# Patient Record
Sex: FEMALE | Race: BLACK OR AFRICAN AMERICAN | NOT HISPANIC OR LATINO | URBAN - METROPOLITAN AREA
[De-identification: names, ages, dates, MRNs, and addresses within clinical notes are randomized per-mention and may not be internally consistent; named-entity substitution may affect disease eponyms.]

---

## 2018-06-12 ENCOUNTER — EMERGENCY (EMERGENCY)
Facility: HOSPITAL | Age: 33
LOS: 0 days | Discharge: HOME | End: 2018-06-12
Attending: EMERGENCY MEDICINE | Admitting: EMERGENCY MEDICINE

## 2018-06-12 VITALS
SYSTOLIC BLOOD PRESSURE: 137 MMHG | OXYGEN SATURATION: 100 % | HEART RATE: 122 BPM | DIASTOLIC BLOOD PRESSURE: 75 MMHG | RESPIRATION RATE: 16 BRPM

## 2018-06-12 VITALS — DIASTOLIC BLOOD PRESSURE: 85 MMHG | HEART RATE: 107 BPM | SYSTOLIC BLOOD PRESSURE: 139 MMHG

## 2018-06-12 DIAGNOSIS — T36.8X5A ADVERSE EFFECT OF OTHER SYSTEMIC ANTIBIOTICS, INITIAL ENCOUNTER: ICD-10-CM

## 2018-06-12 DIAGNOSIS — R21 RASH AND OTHER NONSPECIFIC SKIN ERUPTION: ICD-10-CM

## 2018-06-12 DIAGNOSIS — Z79.899 OTHER LONG TERM (CURRENT) DRUG THERAPY: ICD-10-CM

## 2018-06-12 DIAGNOSIS — Z88.8 ALLERGY STATUS TO OTHER DRUGS, MEDICAMENTS AND BIOLOGICAL SUBSTANCES: ICD-10-CM

## 2018-06-12 DIAGNOSIS — L29.9 PRURITUS, UNSPECIFIED: ICD-10-CM

## 2018-06-12 RX ORDER — DIPHENHYDRAMINE HCL 50 MG
50 CAPSULE ORAL ONCE
Qty: 0 | Refills: 0 | Status: COMPLETED | OUTPATIENT
Start: 2018-06-12 | End: 2018-06-12

## 2018-06-12 RX ORDER — FAMOTIDINE 10 MG/ML
20 INJECTION INTRAVENOUS DAILY
Qty: 0 | Refills: 0 | Status: DISCONTINUED | OUTPATIENT
Start: 2018-06-12 | End: 2018-06-12

## 2018-06-12 RX ORDER — SODIUM CHLORIDE 9 MG/ML
1000 INJECTION INTRAMUSCULAR; INTRAVENOUS; SUBCUTANEOUS ONCE
Qty: 0 | Refills: 0 | Status: COMPLETED | OUTPATIENT
Start: 2018-06-12 | End: 2018-06-12

## 2018-06-12 RX ORDER — FAMOTIDINE 10 MG/ML
1 INJECTION INTRAVENOUS
Qty: 10 | Refills: 0
Start: 2018-06-12 | End: 2018-06-16

## 2018-06-12 RX ORDER — DIPHENHYDRAMINE HCL 50 MG
1 CAPSULE ORAL
Qty: 30 | Refills: 0
Start: 2018-06-12 | End: 2018-06-16

## 2018-06-12 RX ADMIN — Medication 50 MILLIGRAM(S): at 17:39

## 2018-06-12 RX ADMIN — SODIUM CHLORIDE 2000 MILLILITER(S): 9 INJECTION INTRAMUSCULAR; INTRAVENOUS; SUBCUTANEOUS at 17:39

## 2018-06-12 RX ADMIN — FAMOTIDINE 100 MILLIGRAM(S): 10 INJECTION INTRAVENOUS at 17:39

## 2018-06-12 RX ADMIN — Medication 125 MILLIGRAM(S): at 17:39

## 2018-06-12 NOTE — ED PROVIDER NOTE - OBJECTIVE STATEMENT
32 y/o female with multiple allergies presents to ED with complaints of rash/hives s/p administration of Bactrim for sinusitis and Celecoxib for chronic pain.  Has taken both meds x 2 days.  No CP/SOB.  No oropharyngeal complaints.  Speaking in full, unlabored sentences.  Has taken PO Benadryl with minimal relief.

## 2018-06-12 NOTE — ED PROVIDER NOTE - PHYSICAL EXAMINATION
Vital Signs: I have reviewed the initial vital signs.   Constitutional: WDWN in NAD.   Integumentary: (+) diffuse urticarial blanching rash to b/l upper extremities, trunk and face.   ENT: MMM, good turgor.  No tongue swelling.  No drooling.    NECK: Supple, non-tender, no JVD.   Cardiovascular: RRR, No JVD.   Respiratory: CTA b/l, no wheezing  Gastrointestinal: soft, non tender.    Musculoskeletal: FROM, no edema, no calf pain/swelling/erythema.   Neurologic: AAOx3, No focal deficits.

## 2018-06-12 NOTE — ED ADULT TRIAGE NOTE - CHIEF COMPLAINT QUOTE
recent change in antibiotics from augmetin to bactrim hives increased heart rate took benadryl 0900 with no relief

## 2018-06-12 NOTE — ED ADULT NURSE NOTE - OBJECTIVE STATEMENT
patient with hives and itching since 9am this morning. recently started taking new medication a few days ago

## 2018-06-12 NOTE — ED PROVIDER NOTE - NS ED ROS FT
Constitutional: (-) fever  (-)chills  (-)sweats  Eyes/ENT: (-) blurry vision, (-) epistaxis  (-)rhinorrhea   (-) sore throat    Cardiovascular: (-) chest pain, (-) palpitations (-) edema   Respiratory: (-) cough, (-) shortness of breath   Gastrointestinal: (-)nausea  (-)vomiting, (-) diarrhea  (-) abdominal pain   Musculoskeletal: (-) neck pain, (-) back pain, (-) joint pain  Integumentary: (+) rash, (-) edema  Neurological: (-) headache, (-) altered mental status  (-)LOC  Psychiatric: (-) hallucinations  Allergic/Immunologic: (+) pruritus

## 2019-06-01 ENCOUNTER — OUTPATIENT (OUTPATIENT)
Dept: OUTPATIENT SERVICES | Facility: HOSPITAL | Age: 34
LOS: 1 days | End: 2019-06-01

## 2019-06-13 DIAGNOSIS — Z71.89 OTHER SPECIFIED COUNSELING: ICD-10-CM

## 2020-08-17 ENCOUNTER — TRANSCRIPTION ENCOUNTER (OUTPATIENT)
Age: 35
End: 2020-08-17

## 2020-09-03 ENCOUNTER — TRANSCRIPTION ENCOUNTER (OUTPATIENT)
Age: 35
End: 2020-09-03

## 2021-02-26 ENCOUNTER — TRANSCRIPTION ENCOUNTER (OUTPATIENT)
Age: 36
End: 2021-02-26

## 2021-11-03 ENCOUNTER — TRANSCRIPTION ENCOUNTER (OUTPATIENT)
Age: 36
End: 2021-11-03

## 2022-02-02 ENCOUNTER — OUT OF OFFICE VISIT (OUTPATIENT)
Dept: URBAN - METROPOLITAN AREA MEDICAL CENTER 9 | Facility: MEDICAL CENTER | Age: 37
End: 2022-02-02
Payer: COMMERCIAL

## 2022-02-02 ENCOUNTER — TELEPHONE ENCOUNTER (OUTPATIENT)
Dept: URBAN - METROPOLITAN AREA CLINIC 74 | Facility: CLINIC | Age: 37
End: 2022-02-02

## 2022-02-02 DIAGNOSIS — R11.2 ACUTE NAUSEA WITH NONBILIOUS VOMITING: ICD-10-CM

## 2022-02-02 DIAGNOSIS — R10.84 ABDOMINAL CRAMPING, GENERALIZED: ICD-10-CM

## 2022-02-02 DIAGNOSIS — R19.7 ACUTE DIARRHEA: ICD-10-CM

## 2022-02-02 DIAGNOSIS — K52.89 (LYMPHOCYTIC) MICROSCOPIC COLITIS: ICD-10-CM

## 2022-02-02 PROCEDURE — 99232 SBSQ HOSP IP/OBS MODERATE 35: CPT | Performed by: STUDENT IN AN ORGANIZED HEALTH CARE EDUCATION/TRAINING PROGRAM

## 2022-02-02 PROCEDURE — 99222 1ST HOSP IP/OBS MODERATE 55: CPT | Performed by: STUDENT IN AN ORGANIZED HEALTH CARE EDUCATION/TRAINING PROGRAM

## 2022-02-02 PROCEDURE — G8427 DOCREV CUR MEDS BY ELIG CLIN: HCPCS | Performed by: STUDENT IN AN ORGANIZED HEALTH CARE EDUCATION/TRAINING PROGRAM

## 2022-02-08 ENCOUNTER — TRANSCRIPTION ENCOUNTER (OUTPATIENT)
Age: 37
End: 2022-02-08

## 2022-02-09 ENCOUNTER — OFFICE VISIT (OUTPATIENT)
Dept: URBAN - METROPOLITAN AREA CLINIC 74 | Facility: CLINIC | Age: 37
End: 2022-02-09
Payer: COMMERCIAL

## 2022-02-09 ENCOUNTER — WEB ENCOUNTER (OUTPATIENT)
Dept: URBAN - METROPOLITAN AREA CLINIC 74 | Facility: CLINIC | Age: 37
End: 2022-02-09

## 2022-02-09 ENCOUNTER — LAB OUTSIDE AN ENCOUNTER (OUTPATIENT)
Dept: URBAN - METROPOLITAN AREA CLINIC 74 | Facility: CLINIC | Age: 37
End: 2022-02-09

## 2022-02-09 DIAGNOSIS — R10.84 ABDOMINAL PAIN, GENERALIZED: ICD-10-CM

## 2022-02-09 DIAGNOSIS — K52.9 COLITIS: ICD-10-CM

## 2022-02-09 DIAGNOSIS — K21.9 GERD WITHOUT ESOPHAGITIS: ICD-10-CM

## 2022-02-09 PROBLEM — 266435005: Status: ACTIVE | Noted: 2022-02-09

## 2022-02-09 PROCEDURE — 99214 OFFICE O/P EST MOD 30 MIN: CPT | Performed by: INTERNAL MEDICINE

## 2022-02-09 RX ORDER — ETONOGESTREL AND ETHINYL ESTRADIOL .12; .015 MG/D; MG/D
INSERT, EXTENDED RELEASE VAGINAL
Qty: 1 | Refills: 0 | Status: DISCONTINUED | COMMUNITY
Start: 1900-01-01

## 2022-02-09 RX ORDER — CITALOPRAM HYDROBROMIDE 10 MG/1
TABLET, FILM COATED ORAL
Qty: 0 | Refills: 0 | Status: DISCONTINUED | COMMUNITY
Start: 1900-01-01

## 2022-02-09 RX ORDER — OMEPRAZOLE 40 MG/1
TAKE ONE CAPSULE BY MOUTH ONE TIME DAILY BEFORE A MEAL CAPSULE, DELAYED RELEASE PELLETS ORAL
Qty: 30 | Refills: 2 | Status: DISCONTINUED | COMMUNITY
Start: 2019-10-28

## 2022-02-09 RX ORDER — CITALOPRAM 40 MG/1
TABLET ORAL
Qty: 0 | Refills: 0 | Status: ACTIVE | COMMUNITY
Start: 1900-01-01

## 2022-02-09 RX ORDER — BUPROPION HYDROCHLORIDE 150 MG/1
1 TABLET IN THE MORNING TABLET, EXTENDED RELEASE ORAL ONCE A DAY
Status: ACTIVE | COMMUNITY

## 2022-02-09 RX ORDER — PANTOPRAZOLE SODIUM 40 MG/1
1 TABLET TABLET, DELAYED RELEASE ORAL ONCE A DAY
Status: ACTIVE | COMMUNITY

## 2022-02-09 RX ORDER — LISINOPRIL 10 MG/1
TABLET ORAL
Qty: 0 | Refills: 0 | Status: ACTIVE | COMMUNITY
Start: 1900-01-01

## 2022-02-09 NOTE — HPI-TODAY'S VISIT:
Hospital follow up. Pt seen this week by Dr. Ramirez for colitsi, she was discharged. She feels 100% better now, no pain, no diarrhea. CBC/CMP normal. IBD panel pending. Cipro/Flagyl Rx. Assessment: - Diffuse abdominal pain - Nausea and Vomiting - Diarrhea resolved - CT with acute Colitis - Leukcytosis - Hiatal Hernia - Esophagitis - Gastritis - Obesity - Hypotensive     Plan: - Labs and imaging reviewed. - IBD panel pending. - DVT prophylaxis: Heparin SQ. - COVID negative. - Pain management and antiemetic therapy per primary team. - On IV Antibiotic therapy. - Probiotic while on antibiotic therapy. - Pantoprazole 40 mg IV dose every 12 hours. Can switch to po. - Carafate 1 g/10 ml every 6 hours. ------------------ EXAM:  DH CT ABDOMEN/PELVIS WITH IV CONTRAST(CREATININE DRAW IF NEEDED)   CLINICAL INDICATION:  Abdominal pain.   TECHNIQUE:  Following IV administration of 98 mL of Omnipaque, CT scan of the abdomen and pelvis was performed with multiplanar reformatted images generated from the data set. Dose reduction techniques were utilized.   COMPARISON:  CT abdomen and pelvis dated 7/24/2019.   FINDINGS:    Lower chest: Mild curvilinear right middle lobe atelectasis. The heart is normal in size.   Liver: Normal in size and configuration. Couple sub-5 mm right hepatic lobe lesions are too small to accurately characterize but likely represent cysts. No suspicious mass.   Bile ducts: No intrahepatic or extrahepatic bile duct dilation.   Gallbladder: No calcified stones. Normal wall thickness.    Pancreas: Normal. No main pancreatic duct dilation.   Spleen: Normal.   Adrenals: Normal.   Kidneys: Subcentimeter right renal lesion likely representing a cyst. No suspicious mass. No hydroureteronephrosis.   Bladder: Unremarkable.   Reproductive organs: The uterus and ovaries appear normal.   Bowel: Circumferential wall thickening with associated mild pericolonic inflammatory fat stranding involving the ascending colon extending to the hepatic flexure, and to a lesser extend the proximal descending colon, compatible with acute colitis. No  findings of bowel obstruction. The appendix is normal in appearance.   Peritoneum/retroperitoneum: No fluid collection or ascites.   Lymph nodes: Increased number of prominent right lower quadrant mesenteric lymph nodes measuring up to 1.1 cm in short axis, likely reactive.    Vasculature: No aneurysmal dilation of the major abdominopelvic arteries. The mesenteric arteries are patent.   Bones: No destructive osseous lesions.   IMPRESSION: .         .   Findings compatible with acute colitis involving the right colon, and to a lesser extent the left colon.   Released By: EMELY FAIRBANKS MD 2/1/2022 2:23 AM

## 2022-03-15 ENCOUNTER — OFFICE VISIT (OUTPATIENT)
Dept: URBAN - METROPOLITAN AREA SURGERY CENTER 30 | Facility: SURGERY CENTER | Age: 37
End: 2022-03-15
Payer: COMMERCIAL

## 2022-03-15 DIAGNOSIS — R93.3 ABN FINDINGS-GI TRACT: ICD-10-CM

## 2022-03-15 PROCEDURE — G8907 PT DOC NO EVENTS ON DISCHARG: HCPCS | Performed by: INTERNAL MEDICINE

## 2022-03-15 PROCEDURE — 45378 DIAGNOSTIC COLONOSCOPY: CPT | Performed by: INTERNAL MEDICINE

## 2022-03-24 ENCOUNTER — DASHBOARD ENCOUNTERS (OUTPATIENT)
Age: 37
End: 2022-03-24

## 2022-03-28 ENCOUNTER — OFFICE VISIT (OUTPATIENT)
Dept: URBAN - METROPOLITAN AREA CLINIC 40 | Facility: CLINIC | Age: 37
End: 2022-03-28

## 2022-03-28 RX ORDER — CITALOPRAM 40 MG/1
TABLET ORAL
Qty: 0 | Refills: 0 | Status: ACTIVE | COMMUNITY
Start: 1900-01-01

## 2022-03-28 RX ORDER — LISINOPRIL 10 MG/1
TABLET ORAL
Qty: 0 | Refills: 0 | Status: ACTIVE | COMMUNITY
Start: 1900-01-01

## 2022-03-28 RX ORDER — BUPROPION HYDROCHLORIDE 150 MG/1
1 TABLET IN THE MORNING TABLET, EXTENDED RELEASE ORAL ONCE A DAY
Status: ACTIVE | COMMUNITY

## 2022-03-28 RX ORDER — PANTOPRAZOLE SODIUM 40 MG/1
1 TABLET TABLET, DELAYED RELEASE ORAL ONCE A DAY
Status: ACTIVE | COMMUNITY

## 2022-06-06 ENCOUNTER — TELEPHONE ENCOUNTER (OUTPATIENT)
Dept: URBAN - METROPOLITAN AREA CLINIC 40 | Facility: CLINIC | Age: 37
End: 2022-06-06

## 2022-06-28 ENCOUNTER — APPOINTMENT (OUTPATIENT)
Dept: ORTHOPEDIC SURGERY | Facility: CLINIC | Age: 37
End: 2022-06-28
Payer: OTHER MISCELLANEOUS

## 2022-06-28 VITALS — HEIGHT: 65 IN | WEIGHT: 160 LBS | BODY MASS INDEX: 26.66 KG/M2

## 2022-06-28 PROBLEM — Z00.00 ENCOUNTER FOR PREVENTIVE HEALTH EXAMINATION: Status: ACTIVE | Noted: 2022-06-28

## 2022-06-28 PROCEDURE — 99072 ADDL SUPL MATRL&STAF TM PHE: CPT

## 2022-06-28 PROCEDURE — 99213 OFFICE O/P EST LOW 20 MIN: CPT

## 2022-06-28 NOTE — HISTORY OF PRESENT ILLNESS
[de-identified] : Patient is here for follow-up evaluation for right shoulder.  She is status post right shoulder arthroscopy but is still having significant neck pain which has not been approved by worker's compensation yet.

## 2022-06-28 NOTE — IMAGING
[de-identified] :   On evaluation of right upper extremity she still has limited range of motion to passive and active, pain with terminal range of motion, Norvasc intact, compartments soft nontender, strength preserved.  She is still having significant tender palpation over cervical spine and paraspinal muscles with limited range of motion.

## 2022-06-28 NOTE — HISTORY OF PRESENT ILLNESS
[de-identified] : Patient is here for follow-up evaluation for right shoulder.  She is status post right shoulder arthroscopy but is still having significant neck pain which has not been approved by worker's compensation yet.

## 2022-06-28 NOTE — ASSESSMENT
[FreeTextEntry1] :   I went over findings with the patient.  A new prescription for physical therapy was written.  This needs to be approved by worker's compensation.  She still needs approval and workup for her neck pain is some worker's compensation related.  She will follow-up and see me in 2-3 months.

## 2022-06-28 NOTE — IMAGING
[de-identified] :   On evaluation of right upper extremity she still has limited range of motion to passive and active, pain with terminal range of motion, Norvasc intact, compartments soft nontender, strength preserved.  She is still having significant tender palpation over cervical spine and paraspinal muscles with limited range of motion.

## 2022-07-11 NOTE — DISCUSSION/SUMMARY
[de-identified] : I went over findings with the patient. She will continue physical therapy new prescription was provided.\par Authorization has not been improved with this knees he arthritis this is medically necessary. She is regain range of\par motion as she is having capsulitis the shoulder and the now physical therapy will be of detriment to her overall\par recovery. She will continue been evaluated for neck is still is a work related injury. She is currently not working\par with a temporary total degree of disability

## 2022-07-11 NOTE — DISCUSSION/SUMMARY
[de-identified] : I went over findings with the patient. She will continue physical therapy new prescription was provided.\par Authorization has not been improved with this knees he arthritis this is medically necessary. She is regain range of\par motion as she is having capsulitis the shoulder and the now physical therapy will be of detriment to her overall\par recovery. She will continue been evaluated for neck is still is a work related injury. She is currently not working\par with a temporary total degree of disability

## 2022-07-18 ENCOUNTER — APPOINTMENT (OUTPATIENT)
Dept: NEUROSURGERY | Facility: CLINIC | Age: 37
End: 2022-07-18

## 2022-07-18 VITALS — BODY MASS INDEX: 31.5 KG/M2 | HEIGHT: 66 IN | WEIGHT: 196 LBS

## 2022-07-18 DIAGNOSIS — Z82.49 FAMILY HISTORY OF ISCHEMIC HEART DISEASE AND OTHER DISEASES OF THE CIRCULATORY SYSTEM: ICD-10-CM

## 2022-07-18 DIAGNOSIS — Z80.9 FAMILY HISTORY OF MALIGNANT NEOPLASM, UNSPECIFIED: ICD-10-CM

## 2022-07-18 DIAGNOSIS — Z86.59 PERSONAL HISTORY OF OTHER MENTAL AND BEHAVIORAL DISORDERS: ICD-10-CM

## 2022-07-18 DIAGNOSIS — Z83.3 FAMILY HISTORY OF DIABETES MELLITUS: ICD-10-CM

## 2022-07-18 DIAGNOSIS — Z86.69 PERSONAL HISTORY OF OTHER DISEASES OF THE NERVOUS SYSTEM AND SENSE ORGANS: ICD-10-CM

## 2022-07-18 PROCEDURE — 99204 OFFICE O/P NEW MOD 45 MIN: CPT

## 2022-07-18 PROCEDURE — 99072 ADDL SUPL MATRL&STAF TM PHE: CPT

## 2022-07-18 RX ORDER — TIZANIDINE 4 MG/1
4 TABLET ORAL
Refills: 0 | Status: ACTIVE | COMMUNITY

## 2022-07-18 RX ORDER — MONTELUKAST SODIUM 10 MG/1
10 TABLET, FILM COATED ORAL
Refills: 0 | Status: ACTIVE | COMMUNITY

## 2022-07-18 RX ORDER — FLUTICASONE PROPIONATE 50 UG/1
50 SPRAY, METERED NASAL
Refills: 0 | Status: ACTIVE | COMMUNITY

## 2022-07-18 RX ORDER — CITALOPRAM HYDROBROMIDE 40 MG/1
40 TABLET, FILM COATED ORAL
Refills: 0 | Status: ACTIVE | COMMUNITY

## 2022-07-25 NOTE — HISTORY OF PRESENT ILLNESS
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 However, thew day of the injury was moved to a department where she had to pack boxes. While lifting something from the tote, felt a sharp pain in the RIGHT shoulder, which radiated to the left shoulder. Over the next couple of weeks, began to experience shooting pain down into the RIGHT bicep. Eventually had pain down into the elbow and hand. While undergoing hot and cold compresses with WC physician, began to experience symptoms into the left side. \par \dg Denies any bowel or blader incontinence at this time. \par luis Has not undergone any PT for her cervical spine at this time.  DId undergo 2 BRI; last injection 5/22/2022 which did not alleviate any of her pain whatsoever. The first injection provided relief for a few hours. \par \dg Has not tried any Acupuncture or Chiropractic treatments.\par \par Rx: Currently taking Gabapentin 800 mg TID and Tizanidine 4mg BID\par \par IMAGING:  [facility] cervical wo dated with multilevel spondylosis and disc herniation at C4-5 and C5-6. There is associated bilateral foraminal stenosis, worse at C5-6 with LEFT greater than right narrowing. 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 Active denervation noted. There are findings to suggest the presence of bilateral subacute to chronic C5-6 and right C8 radiculopathies. \par \par PE:\par Constitutional: Well appearing, no distress sitting on the chair in mild discomfort\par HEENT: Normocephalic Atraumatic, sclerae anicteric, mucus membranes moist, trachea midline\par Psychiatric: Alert and oriented to all spheres, normal mood\par Pulmonary: No respiratory distress or accessory muscle use\par \par Neurologic:\par CN II-XII grossly intact \par Palpation: Exquisite tenderness to palpation of SCM, trapezius and rhomboids\par Strength: Decreased strength RUE when compared to the left secondary to severe pain 3+ to 4-/5 Delt, bicep, tri and IO) \par Sensation: Decreased sensation to light touch RUE when compared to the LEFT\par \par Reflexes:\par 2+ patellar\par 2+ biceps\par 2+ ankle jerk\par No Valentine's\par No clonus\par No babinski\par \par ROM decreased thorough all planes secondary to severe spasm of SCM, trapezius and rhomboid. \par \par Signs:\par SLR negative; Ta's maneuver negative\par \par Gait: WNL\par 
right flank pain

## 2022-09-14 ENCOUNTER — APPOINTMENT (OUTPATIENT)
Dept: NEUROSURGERY | Facility: CLINIC | Age: 37
End: 2022-09-14

## 2022-09-14 VITALS — HEIGHT: 66 IN | BODY MASS INDEX: 31.5 KG/M2 | WEIGHT: 196 LBS

## 2022-09-14 PROCEDURE — 99214 OFFICE O/P EST MOD 30 MIN: CPT

## 2022-09-14 PROCEDURE — 99072 ADDL SUPL MATRL&STAF TM PHE: CPT

## 2022-09-20 NOTE — HISTORY OF PRESENT ILLNESS
[FreeTextEntry1] : At this time Ms. Anderson cont to have severe sig  bilateral radicular pain. She is in agony. She has tried well over a year of conservative management with no improvement. her QOL is  severely affected.  \par \par She has failed PT/chiro, BRI, MBB/RFA, medications.\par \par She is here to discuss surgery

## 2022-09-20 NOTE — PHYSICAL EXAM
[FreeTextEntry1] : Constitutional: Patient crying ins evere pain \par HEENT: Normocephalic Atraumatic\par Psychiatric: Alert and oriented to all spheres, normal mood\par Pulmonary: no respiratory distress\par Abdomen: non-distended\par Vascular/Extremities: no edema, no cyanosis, no clubbing\par \par \par Neurologic: \par CN II-XII grossly intact\par ROM: severe restriciton in C spine\par Palpation: severe pain to palpation cervical paraspinous muscles\par Strength: Full strength in all major muscle groups, no atrophy, except BUE 4/5 pain limited\par Sensation: hypersensitivity of bilateral arms to LT\par Reflexes: \par               2+ patellar\par               2+ biceps\par               2+ ankle jerk\par              No Valentine's\par              No clonus\par              No babinski\par \par Signs:\par SLR negative\par L'hermitte's negative\par \par Gait:fluid\par \par \par \par \par

## 2022-09-20 NOTE — REASON FOR VISIT
[Follow-Up: _____] : a [unfilled] follow-up visit [FreeTextEntry1] : patient reports she is very severe pain. The pain is starting from mid neck and travels all the way down to the fingertips. She explained she has tingling and burning sensations starting from under the neck down to her arms.

## 2022-09-29 ENCOUNTER — APPOINTMENT (OUTPATIENT)
Dept: ORTHOPEDIC SURGERY | Facility: CLINIC | Age: 37
End: 2022-09-29

## 2022-09-29 PROCEDURE — 99072 ADDL SUPL MATRL&STAF TM PHE: CPT

## 2022-09-29 PROCEDURE — 99213 OFFICE O/P EST LOW 20 MIN: CPT

## 2022-09-29 NOTE — HISTORY OF PRESENT ILLNESS
[de-identified] : Patient is here for follow-up evaluation for right shoulder.  She is status post right shoulder arthroscopy but is still having significant neck pain which has not been approved by worker's compensation yet.

## 2022-09-29 NOTE — ASSESSMENT
[FreeTextEntry1] : wenmt over findings\par will cont seeing NSx for treatment\par cont pt and hep for shoulder\par pain control\par not working with temp total disability

## 2022-09-29 NOTE — IMAGING
[de-identified] :   On evaluation of right upper extremity she still has limited range of motion to passive and active, pain with terminal range of motion, Norvasc intact, compartments soft nontender, strength preserved.  She is still having significant tender palpation over cervical spine and paraspinal muscles with limited range of motion.

## 2022-10-25 ENCOUNTER — NON-APPOINTMENT (OUTPATIENT)
Age: 37
End: 2022-10-25

## 2022-10-26 ENCOUNTER — EMERGENCY (EMERGENCY)
Facility: HOSPITAL | Age: 37
LOS: 0 days | Discharge: HOME | End: 2022-10-26
Attending: EMERGENCY MEDICINE | Admitting: EMERGENCY MEDICINE

## 2022-10-26 VITALS
HEART RATE: 101 BPM | RESPIRATION RATE: 17 BRPM | WEIGHT: 197.98 LBS | OXYGEN SATURATION: 100 % | TEMPERATURE: 98 F | SYSTOLIC BLOOD PRESSURE: 131 MMHG | DIASTOLIC BLOOD PRESSURE: 83 MMHG

## 2022-10-26 DIAGNOSIS — Z91.041 RADIOGRAPHIC DYE ALLERGY STATUS: ICD-10-CM

## 2022-10-26 DIAGNOSIS — Z88.2 ALLERGY STATUS TO SULFONAMIDES: ICD-10-CM

## 2022-10-26 DIAGNOSIS — K12.2 CELLULITIS AND ABSCESS OF MOUTH: ICD-10-CM

## 2022-10-26 DIAGNOSIS — R22.2 LOCALIZED SWELLING, MASS AND LUMP, TRUNK: ICD-10-CM

## 2022-10-26 PROCEDURE — 99284 EMERGENCY DEPT VISIT MOD MDM: CPT

## 2022-10-26 RX ORDER — DIPHENHYDRAMINE HCL 50 MG
50 CAPSULE ORAL ONCE
Refills: 0 | Status: COMPLETED | OUTPATIENT
Start: 2022-10-26 | End: 2022-10-26

## 2022-10-26 RX ORDER — FAMOTIDINE 10 MG/ML
20 INJECTION INTRAVENOUS ONCE
Refills: 0 | Status: COMPLETED | OUTPATIENT
Start: 2022-10-26 | End: 2022-10-26

## 2022-10-26 RX ORDER — DEXAMETHASONE 0.5 MG/5ML
10 ELIXIR ORAL ONCE
Refills: 0 | Status: COMPLETED | OUTPATIENT
Start: 2022-10-26 | End: 2022-10-26

## 2022-10-26 RX ORDER — EPINEPHRINE 0.3 MG/.3ML
0.1 INJECTION INTRAMUSCULAR; SUBCUTANEOUS
Qty: 1 | Refills: 0
Start: 2022-10-26

## 2022-10-26 RX ADMIN — Medication 50 MILLIGRAM(S): at 12:08

## 2022-10-26 RX ADMIN — FAMOTIDINE 20 MILLIGRAM(S): 10 INJECTION INTRAVENOUS at 12:08

## 2022-10-26 RX ADMIN — Medication 10 MILLIGRAM(S): at 12:08

## 2022-10-26 NOTE — ED PROVIDER NOTE - OBJECTIVE STATEMENT
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 Pt states she ate a store bought lemon cake prior to going to sleep last night and awoke with the sensation of needing "to cough something out and clear her throat" but couldn't. She states she also started to feel achey yesterday and son is sick with a virus currently. She was seen at urgent care prior to arrival with a negative covid/strep test and was referred to ED given her prior h/o anaphylaxis. She states prior allergic reaction had initially started with hives and then progressed to angioedema. She denies hives, SOB, lip or mouth swelling currently. Has not taken any meds PTA. Denies trouble swallowing or handling secretions

## 2022-10-26 NOTE — ED PROVIDER NOTE - ATTENDING APP SHARED VISIT CONTRIBUTION OF CARE
38 yo f with pmh of allergies, sent from [facility] for swelling of uvula.  pt says feels something in back of her throat when she swallows.  no difficulty swallowing or breathing.  ate new lemon cake last night.  son has uri recently.  no hives or wheezing.  no itching.  no fever or chills.  exam: nad, ncat, perrl, eomi, swollen uvula, OP patent, no stridor, mmm, rrr, ctab, abd soft, nt, nd aox3, no rash imp: pt with uvulitis, allergic v. viral, will give allergy treatment, f/u with ent

## 2022-10-26 NOTE — ED ADULT NURSE NOTE - CHIEF COMPLAINT QUOTE
pt c/o throat itchiness after eating lemon cake last night. pt went to [facility] earlier. airway patent, pt able to speak in full sentences. pt states she drank tea and it helped soothe her throat. pt reports redness and swelling around lips this morning, pt has mild uvular swelling now.

## 2022-10-26 NOTE — ED PROVIDER NOTE - PATIENT PORTAL LINK FT
You can access the FollowMyHealth Patient Portal offered by [facility] by registering at the following website: [email or web address]. By joining [facility]’s FollowMyHealth portal, you will also be able to view your health information using other applications (apps) compatible with our system.

## 2022-10-26 NOTE — ED ADULT NURSE NOTE - ISOLATION TYPE:
tonight and the [facility] nurses in the am.     Estela Cardenas RN, [place] [facility]    Reason for Disposition    Last bowel movement (BM) > 4 days ago    Additional Information    Negative: [1] Abdominal pain is main symptom AND [2] adult male    Negative: [1] Abdominal pain is main symptom AND [2] adult female    Negative: Rectal bleeding or blood in stool is main symptom    Negative: Rectal pain or itching is main symptom    Negative: Patient sounds very sick or weak to the triager    Negative: [1] Vomiting AND [2] abdomen looks much more swollen than usual    Negative: [1] Vomiting AND [2] contains bile (green color)    Negative: [1] Constant abdominal pain AND [2] present > 2 hours    Negative: [1] Sudden onset rectal pain (straining, rectal pressure or fullness) AND [2] NOT better after SITZ bath, suppository or enema    Negative: Abdomen is more swollen than usual    Negative: [1] Intermittent mild abdominal pain AND [2] fever    Protocols used: CONSTIPATION-ADULT-AH    
None

## 2022-10-26 NOTE — ED PROVIDER NOTE - NSFOLLOWUPCLINICS_GEN_ALL_ED_FT
[facility]  ENT  [address], [address]nd [place] [address]  Phone: [phone]  Fax:

## 2022-10-26 NOTE — ED PROVIDER NOTE - NS ED ROS FT
CONST: No fever, chills or bodyaches  EYES: No pain, redness, drainage or visual changes.  ENT: No ear pain or discharge, nasal discharge or congestion. No sore throat  CARD: No chest pain, palpitations  RESP: No SOB, cough  GI: No abdominal pain, N/V/D  SKIN: No rashes  NEURO: No headache, dizziness, paresthesias or LOC

## 2022-10-26 NOTE — ED PROVIDER NOTE - NS ED ATTENDING STATEMENT MOD
This was a shared visit with the ROSALINO. I reviewed and verified the documentation and independently performed the documented:

## 2022-10-26 NOTE — ED PROVIDER NOTE - NSFOLLOWUPINSTRUCTIONS_ED_ALL_ED_FT
UVULITIS - AfterCare(R) Instructions(ER/ED)           Uvulitis    WHAT YOU NEED TO KNOW:    Uvulitis is severe swelling of your uvula. The uvula is the small piece of tissue that hangs in the back of your throat. Uvulitis is usually caused by an infection, an injury to the back of the throat, or an allergic reaction.   Mouth Anatomy         DISCHARGE INSTRUCTIONS:    Medicines:   •Antibiotics: You may need antibiotics if an infection caused your uvulitis. This medicine will help fight infection. Take your antibiotics until they are gone, even if you feel better.      •Steroids:  You may need steroid medicine if an allergic reaction caused your uvulitis. This medicine helps decrease redness, pain, and swelling.      •Antihistamines: You may need antihistamines if an allergic reaction caused your uvulitis. This medicine helps decrease itching.       •Take your medicine as directed. Contact your healthcare provider if you think your medicine is not helping or if you have side effects. Tell your provider if you are allergic to any medicine. Keep a list of the medicines, vitamins, and herbs you take. Include the amounts, and when and why you take them. Bring the list or the pill bottles to follow-up visits. Carry your medicine list with you in case of an emergency.      Follow up with your healthcare provider as directed: Write down your questions so you remember to ask them during your visits.    Contact your healthcare provider if:   •Your signs and symptoms do not get better, even after treatment.      •You have questions or concerns about your condition or treatment.      Return to the emergency department if:   •You have worse trouble swallowing.       •You have trouble breathing.

## 2022-10-26 NOTE — ED ADULT TRIAGE NOTE - CHIEF COMPLAINT QUOTE
pt c/o throat itchiness after eating lemon cake last night. pt went [place] [facility] [place]. airway patent, pt able to speak in full sentences. pt states she drank tea and it helped soothe her throat. pt reports redness and swelling around lips this morning, pt has mild uvular swelling now.

## 2022-10-26 NOTE — ED PROVIDER NOTE - PHYSICAL EXAMINATION
CONST: Well appearing in NAD  EYES: PERRL, EOMI, Sclera and conjunctiva clear.   ENT: No nasal discharge. TM's clear B/L without drainage. Oropharynx normal appearing, no erythema or exudates. No angioedema. (+) uvulitis  CARD: Normal S1 S2; Normal rate and rhythm  RESP: Equal BS B/L, No wheezes, rhonchi or rales. No distress  SKIN: Warm, dry, no acute rashes. Good turgor  NEURO: A&Ox3, No focal deficits. Strength 5/5 with no sensory deficits. Steady gait

## 2022-11-17 ENCOUNTER — APPOINTMENT (OUTPATIENT)
Dept: NEUROSURGERY | Facility: CLINIC | Age: 37
End: 2022-11-17

## 2022-11-17 PROCEDURE — 99442: CPT

## 2022-11-21 NOTE — HISTORY OF PRESENT ILLNESS
[FreeTextEntry1] : I am following up with Nubia her new MRI is significant for severe C5-6 foraminal stenosis. SHe cont to have severe pain in the RUE predominantly. We discussed given the paucity of other findings on the MRI proceeding with right C5-6 laminoforaminotomy at this time as she has failed all other manner of conservative management including but not limited ot PT>6 weeks, home exercises, pain management Myra, RFA. She understands given her other pathology including the shoulder and the suprascapular nerve irritation that it is unlikely that all her pain will be relieved with this procedure but it is reasonable given the severity of foraminal stenosis and her concordant pain that she will have some significant relief.

## 2022-11-21 NOTE — ASSESSMENT
[FreeTextEntry1] : WE discussed the risk, benefits and alternatives of right C5-6 laminoforaminotomy including but not limited to bleeding, infeciton, CSF leak, nerve damage, spinal cord injury, lack of pain relief, need for further procedure including possible future ACDF. She understands and wishes to proceed.

## 2023-01-31 ENCOUNTER — APPOINTMENT (OUTPATIENT)
Dept: ORTHOPEDIC SURGERY | Facility: CLINIC | Age: 38
End: 2023-01-31

## 2023-01-31 ENCOUNTER — OUTPATIENT (OUTPATIENT)
Dept: OUTPATIENT SERVICES | Facility: HOSPITAL | Age: 38
LOS: 1 days | Discharge: HOME | End: 2023-01-31
Payer: OTHER MISCELLANEOUS

## 2023-01-31 VITALS
SYSTOLIC BLOOD PRESSURE: 133 MMHG | HEIGHT: 66 IN | OXYGEN SATURATION: 100 % | TEMPERATURE: 98 F | HEART RATE: 99 BPM | WEIGHT: 196.43 LBS | DIASTOLIC BLOOD PRESSURE: 94 MMHG | RESPIRATION RATE: 16 BRPM

## 2023-01-31 DIAGNOSIS — Z98.890 OTHER SPECIFIED POSTPROCEDURAL STATES: Chronic | ICD-10-CM

## 2023-01-31 DIAGNOSIS — M47.22 OTHER SPONDYLOSIS WITH RADICULOPATHY, CERVICAL REGION: ICD-10-CM

## 2023-01-31 DIAGNOSIS — Z90.49 ACQUIRED ABSENCE OF OTHER SPECIFIED PARTS OF DIGESTIVE TRACT: Chronic | ICD-10-CM

## 2023-01-31 DIAGNOSIS — Z01.818 ENCOUNTER FOR OTHER PREPROCEDURAL EXAMINATION: ICD-10-CM

## 2023-01-31 LAB
A1C WITH ESTIMATED AVERAGE GLUCOSE RESULT: 5.8 % — HIGH (ref 4–5.6)
ALBUMIN SERPL ELPH-MCNC: 5.1 G/DL — SIGNIFICANT CHANGE UP (ref 3.5–5.2)
ALP SERPL-CCNC: 177 U/L — HIGH (ref 30–115)
ALT FLD-CCNC: 11 U/L — SIGNIFICANT CHANGE UP (ref 0–41)
ANION GAP SERPL CALC-SCNC: 14 MMOL/L — SIGNIFICANT CHANGE UP (ref 7–14)
APTT BLD: 35 SEC — SIGNIFICANT CHANGE UP (ref 27–39.2)
AST SERPL-CCNC: 12 U/L — SIGNIFICANT CHANGE UP (ref 0–41)
BASOPHILS # BLD AUTO: 0.05 K/UL — SIGNIFICANT CHANGE UP (ref 0–0.2)
BASOPHILS NFR BLD AUTO: 0.4 % — SIGNIFICANT CHANGE UP (ref 0–1)
BILIRUB SERPL-MCNC: <0.2 MG/DL — SIGNIFICANT CHANGE UP (ref 0.2–1.2)
BLD GP AB SCN SERPL QL: SIGNIFICANT CHANGE UP
BUN SERPL-MCNC: 12 MG/DL — SIGNIFICANT CHANGE UP (ref 10–20)
CALCIUM SERPL-MCNC: 10.8 MG/DL — HIGH (ref 8.4–10.5)
CHLORIDE SERPL-SCNC: 100 MMOL/L — SIGNIFICANT CHANGE UP (ref 98–110)
CO2 SERPL-SCNC: 27 MMOL/L — SIGNIFICANT CHANGE UP (ref 17–32)
CREAT SERPL-MCNC: 0.7 MG/DL — SIGNIFICANT CHANGE UP (ref 0.7–1.5)
EGFR: 113 ML/MIN/1.73M2 — SIGNIFICANT CHANGE UP
EOSINOPHIL # BLD AUTO: 0.05 K/UL — SIGNIFICANT CHANGE UP (ref 0–0.7)
EOSINOPHIL NFR BLD AUTO: 0.4 % — SIGNIFICANT CHANGE UP (ref 0–8)
ESTIMATED AVERAGE GLUCOSE: 120 MG/DL — HIGH (ref 68–114)
GLUCOSE SERPL-MCNC: 87 MG/DL — SIGNIFICANT CHANGE UP (ref 70–99)
HCT VFR BLD CALC: 43.8 % — SIGNIFICANT CHANGE UP (ref 37–47)
HGB BLD-MCNC: 14.6 G/DL — SIGNIFICANT CHANGE UP (ref 12–16)
IMM GRANULOCYTES NFR BLD AUTO: 0.4 % — HIGH (ref 0.1–0.3)
INR BLD: 0.97 RATIO — SIGNIFICANT CHANGE UP (ref 0.65–1.3)
LYMPHOCYTES # BLD AUTO: 2.76 K/UL — SIGNIFICANT CHANGE UP (ref 1.2–3.4)
LYMPHOCYTES # BLD AUTO: 20.3 % — LOW (ref 20.5–51.1)
MCHC RBC-ENTMCNC: 28.5 PG — SIGNIFICANT CHANGE UP (ref 27–31)
MCHC RBC-ENTMCNC: 33.3 G/DL — SIGNIFICANT CHANGE UP (ref 32–37)
MCV RBC AUTO: 85.5 FL — SIGNIFICANT CHANGE UP (ref 81–99)
MONOCYTES # BLD AUTO: 0.92 K/UL — HIGH (ref 0.1–0.6)
MONOCYTES NFR BLD AUTO: 6.8 % — SIGNIFICANT CHANGE UP (ref 1.7–9.3)
MRSA PCR RESULT.: NEGATIVE — SIGNIFICANT CHANGE UP
NEUTROPHILS # BLD AUTO: 9.77 K/UL — HIGH (ref 1.4–6.5)
NEUTROPHILS NFR BLD AUTO: 71.7 % — SIGNIFICANT CHANGE UP (ref 42.2–75.2)
NRBC # BLD: 0 /100 WBCS — SIGNIFICANT CHANGE UP (ref 0–0)
PLATELET # BLD AUTO: 299 K/UL — SIGNIFICANT CHANGE UP (ref 130–400)
POTASSIUM SERPL-MCNC: 4.1 MMOL/L — SIGNIFICANT CHANGE UP (ref 3.5–5)
POTASSIUM SERPL-SCNC: 4.1 MMOL/L — SIGNIFICANT CHANGE UP (ref 3.5–5)
PROT SERPL-MCNC: 7.3 G/DL — SIGNIFICANT CHANGE UP (ref 6–8)
PROTHROM AB SERPL-ACNC: 11 SEC — SIGNIFICANT CHANGE UP (ref 9.95–12.87)
RBC # BLD: 5.12 M/UL — SIGNIFICANT CHANGE UP (ref 4.2–5.4)
RBC # FLD: 13.2 % — SIGNIFICANT CHANGE UP (ref 11.5–14.5)
SODIUM SERPL-SCNC: 141 MMOL/L — SIGNIFICANT CHANGE UP (ref 135–146)
WBC # BLD: 13.6 K/UL — HIGH (ref 4.8–10.8)
WBC # FLD AUTO: 13.6 K/UL — HIGH (ref 4.8–10.8)

## 2023-01-31 PROCEDURE — 93010 ELECTROCARDIOGRAM REPORT: CPT

## 2023-01-31 NOTE — H&P PST ADULT - NSICDXPASTMEDICALHX_GEN_ALL_CORE_FT
PAST MEDICAL HISTORY:  Anxiety and depression     Asthma, intermittent     Cervical spondylosis with radiculopathy     DM (diabetes mellitus)     H/O migraine     History of posttraumatic stress disorder (PTSD)

## 2023-01-31 NOTE — H&P PST ADULT - REASON FOR ADMISSION
38 y/o female presents to PAST in preparation for right cervical 5-6 laminoforaminotomy in [facility] under GA with Dr. Hester on 2/15/23

## 2023-01-31 NOTE — H&P PST ADULT - HISTORY OF PRESENT ILLNESS
38 y/o female presents to PAST in preparation for right cervical 5-6 laminoforaminotomy in [facility] under GA with Dr. Hester on 2/15/23    Pt states that she has had pain for the past 3 years that is 8/10. Pt has tried conservative measures of nerve blocks, physical therapy, epidurals without relief. Pt with a dx of C5-6 foraminal stenosis. Pain radiates from the neck to the shoulder blade to the hands. Pain is dull, aching, throbbing at times. Pt now for above procedure.     PATIENT CURRENTLY DENIES CHEST PAIN  SHORTNESS OF BREATH  PALPITATIONS,  DYSURIA, OR UPPER RESPIRATORY INFECTION IN PAST 2 WEEKS  EXERCISE  TOLERANCE: pt unable to walk distances due to pain, unable evaluate exercise tolerance  pt denies any covid s/s, or tested positive in the past  pt advised self quarantine till day of procedure  Patient verbalized understanding of instructions and was given the opportunity to ask questions and have them answered.  written and verbal instructions with teach back on chlorhexidine shampoo provided,  pt verbalized understanding with returned demonstration    Opioid Risk Assessment Tool (Female)       Family history of substance abuse            Alcohol (1)            Illegal Drugs (2)            Prescription drugs (4)       Personal history of substance abuse            Alcohol (3)            Illegal Drugs (4)            Prescription drugs (5)       Age between 16-45 (1)       History of preadolescent sexual abuse (3)       Psychological disease (ADD, ADHD, OCD, Bipolar Disorder, Schizophrenia, Depression) (2)    Scoring Totals:   Moderate Risk (4-7)      Anesthesia Alert  NO--Difficult Airway  NO--History of neck surgery or radiation  Yes--Limited ROM of neck dec neck extension and flexion   NO--History of Malignant hyperthermia  NO--Personal or family history of Pseudocholinesterase deficiency.  NO--Prior Anesthesia Complication  NO--Latex Allergy  NO--Loose teeth  NO--History of Rheumatoid Arthritis  NO--PAZ  NO--Bleeding risk  NO--Other_____  Mallampati airway: Class IV    M47.22,25847    Other spondylosis with radiculopathy, cervical region    Encounter for other preprocedural examination    ^M47.22,38213    Other spondylosis with radiculopathy, cervical region    Encounter for other preprocedural examination

## 2023-01-31 NOTE — H&P PST ADULT - NSICDXPASTSURGICALHX_GEN_ALL_CORE_FT
PAST SURGICAL HISTORY:  H/O knee surgery     H/O rotator cuff surgery     History of cholecystectomy

## 2023-01-31 NOTE — H&P PST ADULT - NSANTHOSAYNRD_GEN_A_CORE
No. PAZ screening performed.  STOP BANG Legend: 0-2 = LOW Risk; 3-4 = INTERMEDIATE Risk; 5-8 = HIGH Risk

## 2023-02-01 PROBLEM — Z86.69 PERSONAL HISTORY OF OTHER DISEASES OF THE NERVOUS SYSTEM AND SENSE ORGANS: Chronic | Status: ACTIVE | Noted: 2023-01-31

## 2023-02-01 PROBLEM — F41.9 ANXIETY DISORDER, UNSPECIFIED: Chronic | Status: ACTIVE | Noted: 2023-01-31

## 2023-02-01 PROBLEM — J45.20 MILD INTERMITTENT ASTHMA, UNCOMPLICATED: Chronic | Status: ACTIVE | Noted: 2023-01-31

## 2023-02-01 PROBLEM — Z86.59 PERSONAL HISTORY OF OTHER MENTAL AND BEHAVIORAL DISORDERS: Chronic | Status: ACTIVE | Noted: 2023-01-31

## 2023-02-01 PROBLEM — M47.22 OTHER SPONDYLOSIS WITH RADICULOPATHY, CERVICAL REGION: Chronic | Status: ACTIVE | Noted: 2023-01-31

## 2023-02-03 ENCOUNTER — OUTPATIENT (OUTPATIENT)
Dept: OUTPATIENT SERVICES | Facility: HOSPITAL | Age: 38
LOS: 1 days | Discharge: HOME | End: 2023-02-03

## 2023-02-03 DIAGNOSIS — Z98.890 OTHER SPECIFIED POSTPROCEDURAL STATES: Chronic | ICD-10-CM

## 2023-02-03 DIAGNOSIS — Z02.9 ENCOUNTER FOR ADMINISTRATIVE EXAMINATIONS, UNSPECIFIED: ICD-10-CM

## 2023-02-03 DIAGNOSIS — Z90.49 ACQUIRED ABSENCE OF OTHER SPECIFIED PARTS OF DIGESTIVE TRACT: Chronic | ICD-10-CM

## 2023-02-03 LAB
BASOPHILS # BLD AUTO: 0.04 K/UL — SIGNIFICANT CHANGE UP (ref 0–0.2)
BASOPHILS NFR BLD AUTO: 0.3 % — SIGNIFICANT CHANGE UP (ref 0–1)
EOSINOPHIL # BLD AUTO: 0.07 K/UL — SIGNIFICANT CHANGE UP (ref 0–0.7)
EOSINOPHIL NFR BLD AUTO: 0.5 % — SIGNIFICANT CHANGE UP (ref 0–8)
HCT VFR BLD CALC: 43.7 % — SIGNIFICANT CHANGE UP (ref 37–47)
HGB BLD-MCNC: 13.8 G/DL — SIGNIFICANT CHANGE UP (ref 12–16)
IMM GRANULOCYTES NFR BLD AUTO: 0.2 % — SIGNIFICANT CHANGE UP (ref 0.1–0.3)
LYMPHOCYTES # BLD AUTO: 19.6 % — LOW (ref 20.5–51.1)
LYMPHOCYTES # BLD AUTO: 2.51 K/UL — SIGNIFICANT CHANGE UP (ref 1.2–3.4)
MCHC RBC-ENTMCNC: 28 PG — SIGNIFICANT CHANGE UP (ref 27–31)
MCHC RBC-ENTMCNC: 31.6 G/DL — LOW (ref 32–37)
MCV RBC AUTO: 88.6 FL — SIGNIFICANT CHANGE UP (ref 81–99)
MONOCYTES # BLD AUTO: 0.79 K/UL — HIGH (ref 0.1–0.6)
MONOCYTES NFR BLD AUTO: 6.2 % — SIGNIFICANT CHANGE UP (ref 1.7–9.3)
NEUTROPHILS # BLD AUTO: 9.38 K/UL — HIGH (ref 1.4–6.5)
NEUTROPHILS NFR BLD AUTO: 73.2 % — SIGNIFICANT CHANGE UP (ref 42.2–75.2)
NRBC # BLD: 0 /100 WBCS — SIGNIFICANT CHANGE UP (ref 0–0)
PLATELET # BLD AUTO: 214 K/UL — SIGNIFICANT CHANGE UP (ref 130–400)
RBC # BLD: 4.93 M/UL — SIGNIFICANT CHANGE UP (ref 4.2–5.4)
RBC # FLD: 13.2 % — SIGNIFICANT CHANGE UP (ref 11.5–14.5)
WBC # BLD: 12.82 K/UL — HIGH (ref 4.8–10.8)
WBC # FLD AUTO: 12.82 K/UL — HIGH (ref 4.8–10.8)

## 2023-02-13 ENCOUNTER — APPOINTMENT (OUTPATIENT)
Dept: NEUROSURGERY | Facility: CLINIC | Age: 38
End: 2023-02-13
Payer: OTHER MISCELLANEOUS

## 2023-02-13 ENCOUNTER — INPATIENT (INPATIENT)
Facility: HOSPITAL | Age: 38
LOS: 4 days | Discharge: HOME | DRG: 320 | End: 2023-02-18
Attending: STUDENT IN AN ORGANIZED HEALTH CARE EDUCATION/TRAINING PROGRAM | Admitting: STUDENT IN AN ORGANIZED HEALTH CARE EDUCATION/TRAINING PROGRAM
Payer: MEDICAID

## 2023-02-13 VITALS
DIASTOLIC BLOOD PRESSURE: 82 MMHG | TEMPERATURE: 98 F | RESPIRATION RATE: 18 BRPM | HEART RATE: 112 BPM | SYSTOLIC BLOOD PRESSURE: 138 MMHG | WEIGHT: 195.99 LBS | OXYGEN SATURATION: 98 % | HEIGHT: 66 IN

## 2023-02-13 VITALS — WEIGHT: 196 LBS | HEIGHT: 65 IN | BODY MASS INDEX: 32.65 KG/M2

## 2023-02-13 DIAGNOSIS — Z98.890 OTHER SPECIFIED POSTPROCEDURAL STATES: Chronic | ICD-10-CM

## 2023-02-13 DIAGNOSIS — M50.90 CERVICAL DISC DISORDER, UNSPECIFIED, UNSPECIFIED CERVICAL REGION: ICD-10-CM

## 2023-02-13 DIAGNOSIS — Z90.49 ACQUIRED ABSENCE OF OTHER SPECIFIED PARTS OF DIGESTIVE TRACT: Chronic | ICD-10-CM

## 2023-02-13 LAB
ALBUMIN SERPL ELPH-MCNC: 4.4 G/DL — SIGNIFICANT CHANGE UP (ref 3.5–5.2)
ALP SERPL-CCNC: 164 U/L — HIGH (ref 30–115)
ALT FLD-CCNC: 10 U/L — SIGNIFICANT CHANGE UP (ref 0–41)
ANION GAP SERPL CALC-SCNC: 14 MMOL/L — SIGNIFICANT CHANGE UP (ref 7–14)
APTT BLD: 36.1 SEC — SIGNIFICANT CHANGE UP (ref 27–39.2)
AST SERPL-CCNC: 25 U/L — SIGNIFICANT CHANGE UP (ref 0–41)
BASOPHILS # BLD AUTO: 0.05 K/UL — SIGNIFICANT CHANGE UP (ref 0–0.2)
BASOPHILS NFR BLD AUTO: 0.4 % — SIGNIFICANT CHANGE UP (ref 0–1)
BILIRUB SERPL-MCNC: <0.2 MG/DL — SIGNIFICANT CHANGE UP (ref 0.2–1.2)
BUN SERPL-MCNC: 14 MG/DL — SIGNIFICANT CHANGE UP (ref 10–20)
CALCIUM SERPL-MCNC: 9.2 MG/DL — SIGNIFICANT CHANGE UP (ref 8.4–10.5)
CHLORIDE SERPL-SCNC: 104 MMOL/L — SIGNIFICANT CHANGE UP (ref 98–110)
CO2 SERPL-SCNC: 20 MMOL/L — SIGNIFICANT CHANGE UP (ref 17–32)
CREAT SERPL-MCNC: 0.8 MG/DL — SIGNIFICANT CHANGE UP (ref 0.7–1.5)
EGFR: 96 ML/MIN/1.73M2 — SIGNIFICANT CHANGE UP
EOSINOPHIL # BLD AUTO: 0.04 K/UL — SIGNIFICANT CHANGE UP (ref 0–0.7)
EOSINOPHIL NFR BLD AUTO: 0.3 % — SIGNIFICANT CHANGE UP (ref 0–8)
GLUCOSE SERPL-MCNC: 92 MG/DL — SIGNIFICANT CHANGE UP (ref 70–99)
HCG SERPL QL: NEGATIVE — SIGNIFICANT CHANGE UP
HCT VFR BLD CALC: 40.6 % — SIGNIFICANT CHANGE UP (ref 37–47)
HGB BLD-MCNC: 13.5 G/DL — SIGNIFICANT CHANGE UP (ref 12–16)
IMM GRANULOCYTES NFR BLD AUTO: 0.4 % — HIGH (ref 0.1–0.3)
INR BLD: 1.03 RATIO — SIGNIFICANT CHANGE UP (ref 0.65–1.3)
LYMPHOCYTES # BLD AUTO: 16 % — LOW (ref 20.5–51.1)
LYMPHOCYTES # BLD AUTO: 2.02 K/UL — SIGNIFICANT CHANGE UP (ref 1.2–3.4)
MCHC RBC-ENTMCNC: 28.2 PG — SIGNIFICANT CHANGE UP (ref 27–31)
MCHC RBC-ENTMCNC: 33.3 G/DL — SIGNIFICANT CHANGE UP (ref 32–37)
MCV RBC AUTO: 84.8 FL — SIGNIFICANT CHANGE UP (ref 81–99)
MONOCYTES # BLD AUTO: 0.95 K/UL — HIGH (ref 0.1–0.6)
MONOCYTES NFR BLD AUTO: 7.5 % — SIGNIFICANT CHANGE UP (ref 1.7–9.3)
NEUTROPHILS # BLD AUTO: 9.55 K/UL — HIGH (ref 1.4–6.5)
NEUTROPHILS NFR BLD AUTO: 75.4 % — HIGH (ref 42.2–75.2)
NRBC # BLD: 0 /100 WBCS — SIGNIFICANT CHANGE UP (ref 0–0)
PLATELET # BLD AUTO: 266 K/UL — SIGNIFICANT CHANGE UP (ref 130–400)
POTASSIUM SERPL-MCNC: 6 MMOL/L — CRITICAL HIGH (ref 3.5–5)
POTASSIUM SERPL-SCNC: 6 MMOL/L — CRITICAL HIGH (ref 3.5–5)
PROT SERPL-MCNC: 7.2 G/DL — SIGNIFICANT CHANGE UP (ref 6–8)
PROTHROM AB SERPL-ACNC: 11.7 SEC — SIGNIFICANT CHANGE UP (ref 9.95–12.87)
RBC # BLD: 4.79 M/UL — SIGNIFICANT CHANGE UP (ref 4.2–5.4)
RBC # FLD: 13.4 % — SIGNIFICANT CHANGE UP (ref 11.5–14.5)
SARS-COV-2 RNA SPEC QL NAA+PROBE: SIGNIFICANT CHANGE UP
SODIUM SERPL-SCNC: 138 MMOL/L — SIGNIFICANT CHANGE UP (ref 135–146)
WBC # BLD: 12.66 K/UL — HIGH (ref 4.8–10.8)
WBC # FLD AUTO: 12.66 K/UL — HIGH (ref 4.8–10.8)

## 2023-02-13 PROCEDURE — 72040 X-RAY EXAM NECK SPINE 2-3 VW: CPT

## 2023-02-13 PROCEDURE — 80053 COMPREHEN METABOLIC PANEL: CPT

## 2023-02-13 PROCEDURE — 71045 X-RAY EXAM CHEST 1 VIEW: CPT | Mod: 26

## 2023-02-13 PROCEDURE — 71045 X-RAY EXAM CHEST 1 VIEW: CPT

## 2023-02-13 PROCEDURE — 85027 COMPLETE CBC AUTOMATED: CPT

## 2023-02-13 PROCEDURE — 86901 BLOOD TYPING SEROLOGIC RH(D): CPT

## 2023-02-13 PROCEDURE — 36415 COLL VENOUS BLD VENIPUNCTURE: CPT

## 2023-02-13 PROCEDURE — U0005: CPT

## 2023-02-13 PROCEDURE — 86850 RBC ANTIBODY SCREEN: CPT

## 2023-02-13 PROCEDURE — 82962 GLUCOSE BLOOD TEST: CPT

## 2023-02-13 PROCEDURE — 85025 COMPLETE CBC W/AUTO DIFF WBC: CPT

## 2023-02-13 PROCEDURE — 86900 BLOOD TYPING SEROLOGIC ABO: CPT

## 2023-02-13 PROCEDURE — 97161 PT EVAL LOW COMPLEX 20 MIN: CPT | Mod: GP

## 2023-02-13 PROCEDURE — 84703 CHORIONIC GONADOTROPIN ASSAY: CPT

## 2023-02-13 PROCEDURE — 72141 MRI NECK SPINE W/O DYE: CPT

## 2023-02-13 PROCEDURE — 80048 BASIC METABOLIC PNL TOTAL CA: CPT

## 2023-02-13 PROCEDURE — 85610 PROTHROMBIN TIME: CPT

## 2023-02-13 PROCEDURE — 99213 OFFICE O/P EST LOW 20 MIN: CPT

## 2023-02-13 PROCEDURE — 83036 HEMOGLOBIN GLYCOSYLATED A1C: CPT

## 2023-02-13 PROCEDURE — 99072 ADDL SUPL MATRL&STAF TM PHE: CPT

## 2023-02-13 PROCEDURE — 93005 ELECTROCARDIOGRAM TRACING: CPT

## 2023-02-13 PROCEDURE — C1889: CPT

## 2023-02-13 PROCEDURE — 93010 ELECTROCARDIOGRAM REPORT: CPT

## 2023-02-13 PROCEDURE — 97110 THERAPEUTIC EXERCISES: CPT | Mod: GP

## 2023-02-13 PROCEDURE — 85730 THROMBOPLASTIN TIME PARTIAL: CPT

## 2023-02-13 PROCEDURE — 81025 URINE PREGNANCY TEST: CPT

## 2023-02-13 PROCEDURE — U0003: CPT

## 2023-02-13 RX ORDER — GABAPENTIN 400 MG/1
800 CAPSULE ORAL THREE TIMES A DAY
Refills: 0 | Status: DISCONTINUED | OUTPATIENT
Start: 2023-02-13 | End: 2023-02-17

## 2023-02-13 RX ORDER — GLUCAGON INJECTION, SOLUTION 0.5 MG/.1ML
1 INJECTION, SOLUTION SUBCUTANEOUS ONCE
Refills: 0 | Status: DISCONTINUED | OUTPATIENT
Start: 2023-02-13 | End: 2023-02-17

## 2023-02-13 RX ORDER — SODIUM CHLORIDE 9 MG/ML
1000 INJECTION, SOLUTION INTRAVENOUS
Refills: 0 | Status: DISCONTINUED | OUTPATIENT
Start: 2023-02-13 | End: 2023-02-17

## 2023-02-13 RX ORDER — METFORMIN HYDROCHLORIDE 850 MG/1
500 TABLET ORAL DAILY
Refills: 0 | Status: DISCONTINUED | OUTPATIENT
Start: 2023-02-13 | End: 2023-02-14

## 2023-02-13 RX ORDER — NORTRIPTYLINE HYDROCHLORIDE 10 MG/1
25 CAPSULE ORAL DAILY
Refills: 0 | Status: DISCONTINUED | OUTPATIENT
Start: 2023-02-13 | End: 2023-02-17

## 2023-02-13 RX ORDER — HEPARIN SODIUM 5000 [USP'U]/ML
5000 INJECTION INTRAVENOUS; SUBCUTANEOUS EVERY 12 HOURS
Refills: 0 | Status: DISCONTINUED | OUTPATIENT
Start: 2023-02-13 | End: 2023-02-17

## 2023-02-13 RX ORDER — ACETAMINOPHEN 500 MG
650 TABLET ORAL EVERY 6 HOURS
Refills: 0 | Status: DISCONTINUED | OUTPATIENT
Start: 2023-02-13 | End: 2023-02-17

## 2023-02-13 RX ORDER — KETOROLAC TROMETHAMINE 30 MG/ML
30 SYRINGE (ML) INJECTION ONCE
Refills: 0 | Status: DISCONTINUED | OUTPATIENT
Start: 2023-02-13 | End: 2023-02-13

## 2023-02-13 RX ORDER — GABAPENTIN 400 MG/1
800 CAPSULE ORAL ONCE
Refills: 0 | Status: COMPLETED | OUTPATIENT
Start: 2023-02-13 | End: 2023-02-13

## 2023-02-13 RX ORDER — OXYCODONE AND ACETAMINOPHEN 5; 325 MG/1; MG/1
1 TABLET ORAL EVERY 4 HOURS
Refills: 0 | Status: DISCONTINUED | OUTPATIENT
Start: 2023-02-13 | End: 2023-02-17

## 2023-02-13 RX ORDER — INSULIN LISPRO 100/ML
VIAL (ML) SUBCUTANEOUS
Refills: 0 | Status: DISCONTINUED | OUTPATIENT
Start: 2023-02-13 | End: 2023-02-17

## 2023-02-13 RX ORDER — DEXTROSE 50 % IN WATER 50 %
15 SYRINGE (ML) INTRAVENOUS ONCE
Refills: 0 | Status: DISCONTINUED | OUTPATIENT
Start: 2023-02-13 | End: 2023-02-17

## 2023-02-13 RX ORDER — ALBUTEROL 90 UG/1
2 AEROSOL, METERED ORAL EVERY 6 HOURS
Refills: 0 | Status: DISCONTINUED | OUTPATIENT
Start: 2023-02-13 | End: 2023-02-17

## 2023-02-13 RX ORDER — SENNA PLUS 8.6 MG/1
2 TABLET ORAL AT BEDTIME
Refills: 0 | Status: DISCONTINUED | OUTPATIENT
Start: 2023-02-13 | End: 2023-02-17

## 2023-02-13 RX ORDER — DEXTROSE 50 % IN WATER 50 %
25 SYRINGE (ML) INTRAVENOUS ONCE
Refills: 0 | Status: DISCONTINUED | OUTPATIENT
Start: 2023-02-13 | End: 2023-02-17

## 2023-02-13 RX ORDER — MONTELUKAST 4 MG/1
10 TABLET, CHEWABLE ORAL DAILY
Refills: 0 | Status: DISCONTINUED | OUTPATIENT
Start: 2023-02-13 | End: 2023-02-17

## 2023-02-13 RX ORDER — DEXTROSE 50 % IN WATER 50 %
12.5 SYRINGE (ML) INTRAVENOUS ONCE
Refills: 0 | Status: DISCONTINUED | OUTPATIENT
Start: 2023-02-13 | End: 2023-02-17

## 2023-02-13 RX ORDER — METHOCARBAMOL 500 MG/1
500 TABLET, FILM COATED ORAL EVERY 8 HOURS
Refills: 0 | Status: DISCONTINUED | OUTPATIENT
Start: 2023-02-13 | End: 2023-02-17

## 2023-02-13 RX ORDER — CITALOPRAM 10 MG/1
40 TABLET, FILM COATED ORAL DAILY
Refills: 0 | Status: DISCONTINUED | OUTPATIENT
Start: 2023-02-13 | End: 2023-02-17

## 2023-02-13 RX ADMIN — GABAPENTIN 800 MILLIGRAM(S): 400 CAPSULE ORAL at 12:27

## 2023-02-13 RX ADMIN — Medication 30 MILLIGRAM(S): at 12:27

## 2023-02-13 RX ADMIN — Medication 30 MILLIGRAM(S): at 14:00

## 2023-02-13 RX ADMIN — GABAPENTIN 800 MILLIGRAM(S): 400 CAPSULE ORAL at 21:37

## 2023-02-13 NOTE — ED PROVIDER NOTE - TEST CONSIDERED BUT NOT PERFORMED
Tests Considered But Not Performed xray was considered however patient has recent mri showing findings.

## 2023-02-13 NOTE — ED PROVIDER NOTE - OBJECTIVE STATEMENT
Pt is a 38y/o female with a pmhx of cervical disc disease., dm  here for eval of neck pain with radiation down both arms. Pt sts pain began getting worse in left arm which prompted visit to see Dr. Mejia today and subsequently was sent to ED for admission. Pt has scheduled laminoforamenotomy of c5 c6 on 2/15. Pt denies fever chills, weakness, numbness

## 2023-02-13 NOTE — H&P ADULT - ASSESSMENT
38y/o female with a pmhx of cervical disc disease., dm  here for eval of neck pain with radiation down both arms. Pt sts pain began getting worse in left arm which prompted visit to see Dr. Mejia today and subsequently was sent to ED for admission. Pt has scheduled laminoforamenotomy of c5 c6 on 2/15.    -preop labs and medical clearance  -hospital medical co-management  -MRI of cervical spine ordered  -pain management as needed

## 2023-02-13 NOTE — PATIENT PROFILE ADULT - FALL HARM RISK - HARM RISK INTERVENTIONS

## 2023-02-13 NOTE — ED PROVIDER NOTE - CONSIDERATION OF ADMISSION OBSERVATION
Admission observation was considered given continued pain and stenosis of MRI Consideration of Admission/Observation

## 2023-02-13 NOTE — ED PROVIDER NOTE - CLINICAL SUMMARY MEDICAL DECISION MAKING FREE TEXT BOX
patient with worsening arm pain with hx of cervical stenosis that is significant. patient admitted to [facility] for operative management given continued pain.

## 2023-02-13 NOTE — H&P ADULT - NSHPLABSRESULTS_GEN_ALL_CORE
13.5   12.66 )-----------( 266      ( 13 Feb 2023 12:10 )             40.6     02-13    138  |  104  |  14  ----------------------------<  92  6.0<HH>   |  20  |  0.8    Ca    9.2      13 Feb 2023 12:10    TPro  7.2  /  Alb  4.4  /  TBili  <0.2  /  DBili  x   /  AST  25  /  ALT  10  /  AlkPhos  164<H>  02-13      PT/INR - ( 13 Feb 2023 12:10 )   PT: 11.70 sec;   INR: 1.03 ratio         PTT - ( 13 Feb 2023 12:10 )  PTT:36.1 sec

## 2023-02-13 NOTE — H&P ADULT - NSHPPHYSICALEXAM_GEN_ALL_CORE
awake, alert and oriented x3, follows simple commands   PERRL, EOMI  no tenderness on palpation to cervical, thoracic and lumbar region  moves right upper arm with pain limited ROM  right deltoid 3/5, tricep/bicep 3/5 and  strength 4/5  left deltoid 4/5, tricep/bicep 4/5 and  strength 5/5  bilateral lower extremities  5/5 strength   decrease sensation to right upper extremity compared to left   no Valentine's or hyperreflexic

## 2023-02-13 NOTE — ED PROVIDER NOTE - PROGRESS NOTE DETAILS
spoke with neurosurg, request pre-op labs/imaging and admission to Dr. Hester service. Neurosurg team will follow labs/imaging

## 2023-02-13 NOTE — ED ADULT NURSE NOTE - OBJECTIVE STATEMENT
Pt c/o neck and arm pain x 3 years, worse today. Pt states she had an injury at work in 2019 and has been dealing with neck and nerve pain since. Pt went to her doctor this morning and was told to come here to see a specialist

## 2023-02-13 NOTE — ED PROVIDER NOTE - ATTENDING APP SHARED VISIT CONTRIBUTION OF CARE
36y/o female with a pmhx of cervical disc disease., dm  here for eval of neck pain with radiation down both arms. Pt sts pain began getting worse in left arm which prompted visit to see Dr. Mejia today and subsequently was sent to ED for admission. Pt has scheduled laminoforamenotomy of c5 c6 on 2/15. Exam shows no focal weakness in the upper extremities no numbness.  Plan is to obtain labs preop EKG x-ray control pain with morphine and consult neurosurgery for possible surgical intervention

## 2023-02-13 NOTE — H&P ADULT - HISTORY OF PRESENT ILLNESS
36y/o female with a pmhx of cervical disc disease., DM,  here for eval of neck pain with radiation down both arms. Pt sts pain began getting worse in left arm which prompted visit to see Dr. Mejia today and subsequently was sent to ED for admission. Pt has scheduled laminoforamenotomy of C5 C6 on 2/15.   Pt complaints of right arm pain since 2019 and progressively worsening and for the last 3 weeks, she also developing left arm pain with numbness and tingling, denied any B/B dysfunction. No difficulty walking.

## 2023-02-13 NOTE — HISTORY OF PRESENT ILLNESS
[FreeTextEntry1] : This is a 37-year-old female who presents for neurosurgical revisit.  As a review, she suffers from severe neck pain with associated radiating pain complaints into the right upper extremity.  Per her last assessment, she had been recommended to proceed with a laminoforaminotomy at C5-6 and appears as though insurance authorization has stalled.  She has undergone conservative efforts in the form of physical therapy as well as interventional treatments through a pain management specialist in the form of cervical epidurals which have also failed to provide relief.  Medication in the form of OTC NSAIDs as well as prescription strength medications have also failed to reduce the intensity of her pain.\par \par She presents at this time tearful noting severe discomfort as well as worsening physical limitations with regards to the right upper extremity.  She notes pronounced difficulty with raising the right arm.  Strength deficit worsening over the previous 2 to 3 weeks.  She notes fine manipulation limitations and issues with self-care due to the extent of her discomfort and progressive weakness.\par \par MRI cervical–regional radiology–October 12, 2022–evidence of central/right paracentral disc osteophyte complex at C5-6 with marked right foraminal narrowing noted.\par \par PHYSICAL EXAM: \par \par Constitutional: Well appearing, (+) painful distress. Tearful during my exam.\par HEENT: Normocephalic Atraumatic\par Psychiatric: Alert and oriented to all spheres, normal mood\par Pulmonary: No respiratory distress\par \par Neurologic: \par CN II-XII grossly intact\par Palpation: cervical paravertebral tenderness to palpation\par Strength: unable to move right upper extremity, strength significantly decreased at 3-/5 (or less) in all muscle groups tested. LUE 4/5 throughout.\par Sensation: Full sensation to light touch in all extremities\par  No Valentine's\par  No clonus\par \par ROM limited in all ROM due to pain\par \par Gait: steady, walking w/o assistance.\par

## 2023-02-13 NOTE — ED PROVIDER NOTE - PHYSICAL EXAMINATION
VITAL SIGNS: I have reviewed nursing notes and confirm.  CONSTITUTIONAL: Well-developed; well-nourished; in no acute distress.   SKIN: skin exam is warm and dry, no acute rash.    HEAD: Normocephalic; atraumatic.  EYES:  conjunctiva and sclera clear.  ENT: No nasal discharge; airway clear.  NECK: TTP paraspinally, + midline tenderness   CARD: S1, S2 normal; no murmurs, gallops, or rubs. Regular rate and rhythm.   RESP: No wheezes, rales or rhonchi.  EXT: Normal ROM.  No clubbing, cyanosis or edema.   NEURO: Alert, oriented, grossly unremarkable

## 2023-02-13 NOTE — ASSESSMENT
[FreeTextEntry1] : This is a 37-year-old female who presents for neurosurgical revisit, she is indicated for a C5-6 laminoforaminotomy which had been scheduled to take place on February 15, 2023 but appears as though that there is an insurance hold-up.  Patient is tearful and in intractable pain at this time.  Due to her concerning progressive weakness involving the right upper extremity I have transferred her to the emergency department for prompt care.  Stat MR C-spine recommended for my review and I have consulted with Dr. Hester who may consider stat surgery if necessary.\par \par Patient expresses understanding to the above-mentioned plan and is agreeable to proceed.\par \par Shalonda Shah PA-C\par Jodi Hester MD

## 2023-02-13 NOTE — ED PROVIDER NOTE - OTHER RECORDS SUMMARY FREE TEXT FOR MDM OBTAINED AND REVIEWED OLD RECORDS QUESTION
Outpatient neurosurgery notes reviewed from November 17, 2022 which showed MRI significant for severe C5-C6 stenosis with discussion for laminoforaminotomy

## 2023-02-14 LAB
ANION GAP SERPL CALC-SCNC: 7 MMOL/L — SIGNIFICANT CHANGE UP (ref 7–14)
BUN SERPL-MCNC: 13 MG/DL — SIGNIFICANT CHANGE UP (ref 10–20)
CALCIUM SERPL-MCNC: 9.1 MG/DL — SIGNIFICANT CHANGE UP (ref 8.4–10.4)
CHLORIDE SERPL-SCNC: 101 MMOL/L — SIGNIFICANT CHANGE UP (ref 98–110)
CO2 SERPL-SCNC: 27 MMOL/L — SIGNIFICANT CHANGE UP (ref 17–32)
CREAT SERPL-MCNC: 0.6 MG/DL — LOW (ref 0.7–1.5)
EGFR: 117 ML/MIN/1.73M2 — SIGNIFICANT CHANGE UP
GLUCOSE BLDC GLUCOMTR-MCNC: 104 MG/DL — HIGH (ref 70–99)
GLUCOSE BLDC GLUCOMTR-MCNC: 158 MG/DL — HIGH (ref 70–99)
GLUCOSE BLDC GLUCOMTR-MCNC: 81 MG/DL — SIGNIFICANT CHANGE UP (ref 70–99)
GLUCOSE BLDC GLUCOMTR-MCNC: 89 MG/DL — SIGNIFICANT CHANGE UP (ref 70–99)
GLUCOSE BLDC GLUCOMTR-MCNC: 91 MG/DL — SIGNIFICANT CHANGE UP (ref 70–99)
GLUCOSE BLDC GLUCOMTR-MCNC: 96 MG/DL — SIGNIFICANT CHANGE UP (ref 70–99)
GLUCOSE SERPL-MCNC: 105 MG/DL — HIGH (ref 70–99)
HCT VFR BLD CALC: 41.2 % — SIGNIFICANT CHANGE UP (ref 37–47)
HGB BLD-MCNC: 13.3 G/DL — SIGNIFICANT CHANGE UP (ref 12–16)
MCHC RBC-ENTMCNC: 28.1 PG — SIGNIFICANT CHANGE UP (ref 27–31)
MCHC RBC-ENTMCNC: 32.3 G/DL — SIGNIFICANT CHANGE UP (ref 32–37)
MCV RBC AUTO: 86.9 FL — SIGNIFICANT CHANGE UP (ref 81–99)
NRBC # BLD: 0 /100 WBCS — SIGNIFICANT CHANGE UP (ref 0–0)
PLATELET # BLD AUTO: 277 K/UL — SIGNIFICANT CHANGE UP (ref 130–400)
POTASSIUM SERPL-MCNC: 4.1 MMOL/L — SIGNIFICANT CHANGE UP (ref 3.5–5)
POTASSIUM SERPL-SCNC: 4.1 MMOL/L — SIGNIFICANT CHANGE UP (ref 3.5–5)
RBC # BLD: 4.74 M/UL — SIGNIFICANT CHANGE UP (ref 4.2–5.4)
RBC # FLD: 13.2 % — SIGNIFICANT CHANGE UP (ref 11.5–14.5)
SODIUM SERPL-SCNC: 135 MMOL/L — SIGNIFICANT CHANGE UP (ref 135–146)
WBC # BLD: 11.61 K/UL — HIGH (ref 4.8–10.8)
WBC # FLD AUTO: 11.61 K/UL — HIGH (ref 4.8–10.8)

## 2023-02-14 PROCEDURE — 72141 MRI NECK SPINE W/O DYE: CPT | Mod: 26

## 2023-02-14 PROCEDURE — 99253 IP/OBS CNSLTJ NEW/EST LOW 45: CPT

## 2023-02-14 RX ORDER — CALCIUM CARBONATE 500(1250)
1 TABLET ORAL ONCE
Refills: 0 | Status: COMPLETED | OUTPATIENT
Start: 2023-02-14 | End: 2023-02-15

## 2023-02-14 RX ADMIN — GABAPENTIN 800 MILLIGRAM(S): 400 CAPSULE ORAL at 14:24

## 2023-02-14 RX ADMIN — CITALOPRAM 40 MILLIGRAM(S): 10 TABLET, FILM COATED ORAL at 11:24

## 2023-02-14 RX ADMIN — GABAPENTIN 800 MILLIGRAM(S): 400 CAPSULE ORAL at 05:06

## 2023-02-14 RX ADMIN — GABAPENTIN 800 MILLIGRAM(S): 400 CAPSULE ORAL at 21:12

## 2023-02-14 RX ADMIN — MONTELUKAST 10 MILLIGRAM(S): 4 TABLET, CHEWABLE ORAL at 11:24

## 2023-02-14 RX ADMIN — NORTRIPTYLINE HYDROCHLORIDE 25 MILLIGRAM(S): 10 CAPSULE ORAL at 21:12

## 2023-02-14 RX ADMIN — SENNA PLUS 2 TABLET(S): 8.6 TABLET ORAL at 21:13

## 2023-02-14 RX ADMIN — Medication 2: at 13:38

## 2023-02-14 NOTE — CONSULT NOTE ADULT - ASSESSMENT
36y/o female with a pmhx of cervical disc disease., dm  here for eval of neck pain with radiation down both arms. Pt sts pain began getting worse in left arm which prompted visit to see Dr. Mejia today and subsequently was sent to ED for admission. Pt has scheduled laminoforamenotomy of c5 c6 on 2/15. Medicine consulted for comanagement/medical clearance.     #Cervical spine disease, neck pain radiating to both arms  -MR c-spine  -pain mgmt recommended  -c/w home pain meds   -rest of plan per neurosx    #Pre-DM  -can check A1c  -only on Metformin 500mg po qd at home; will hold while here   -add insulin per protocol prn; on ISS, can continue     #Hyperkalemia  -hemolyzed  -please repeat     #Obesity  -BMI 33.5  -counselled on diet and exercise for weight loss  -outpatient dietitian eval recommended    #SPENSER opacity vs overlying ribs  -can do o/p CT scan chest     #Leukocytosis  -no signs of infection, afebrile  -trend  -if spikes fever send sepsis workup (blood cultures, UA, UCx, CXR, abx)     #DVT PPx: Heparin 5000u sq q12h      Planned Procedure C5-C6 laminoforamenectomy      Does the patient have the following conditions? Type Y or N    __N__DVT/PE           	  Currently anticoagulated ______ IVC Filter _______ Date:______    _PRE-DM (N)___DM                             Controlled    Y ____Y___ N _______    _N___HTN	                              Controlled    Y _______ N _______    __N__CAD	                                  Prior MI  _____CABG _____STENT  ______ EF _____    _N___CHF                             Chronic _____ Acute _____ EF ______    _N___Afib	                                  Current Rhythm _________   Current anticoagulation _________    _N___PPM/ICD                         Indication ___________  last Interrogated _________    _N___OSA	                              PAP  Type &Settings _____________    _N___Asthma/COPD	          Last Exac _______ Last Steroid use Date _______     _N___O2 dependent	          Reason ______________    __N__CKD or ENEIDA	                  Stable Y _____  N ______    _Y___Electrolyte Abn	          Type _HYPERKALEMIA________     Stable Y ____PLEASE REPEAT K (HEMOLYZED)__   N _______    _N___Cirrhosis	                   Cause __________     Stable Y ______   N _______    _N___GI Bleed                          Cause __________     Stable Y ______   N _______    _N___Anemia	                          Cause __________     Stable Y ______   N _______    _N___Transfusion                  Last date ________      _N___ETOH Use	                  Last date________     Intervention __________________________    _N___Tobacco Abuse	          Last date ________    Intervention __________________________    _N___Drug Use	                 Type____________  Last dose _____ Intervention______________    ____Other                             Details_________________________________________________      Duke Activity Status Index: Can the patient climb a flight of stairs or walk uphill?   ______ N   <4 METS   __Y_____ Y > 4 METS      Risk Assessment: (Use One)    [employer] NSQIP Surgical Risk Calculator http://riskcalculator.facs.org/  2.0% risk for any complication (average 3.5% risk) for moderate risk procedure.      ___Y_____  The patient is optimized for surgery/procedure and may proceed to the operating room as scheduled    _________The patient is NOT optimized for surgery/procedure and should not proceed to the operating room as scheduled      This consult serves only as a rachael-operative medical risk stratification and evaluation to predict medical complications risk and mortality. The decision to proceed with the surgery/procedure is made by the performing physician and the patient. If any acute changes happen prior to surgery, this medical clearance is void and will need a new medical clearance.

## 2023-02-14 NOTE — PROGRESS NOTE ADULT - SUBJECTIVE AND OBJECTIVE BOX
HPI: 38y/o female with a pmhx of cervical disc disease., DM,  here for eval of neck pain with radiation down both arms. Pt sts pain began getting worse in left arm which prompted visit to see Dr. Mejia today and subsequently was sent to ED for admission. Pt has scheduled laminoforamenotomy of C5 C6 on 2/15.   Pt complaints of right arm pain since 2019 and progressively worsening and for the last 3 weeks, she also developing left arm pain with numbness and tingling, denied any B/B dysfunction. No difficulty walking. (13 Feb 2023 14:33)    PAST MEDICAL & SURGICAL HISTORY:  Anxiety and depression    H/O migraine    History of posttraumatic stress disorder (PTSD)    Cervical spondylosis with radiculopathy    Asthma, intermittent    DM (diabetes mellitus)    History of cholecystectomy    H/O knee surgery    H/O rotator cuff surgery    HEALTH ISSUES - PROBLEM Dx:    FAMILY HISTORY:  FH: lung cancer (Father)    Allergies    iodine (Other (Severe))  sulfa drugs (Anaphylaxis)    Intolerances    REVIEW OF SYSTEMS : As listed in HPI  Head and Neck:   Cardio :   Pum :  GI:  Neuro:     MEDICATIONS  (STANDING):  citalopram 40 milliGRAM(s) Oral daily  dextrose 5%. 1000 milliLiter(s) (50 mL/Hr) IV Continuous <Continuous>  dextrose 5%. 1000 milliLiter(s) (100 mL/Hr) IV Continuous <Continuous>  dextrose 50% Injectable 25 Gram(s) IV Push once  dextrose 50% Injectable 12.5 Gram(s) IV Push once  dextrose 50% Injectable 25 Gram(s) IV Push once  gabapentin 800 milliGRAM(s) Oral three times a day  glucagon  Injectable 1 milliGRAM(s) IntraMuscular once  heparin   Injectable 5000 Unit(s) SubCutaneous every 12 hours  insulin lispro (ADMELOG) corrective regimen sliding scale   SubCutaneous Before meals and at bedtime  metFORMIN 500 milliGRAM(s) Oral daily  montelukast 10 milliGRAM(s) Oral daily  nortriptyline 25 milliGRAM(s) Oral daily  senna 2 Tablet(s) Oral at bedtime    MEDICATIONS  (PRN):  acetaminophen     Tablet .. 650 milliGRAM(s) Oral every 6 hours PRN Temp greater or equal to 38.5C (101.3F), Mild Pain (1 - 3)  albuterol    90 MICROgram(s) HFA Inhaler 2 Puff(s) Inhalation every 6 hours PRN Wheezing  dextrose Oral Gel 15 Gram(s) Oral once PRN Blood Glucose LESS THAN 70 milliGRAM(s)/deciliter  methocarbamol 500 milliGRAM(s) Oral every 8 hours PRN Muscle Spasm  oxycodone    5 mG/acetaminophen 325 mG 1 Tablet(s) Oral every 4 hours PRN Moderate Pain (4 - 6)  oxycodone    5 mG/acetaminophen 325 mG 2 Tablet(s) Oral every 6 hours PRN Severe Pain (7 - 10)    Anticoagulation:  heparin   Injectable 5000 Unit(s) SubCutaneous every 12 hours    Vital Signs Last 24 Hrs  T(C): 36 (14 Feb 2023 04:53), Max: 36.8 (13 Feb 2023 10:53)  T(F): 96.8 (14 Feb 2023 04:53), Max: 98.2 (13 Feb 2023 10:53)  HR: 88 (14 Feb 2023 04:53) (72 - 112)  BP: 113/73 (14 Feb 2023 04:53) (113/73 - 138/82)  BP(mean): --  RR: 18 (14 Feb 2023 04:53) (18 - 18)  SpO2: 96% (14 Feb 2023 04:53) (96% - 100%)    Parameters below as of 13 Feb 2023 10:53  Patient On (Oxygen Delivery Method): room air    Physical Exam :  General :   A&O x 3  Tongue midline  Facial features symmetric, No droop  Speech clear and appropriate, no slur   Pt speaking in full sentences   Follows all commands   Occular :   PERRLA, EOMI   Motor :   MAEx4   No spinal point tenderness   Sensory :  Intact bilaterally       LABS:                        13.5   12.66 )-----------( 266      ( 13 Feb 2023 12:10 )             40.6     02-13    138  |  104  |  14  ----------------------------<  92  6.0<HH>   |  20  |  0.8    Ca    9.2      13 Feb 2023 12:10    TPro  7.2  /  Alb  4.4  /  TBili  <0.2  /  DBili  x   /  AST  25  /  ALT  10  /  AlkPhos  164<H>  02-13    PT/INR - ( 13 Feb 2023 12:10 )   PT: 11.70 sec;   INR: 1.03 ratio      PTT - ( 13 Feb 2023 12:10 )  PTT:36.1 sec    CULTURES:    RADIOLOGY & ADDITIONAL STUDIES:  < from: Xray Chest 1 View- PORTABLE-Urgent (Xray Chest 1 View- PORTABLE-Urgent .) (02.13.23 @ 13:29) >    Impression:    Focal opacity left upper lobe versus overlapping ribs. Correlate with CAT   scan of thorax.  Lungs otherwise clear.      --- End of Report ---    LATIA LOYOLA MD; Attending Radiologist  This document has been electronically signed. Feb 13 2023  2:42PM    < end of copied text >    Assessment / Plan: Scheduled for C5-6 Laminoforaminotomy 2.15.23 with Dr. Hester   - MRI C spine pending   - Hospitalist for comanagement   - K+ hemolyzed repeat ordered   - Preop Labs pending   - NPO @ midnight tonight for OR tomorrow      HPI: 36y/o female with a pmhx of cervical disc disease., DM,  here for eval of neck pain with radiation down both arms. Pt sts pain began getting worse in left arm which prompted visit to see Dr. Mejia today and subsequently was sent to ED for admission. Pt has scheduled laminoforamenotomy of C5 C6 on 2/15.   Pt complaints of right arm pain since 2019 and progressively worsening and for the last 3 weeks, she also developing left arm pain with numbness and tingling, denied any B/B dysfunction. No difficulty walking. (13 Feb 2023 14:33)    PAST MEDICAL & SURGICAL HISTORY:  Anxiety and depression    H/O migraine    History of posttraumatic stress disorder (PTSD)    Cervical spondylosis with radiculopathy    Asthma, intermittent    DM (diabetes mellitus)    History of cholecystectomy    H/O knee surgery    H/O rotator cuff surgery    HEALTH ISSUES - PROBLEM Dx:    FAMILY HISTORY:  FH: lung cancer (Father)    Allergies    iodine (Other (Severe))  sulfa drugs (Anaphylaxis)    Intolerances    REVIEW OF SYSTEMS : As listed in HPI  Head and Neck:   Cardio :   Pum :  GI:  Neuro:     MEDICATIONS  (STANDING):  citalopram 40 milliGRAM(s) Oral daily  dextrose 5%. 1000 milliLiter(s) (50 mL/Hr) IV Continuous <Continuous>  dextrose 5%. 1000 milliLiter(s) (100 mL/Hr) IV Continuous <Continuous>  dextrose 50% Injectable 25 Gram(s) IV Push once  dextrose 50% Injectable 12.5 Gram(s) IV Push once  dextrose 50% Injectable 25 Gram(s) IV Push once  gabapentin 800 milliGRAM(s) Oral three times a day  glucagon  Injectable 1 milliGRAM(s) IntraMuscular once  heparin   Injectable 5000 Unit(s) SubCutaneous every 12 hours  insulin lispro (ADMELOG) corrective regimen sliding scale   SubCutaneous Before meals and at bedtime  metFORMIN 500 milliGRAM(s) Oral daily  montelukast 10 milliGRAM(s) Oral daily  nortriptyline 25 milliGRAM(s) Oral daily  senna 2 Tablet(s) Oral at bedtime    MEDICATIONS  (PRN):  acetaminophen     Tablet .. 650 milliGRAM(s) Oral every 6 hours PRN Temp greater or equal to 38.5C (101.3F), Mild Pain (1 - 3)  albuterol    90 MICROgram(s) HFA Inhaler 2 Puff(s) Inhalation every 6 hours PRN Wheezing  dextrose Oral Gel 15 Gram(s) Oral once PRN Blood Glucose LESS THAN 70 milliGRAM(s)/deciliter  methocarbamol 500 milliGRAM(s) Oral every 8 hours PRN Muscle Spasm  oxycodone    5 mG/acetaminophen 325 mG 1 Tablet(s) Oral every 4 hours PRN Moderate Pain (4 - 6)  oxycodone    5 mG/acetaminophen 325 mG 2 Tablet(s) Oral every 6 hours PRN Severe Pain (7 - 10)    Anticoagulation:  heparin   Injectable 5000 Unit(s) SubCutaneous every 12 hours    Vital Signs Last 24 Hrs  T(C): 36 (14 Feb 2023 04:53), Max: 36.8 (13 Feb 2023 10:53)  T(F): 96.8 (14 Feb 2023 04:53), Max: 98.2 (13 Feb 2023 10:53)  HR: 88 (14 Feb 2023 04:53) (72 - 112)  BP: 113/73 (14 Feb 2023 04:53) (113/73 - 138/82)  BP(mean): --  RR: 18 (14 Feb 2023 04:53) (18 - 18)  SpO2: 96% (14 Feb 2023 04:53) (96% - 100%)    Parameters below as of 13 Feb 2023 10:53  Patient On (Oxygen Delivery Method): room air    Physical Exam :  General :   A&O x 3  Tongue midline  Facial features symmetric, No droop  Speech clear and appropriate, no slur   Pt speaking in full sentences   Follows all commands   Occular :   PERRLA, EOMI   Motor :   MAEx4   No spinal point tenderness   Sensory :  Intact bilaterally       LABS:                        13.5   12.66 )-----------( 266      ( 13 Feb 2023 12:10 )             40.6     02-13    138  |  104  |  14  ----------------------------<  92  6.0<HH>   |  20  |  0.8    Ca    9.2      13 Feb 2023 12:10    TPro  7.2  /  Alb  4.4  /  TBili  <0.2  /  DBili  x   /  AST  25  /  ALT  10  /  AlkPhos  164<H>  02-13    PT/INR - ( 13 Feb 2023 12:10 )   PT: 11.70 sec;   INR: 1.03 ratio      PTT - ( 13 Feb 2023 12:10 )  PTT:36.1 sec    CULTURES:    RADIOLOGY & ADDITIONAL STUDIES:  < from: Xray Chest 1 View- PORTABLE-Urgent (Xray Chest 1 View- PORTABLE-Urgent .) (02.13.23 @ 13:29) >    Impression:    Focal opacity left upper lobe versus overlapping ribs. Correlate with CAT   scan of thorax.  Lungs otherwise clear.      --- End of Report ---    LATIA LOYOLA MD; Attending Radiologist  This document has been electronically signed. Feb 13 2023  2:42PM    < end of copied text >    Assessment / Plan: Updated plan no OR tomorrow may eat,   - MRI C spine pending   - Dr. veloz pain management consult   - Hospitalist for comanagement   - K+ hemolyzed repeat ordered

## 2023-02-14 NOTE — CONSULT NOTE ADULT - TIME BILLING
Total time spent to complete patient's bedside assessment, physical examination, review medical chart including labs & imaging, discuss medical plan of care with housestaff was more than 55 minutes

## 2023-02-15 ENCOUNTER — APPOINTMENT (OUTPATIENT)
Dept: NEUROSURGERY | Facility: HOSPITAL | Age: 38
End: 2023-02-15

## 2023-02-15 DIAGNOSIS — M54.12 RADICULOPATHY, CERVICAL REGION: ICD-10-CM

## 2023-02-15 LAB
A1C WITH ESTIMATED AVERAGE GLUCOSE RESULT: 5.5 % — SIGNIFICANT CHANGE UP (ref 4–5.6)
ANION GAP SERPL CALC-SCNC: 12 MMOL/L — SIGNIFICANT CHANGE UP (ref 7–14)
APTT BLD: 39.9 SEC — HIGH (ref 27–39.2)
BUN SERPL-MCNC: 17 MG/DL — SIGNIFICANT CHANGE UP (ref 10–20)
CALCIUM SERPL-MCNC: 9.3 MG/DL — SIGNIFICANT CHANGE UP (ref 8.4–10.5)
CHLORIDE SERPL-SCNC: 99 MMOL/L — SIGNIFICANT CHANGE UP (ref 98–110)
CO2 SERPL-SCNC: 26 MMOL/L — SIGNIFICANT CHANGE UP (ref 17–32)
CREAT SERPL-MCNC: 1.1 MG/DL — SIGNIFICANT CHANGE UP (ref 0.7–1.5)
EGFR: 66 ML/MIN/1.73M2 — SIGNIFICANT CHANGE UP
ESTIMATED AVERAGE GLUCOSE: 111 MG/DL — SIGNIFICANT CHANGE UP (ref 68–114)
GLUCOSE BLDC GLUCOMTR-MCNC: 86 MG/DL — SIGNIFICANT CHANGE UP (ref 70–99)
GLUCOSE BLDC GLUCOMTR-MCNC: 95 MG/DL — SIGNIFICANT CHANGE UP (ref 70–99)
GLUCOSE SERPL-MCNC: 87 MG/DL — SIGNIFICANT CHANGE UP (ref 70–99)
HCT VFR BLD CALC: 44.7 % — SIGNIFICANT CHANGE UP (ref 37–47)
HGB BLD-MCNC: 14.2 G/DL — SIGNIFICANT CHANGE UP (ref 12–16)
INR BLD: 1.12 RATIO — SIGNIFICANT CHANGE UP (ref 0.65–1.3)
MCHC RBC-ENTMCNC: 27.7 PG — SIGNIFICANT CHANGE UP (ref 27–31)
MCHC RBC-ENTMCNC: 31.8 G/DL — LOW (ref 32–37)
MCV RBC AUTO: 87.3 FL — SIGNIFICANT CHANGE UP (ref 81–99)
NRBC # BLD: 0 /100 WBCS — SIGNIFICANT CHANGE UP (ref 0–0)
PLATELET # BLD AUTO: 301 K/UL — SIGNIFICANT CHANGE UP (ref 130–400)
POTASSIUM SERPL-MCNC: 4 MMOL/L — SIGNIFICANT CHANGE UP (ref 3.5–5)
POTASSIUM SERPL-SCNC: 4 MMOL/L — SIGNIFICANT CHANGE UP (ref 3.5–5)
PROTHROM AB SERPL-ACNC: 12.8 SEC — SIGNIFICANT CHANGE UP (ref 9.95–12.87)
RBC # BLD: 5.12 M/UL — SIGNIFICANT CHANGE UP (ref 4.2–5.4)
RBC # FLD: 13.3 % — SIGNIFICANT CHANGE UP (ref 11.5–14.5)
SODIUM SERPL-SCNC: 137 MMOL/L — SIGNIFICANT CHANGE UP (ref 135–146)
WBC # BLD: 13.67 K/UL — HIGH (ref 4.8–10.8)
WBC # FLD AUTO: 13.67 K/UL — HIGH (ref 4.8–10.8)

## 2023-02-15 PROCEDURE — 99232 SBSQ HOSP IP/OBS MODERATE 35: CPT

## 2023-02-15 RX ADMIN — GABAPENTIN 800 MILLIGRAM(S): 400 CAPSULE ORAL at 21:53

## 2023-02-15 RX ADMIN — GABAPENTIN 800 MILLIGRAM(S): 400 CAPSULE ORAL at 06:07

## 2023-02-15 RX ADMIN — NORTRIPTYLINE HYDROCHLORIDE 25 MILLIGRAM(S): 10 CAPSULE ORAL at 21:54

## 2023-02-15 RX ADMIN — Medication 1 TABLET(S): at 06:07

## 2023-02-15 RX ADMIN — MONTELUKAST 10 MILLIGRAM(S): 4 TABLET, CHEWABLE ORAL at 12:10

## 2023-02-15 RX ADMIN — CITALOPRAM 40 MILLIGRAM(S): 10 TABLET, FILM COATED ORAL at 12:10

## 2023-02-15 NOTE — PROGRESS NOTE ADULT - ASSESSMENT
38y/o female with a pmhx of cervical disc disease., dm  here for eval of neck pain with radiation down both arms. Pt sts pain began getting worse in left arm which prompted visit to see Dr. Mejia today and subsequently was sent to ED for admission. Pt has scheduled laminoforamenotomy of c5 c6 on 2/15. Medicine consulted for comanagement/medical clearance.     #Cervical spine disease, neck pain radiating to both arms  -MR c-spine  -pain mgmt recommended  -c/w home pain meds   -rest of plan per neurosx    #Pre-DM  - A1c 5.5   -only on Metformin 500mg po qd at home; will hold while here   -Pt prefers not to have fingerstick monitoring    #Hyperkalemia - resolved  -hemolyzed  -repeat  4.1     #Obesity  -BMI 33.5  -counselled on diet and exercise for weight loss  -outpatient dietitian eval recommended    #SPENSER opacity vs overlying ribs  -can do o/p CT scan chest     #Leukocytosis  -no signs of infection, afebrile  -trend  -if spikes fever send sepsis workup (blood cultures, UA, UCx, CXR, abx)     #DVT PPx: Heparin 5000u sq q12h - pt refusing      Planned Procedure C5-C6 laminoforamenectomy

## 2023-02-15 NOTE — CONSULT NOTE ADULT - PROBLEM SELECTOR RECOMMENDATION 9
No history of chronic opioid therapy. Moderate risk of opioid abuse per ORT.  1) Anesthetic plan - would recommend intubation with video laryngoscope w/ in line stabilization  2) Start oxycodone IR 5-10mg Q4h prn; stop percocet  3) Start hydromorphone IV 0.5mg Q6h prn postoperatively  4) Change acetaminophen to 1000mg Q8h standing  5) Continue gabapentin 800mg TID  6) Start cyclobenzaprine 10mg Q8h standing  7) Stop methocarbamol; patient states that cyclobenzaprine has worked better for her in the past  8) Continue nortriptyline 25mg qhs   9) Continue senna, start miralax

## 2023-02-15 NOTE — PROGRESS NOTE ADULT - SUBJECTIVE AND OBJECTIVE BOX
NIKKO CLARK  37y, Female  Allergy: iodine (Other (Severe))  sulfa drugs (Anaphylaxis)    Hospital Day: 2d    Patient seen and examined earlier today.  No acute events overnight.  reports pain in the back.     PMH/PSH:  PAST MEDICAL & SURGICAL HISTORY:  Anxiety and depression      H/O migraine      History of posttraumatic stress disorder (PTSD)      Cervical spondylosis with radiculopathy      Asthma, intermittent      DM (diabetes mellitus)      History of cholecystectomy      H/O knee surgery      H/O rotator cuff surgery          LAST 24-Hr EVENTS:    VITALS:  T(F): 96.9 (02-15-23 @ 12:13), Max: 98.8 (02-14-23 @ 16:30)  HR: 85 (02-15-23 @ 12:13)  BP: 127/77 (02-15-23 @ 12:13) (113/71 - 127/81)  RR: 18 (02-15-23 @ 12:13)  SpO2: 94% (02-15-23 @ 12:13)          TESTS & MEASUREMENTS:  Weight/BMI  94.1 (02-14-23 @ 10:34)  33.5 (02-14-23 @ 10:34)                          13.3   11.61 )-----------( 277      ( 14 Feb 2023 11:43 )             41.2       INR: 1.03 ratio (02-13-23 @ 12:10)    02-14    135  |  101  |  13  ----------------------------<  105<H>  4.1   |  27  |  0.6<L>    Ca    9.1      14 Feb 2023 11:43                        COVID-19 PCR: NotDetec (02-13-23 @ 12:10)        A1C with Estimated Average Glucose Result: 5.5 % (02-15-23 @ 04:18)  A1C with Estimated Average Glucose Result: 5.8 % (01-31-23 @ 14:35)          RADIOLOGY, ECG, & ADDITIONAL TESTS:  12 Lead ECG:   Ventricular Rate 95 BPM    Atrial Rate 95 BPM    P-R Interval 144 ms    QRS Duration 88 ms    Q-T Interval 372 ms    QTC Calculation(Bazett) 467 ms    P Axis 54 degrees    R Axis 21 degrees    T Axis 30 degrees    Diagnosis Line Normal sinus rhythm  Nonspecific T wave abnormality  Abnormal ECG    Confirmed by Collette Alvarado MD ([id number]) on 2/13/2023 6:39:29 PM (02-13-23 @ 12:22)      RECENT DIAGNOSTIC ORDERS:      MEDICATIONS:  MEDICATIONS  (STANDING):  citalopram 40 milliGRAM(s) Oral daily  dextrose 5%. 1000 milliLiter(s) (50 mL/Hr) IV Continuous <Continuous>  dextrose 5%. 1000 milliLiter(s) (100 mL/Hr) IV Continuous <Continuous>  dextrose 50% Injectable 25 Gram(s) IV Push once  dextrose 50% Injectable 12.5 Gram(s) IV Push once  dextrose 50% Injectable 25 Gram(s) IV Push once  gabapentin 800 milliGRAM(s) Oral three times a day  glucagon  Injectable 1 milliGRAM(s) IntraMuscular once  heparin   Injectable 5000 Unit(s) SubCutaneous every 12 hours  insulin lispro (ADMELOG) corrective regimen sliding scale   SubCutaneous Before meals and at bedtime  montelukast 10 milliGRAM(s) Oral daily  nortriptyline 25 milliGRAM(s) Oral daily  senna 2 Tablet(s) Oral at bedtime    MEDICATIONS  (PRN):  acetaminophen     Tablet .. 650 milliGRAM(s) Oral every 6 hours PRN Temp greater or equal to 38.5C (101.3F), Mild Pain (1 - 3)  albuterol    90 MICROgram(s) HFA Inhaler 2 Puff(s) Inhalation every 6 hours PRN Wheezing  dextrose Oral Gel 15 Gram(s) Oral once PRN Blood Glucose LESS THAN 70 milliGRAM(s)/deciliter  methocarbamol 500 milliGRAM(s) Oral every 8 hours PRN Muscle Spasm  oxycodone    5 mG/acetaminophen 325 mG 1 Tablet(s) Oral every 4 hours PRN Moderate Pain (4 - 6)  oxycodone    5 mG/acetaminophen 325 mG 2 Tablet(s) Oral every 6 hours PRN Severe Pain (7 - 10)      HOME MEDICATIONS:  citalopram 40 mg oral tablet (01-31)  EPINEPHrine 0.1 mg injectable kit (01-31)  fluticasone 50 mcg/inh nasal spray (01-31)  gabapentin 800 mg oral tablet (01-31)  metFORMIN 500 mg oral tablet (01-31)  montelukast 10 mg oral tablet (01-31)  nortriptyline 25 mg oral capsule (01-31)  tiZANidine 4 mg oral tablet (01-31)  Ventolin HFA 90 mcg/inh inhalation aerosol (01-31)      PHYSICAL EXAM:  GENERAL: NAD, lying in bed comfortably  HEAD:  Atraumatic, Normocephalic  EYES: conjunctiva and sclera clear  ENT: Moist mucous membranes  CHEST/LUNG: No dyspnea, non-labored breathing. No use of accessory muscles.   HEART: Regular   ABDOMEN: Nondistended  EXTREMITIES: No cyanosis, or edema  NERVOUS SYSTEM:  Alert & Oriented X3, speech clear. No deficits

## 2023-02-15 NOTE — CONSULT NOTE ADULT - ASSESSMENT
Patient is a 38 y/o woman with history of pre-diabetes, cervical DDD, asthma, ADHD, PTSD, depression, and anxiety who was admitted on 2/13/2023 with severe neck pain.

## 2023-02-15 NOTE — PROGRESS NOTE ADULT - SUBJECTIVE AND OBJECTIVE BOX
Subjective: 37yFemale with a pmhx of Disorder of intervertebral disc of cervical spine    M47.22,84145    Abnormal weight gain    Acute sinusitis, unspecified    Allergic rhinitis, unspecified    Allergy, unspecified, initial encounter    Anaphylactic reaction due to tree nuts and seeds, initial encounter    Anxiety disorder, unspecified    Body mass index [BMI] 26.0-26.9, adult    Body mass index [BMI] 28.0-28.9, adult    Body mass index [BMI] 29.0-29.9, adult    Body mass index [BMI] 30.0-30.9, adult    Body mass index [BMI] 31.0-31.9, adult    Body mass index [BMI] 32.0-32.9, adult    Carpal tunnel syndrome, unspecified upper limb    Cervical disc disorder, unspecified, unspecified cervical region    Cervicalgia    Chronic rhinitis    Chronic sinusitis, unspecified    Contusion of left elbow, initial encounter    Elevated blood-pressure reading, without diagnosis of hypertension    Encounter for administrative examinations, unspecified    Encounter for general adult medical examination without abnormal findings    Encounter for other preprocedural examination    Encounter for screening for depression    Encounter for screening for other disorder    Essential (primary) hypertension    Headache, unspecified    Hypothyroidism, unspecified    Injury of right foot, initial encounter    Insomnia, unspecified    Low back pain, unspecified    Mild intermittent asthma, uncomplicated    Mixed hyperlipidemia    Nontoxic single thyroid nodule    Obesity, unspecified    Other bursitis, not elsewhere classified, unspecified site    Other chronic allergic conjunctivitis    Other seasonal allergic rhinitis    Other specified depressive episodes    Other spondylosis with radiculopathy, cervical region    Otitis media, unspecified, unspecified ear    Overweight    Pain in left knee    Pain in right knee    Pain in right shoulder    Personal history of other diseases of the nervous system and sense organs    Personal history of other mental and behavioral disorders    Post-traumatic stress disorder, unspecified    Rash and other nonspecific skin eruption    Spinal stenosis, cervical region    Type 2 diabetes mellitus with hyperglycemia    Type 2 diabetes mellitus without complications    Unspecified abnormal findings in urine    Unspecified temporomandibular joint disorder, unspecified side    Vitamin D deficiency, unspecified    Sex Assigned At Birth    History of Tobacco Use    Alcohol history    Has the patient ever used illegal drugs?    Number of children    FH: lung cancer (Father)    Handoff    MEWS Score    Anxiety and depression    H/O migraine    History of posttraumatic stress disorder (PTSD)    Cervical spondylosis with radiculopathy    Asthma, intermittent    DM (diabetes mellitus)    Disc disorder of cervical region    Cervical radiculopathy    History of cholecystectomy    H/O knee surgery    H/O rotator cuff surgery    NECK ARM PAIN    90+    [email or web address]        HD#3  Cervical stenosis    Pt seen and examined at bedside with Dr Hester.  Pt c/o heaviness, numbness and pain to R upper extremity.  Sts it starts in neck and radiates to right shoulder, scapula and down her arm to her fingertips.  Describes it as burning pain like her arm is on fire.   Pt now sts the same sxs to left upper extremity but only radiates to elbow.     Allergies    iodine (Other (Severe))  sulfa drugs (Anaphylaxis)    Intolerances        Imaging: MRI done, not read    Vital Signs Last 24 Hrs  T(C): 36.1 (15 Feb 2023 12:13), Max: 37.1 (14 Feb 2023 16:30)  T(F): 96.9 (15 Feb 2023 12:13), Max: 98.8 (14 Feb 2023 16:30)  HR: 85 (15 Feb 2023 12:13) (84 - 89)  BP: 127/77 (15 Feb 2023 12:13) (113/71 - 127/81)  BP(mean): --  RR: 18 (15 Feb 2023 12:13) (18 - 18)  SpO2: 94% (15 Feb 2023 12:13) (94% - 98%)      acetaminophen     Tablet .. 650 milliGRAM(s) Oral every 6 hours PRN  albuterol    90 MICROgram(s) HFA Inhaler 2 Puff(s) Inhalation every 6 hours PRN  citalopram 40 milliGRAM(s) Oral daily  dextrose 5%. 1000 milliLiter(s) IV Continuous <Continuous>  dextrose 5%. 1000 milliLiter(s) IV Continuous <Continuous>  dextrose 50% Injectable 25 Gram(s) IV Push once  dextrose 50% Injectable 12.5 Gram(s) IV Push once  dextrose 50% Injectable 25 Gram(s) IV Push once  dextrose Oral Gel 15 Gram(s) Oral once PRN  gabapentin 800 milliGRAM(s) Oral three times a day  glucagon  Injectable 1 milliGRAM(s) IntraMuscular once  heparin   Injectable 5000 Unit(s) SubCutaneous every 12 hours  insulin lispro (ADMELOG) corrective regimen sliding scale   SubCutaneous Before meals and at bedtime  methocarbamol 500 milliGRAM(s) Oral every 8 hours PRN  montelukast 10 milliGRAM(s) Oral daily  nortriptyline 25 milliGRAM(s) Oral daily  oxycodone    5 mG/acetaminophen 325 mG 1 Tablet(s) Oral every 4 hours PRN  oxycodone    5 mG/acetaminophen 325 mG 2 Tablet(s) Oral every 6 hours PRN  senna 2 Tablet(s) Oral at bedtime          REVIEW OF SYSTEMS    [x ] A ten-point review of systems was otherwise negative except as noted.  [ ] Due to altered mental status/intubation, subjective information were not able to be obtained from the patient. History was obtained, to the extent possible, from review of the chart and collateral sources of information.      Neuro Exam:  AAOX3. Verbal function intact  follows commands  Motor: MAEx4  pain limited exam  3+/4-/5 power in RUE  R hand  4+/5  5-/5 power in LUE  L hand  5/5  No Hoffmans  5/5 power b/l LE  Sensation: intact L>R  no TTP to neck      CBC Full  -  ( 14 Feb 2023 11:43 )  WBC Count : 11.61 K/uL  RBC Count : 4.74 M/uL  Hemoglobin : 13.3 g/dL  Hematocrit : 41.2 %  Platelet Count - Automated : 277 K/uL  Mean Cell Volume : 86.9 fL  Mean Cell Hemoglobin : 28.1 pg  Mean Cell Hemoglobin Concentration : 32.3 g/dL  Auto Neutrophil # : x  Auto Lymphocyte # : x  Auto Monocyte # : x  Auto Eosinophil # : x  Auto Basophil # : x  Auto Neutrophil % : x  Auto Lymphocyte % : x  Auto Monocyte % : x  Auto Eosinophil % : x  Auto Basophil % : x    02-14    135  |  101  |  13  ----------------------------<  105<H>  4.1   |  27  |  0.6<L>    Ca    9.1      14 Feb 2023 11:43          I&O's Detail    I&O's Summary        Assessment/Plan:   Will review imaging w attg for possible operative plan  Await final read of MRI  d/w attg

## 2023-02-15 NOTE — CONSULT NOTE ADULT - SUBJECTIVE AND OBJECTIVE BOX
NIKKO CLARK  [facility] [address] ([facility]      Patient is a 37y old  Female who presents with a chief complaint of     HPI:  36y/o female with a pmhx of cervical disc disease., DM,  here for eval of neck pain with radiation down both arms. Pt sts pain began getting worse in left arm which prompted visit to see Dr. Mejia today and subsequently was sent to ED for admission. Pt has scheduled laminoforamenotomy of C5 C6 on 2/15.   Pt complaints of right arm pain since 2019 and progressively worsening and for the last 3 weeks, she also developing left arm pain with numbness and tingling, denied any B/B dysfunction. No difficulty walking. (13 Feb 2023 14:33)      Interval events:  Patient seen and examined at bedside. She says she has had chronic right sided neck pain, had multiple injections over time, never got better, then recently started radiating down left arm which made her see Dr. Hester. She has a history of pre-DM, has been on/off Metformin 500mg qd, currently on. She does not get short of breath when walking up stairs or walking a few blocks, but feels heaviness in her neck, shoulders, and arms due to her pain. Non smoker, no alcohol or drugs. No weight changes lately.    -PMHx: Anxiety and depression    H/O migraine    History of posttraumatic stress disorder (PTSD)    Cervical spondylosis with radiculopathy    Asthma, intermittent    DM (diabetes mellitus)      -PSHx:History of cholecystectomy    H/O knee surgery    H/O rotator cuff surgery    Social Hx: Non smoker, no alcohol or drugs. Works at [employer].    Family Hx: Father passed away at age 57 from lung cancer. Mom has diabetes and hypertension.         REVIEW OF SYSTEMS:  CONSTITUTIONAL: No fever, weight loss, or fatigue  RESPIRATORY: No cough, wheezing, chills or hemoptysis; No shortness of breath  CARDIOVASCULAR: No chest pain, palpitations, dizziness, or leg swelling  GASTROINTESTINAL: No abdominal or epigastric pain. No nausea, vomiting, or hematemesis; No diarrhea or constipation. No melena or hematochezia.  NEUROLOGICAL: No headaches  LYMPH NODES: No enlarged glands  MUSCULOSKELETAL: as above       T(C): , Max: 36.1 (02-14-23 @ 00:59)  HR: 88 (02-14-23 @ 04:53)  BP: 113/73 (02-14-23 @ 04:53)  RR: 18 (02-14-23 @ 04:53)  SpO2: 96% (02-14-23 @ 04:53)  CAPILLARY BLOOD GLUCOSE      POCT Blood Glucose.: 89 mg/dL (14 Feb 2023 07:43)  POCT Blood Glucose.: 104 mg/dL (13 Feb 2023 21:35)  POCT Blood Glucose.: 81 mg/dL (13 Feb 2023 16:24)      PHYSICAL EXAM:  GENERAL: NAD, lying in bed comfortably  HEAD:  Atraumatic, Normocephalic  EYES: EOMI, PERRLA, conjunctiva and sclera clear  ENT: Moist mucous membranes  NECK: Supple, No JVD  CHEST/LUNG: Clear to auscultation bilaterally; No rales, rhonchi, wheezing, or rubs. Unlabored respirations  HEART: Regular rate and rhythm; No murmurs, rubs, or gallops  ABDOMEN: Bowel sounds present; Soft, Nontender, Nondistended. No hepatomegaly  EXTREMITIES:  2+ Peripheral Pulses, brisk capillary refill. No clubbing, cyanosis, or edema  SKIN: No rashes or lesions  NEURO: AAOx3.   Tongue midline  Facial features symmetric, No droop  Speech clear and appropriate, no slur   Pt speaking in full sentences   Follows all commands   Occular :   PERRLA, EOMI   Motor :   MAEx4   No spinal point tenderness   Sensory :  Intact bilaterally      Care Discussed with Consultants/Other Providers [ x] YES  [ ] NO    LABS:        PT/INR - ( 13 Feb 2023 12:10 )   PT: 11.70 sec;   INR: 1.03 ratio         PTT - ( 13 Feb 2023 12:10 )  PTT:36.1 sec      Microbiology:      RADIOLOGY & ADDITIONAL TESTS:  < from: Xray Chest 1 View- PORTABLE-Urgent (Xray Chest 1 View- PORTABLE-Urgent .) (02.13.23 @ 13:29) >  Impression:    Focal opacity left upper lobe versus overlapping ribs. Correlate with CAT   scan of thorax.  Lungs otherwise clear.    < end of copied text >        Medications:  acetaminophen     Tablet .. 650 milliGRAM(s) Oral every 6 hours PRN  albuterol    90 MICROgram(s) HFA Inhaler 2 Puff(s) Inhalation every 6 hours PRN  citalopram 40 milliGRAM(s) Oral daily  dextrose 5%. 1000 milliLiter(s) IV Continuous <Continuous>  dextrose 5%. 1000 milliLiter(s) IV Continuous <Continuous>  dextrose 50% Injectable 25 Gram(s) IV Push once  dextrose 50% Injectable 12.5 Gram(s) IV Push once  dextrose 50% Injectable 25 Gram(s) IV Push once  dextrose Oral Gel 15 Gram(s) Oral once PRN  gabapentin 800 milliGRAM(s) Oral three times a day  glucagon  Injectable 1 milliGRAM(s) IntraMuscular once  heparin   Injectable 5000 Unit(s) SubCutaneous every 12 hours  insulin lispro (ADMELOG) corrective regimen sliding scale   SubCutaneous Before meals and at bedtime  metFORMIN 500 milliGRAM(s) Oral daily  methocarbamol 500 milliGRAM(s) Oral every 8 hours PRN  montelukast 10 milliGRAM(s) Oral daily  nortriptyline 25 milliGRAM(s) Oral daily  oxycodone    5 mG/acetaminophen 325 mG 1 Tablet(s) Oral every 4 hours PRN  oxycodone    5 mG/acetaminophen 325 mG 2 Tablet(s) Oral every 6 hours PRN  senna 2 Tablet(s) Oral at bedtime      
Pain Medicine Consult Note    History of Present Illness  Patient is a 36 y/o woman with history of pre-diabetes, cervical DDD, asthma, ADHD, PTSD, depression, and anxiety who was admitted on 2/13/2023 with severe neck pain. The patient states that she first developed pain across the posterior neck and upper back when lifting a heavy object at work on 12/14/2019. Since that time, she has undergone multiple cervical epidural steroid injections, trigger point injections, and a right suprascapular block. None of these procedures helped with her pain. The patient states that she was seen by an orthopedic surgeon and underwent a right shoulder rotator cuff repair which also did not help with her pain. She has not been on opioid therapy, but has been prescribed a variety of non opioid medications. Currently, she is taking gabapentin 800mg TID, nortriptyline 25mg qhs, and tizanidine prn. At this point, the patient endorse shaving an aching, pulling, burning pain over the right posterior neck that radiates to the suprascapular region, anterior and and dorsal forearm into the right hand. She notes numbness and tingling over this distribution and describes diffuse heaviness throughout the right upper extremity. No bowel or bladder incontinence, saddle anesthesia, or ataxia.     Current Inpatient Medication Regimen:  acetaminophen     Tablet .. 650 milliGRAM(s) Oral every 6 hours PRN  albuterol    90 MICROgram(s) HFA Inhaler 2 Puff(s) Inhalation every 6 hours PRN  citalopram 40 milliGRAM(s) Oral daily  dextrose 5%. 1000 milliLiter(s) IV Continuous <Continuous>  dextrose 5%. 1000 milliLiter(s) IV Continuous <Continuous>  dextrose 50% Injectable 25 Gram(s) IV Push once  dextrose 50% Injectable 12.5 Gram(s) IV Push once  dextrose 50% Injectable 25 Gram(s) IV Push once  dextrose Oral Gel 15 Gram(s) Oral once PRN  gabapentin 800 milliGRAM(s) Oral three times a day  glucagon  Injectable 1 milliGRAM(s) IntraMuscular once  heparin   Injectable 5000 Unit(s) SubCutaneous every 12 hours  insulin lispro (ADMELOG) corrective regimen sliding scale   SubCutaneous Before meals and at bedtime  methocarbamol 500 milliGRAM(s) Oral every 8 hours PRN  montelukast 10 milliGRAM(s) Oral daily  nortriptyline 25 milliGRAM(s) Oral daily  oxycodone    5 mG/acetaminophen 325 mG 1 Tablet(s) Oral every 4 hours PRN  oxycodone    5 mG/acetaminophen 325 mG 2 Tablet(s) Oral every 6 hours PRN  senna 2 Tablet(s) Oral at bedtime      Home Analgesic Regimen:  gabapentin 800mg TID  nortriptyline 25mg qhs  tizanidine prn    Allergies:  iodine (Other (Severe))  sulfa drugs (Anaphylaxis)      Past Medical History:  Prediabetes  Depression  Anxiety  ADHD  PTSD  Asthma  Cervical radiculopathy    Past Surgical History:  Right rotator cuff repair  Cholecystectomy  Left knee arthroscopy    Family History:  DM (mother)  Lung cancer (father)    Social History:  Tobacco - denies  EtOH - denies  Drugs - denies      Review of Systems:  General: no fevers or chills  Eyes: no diplopia or blurred vision  ENT: no rhinorrhea  CV: no chest pain  Resp: no cough or dyspnea  GI: no abdominal pain, constipation, or diarrhea  : no urinary incontinence or dysuria  Neuro: +RUE numbness and weakness  Psych: +depression and anxiety    Physical Exam:  T(C): 36.1 (02-15-23 @ 12:13), Max: 37.1 (02-14-23 @ 16:30)  HR: 85 (02-15-23 @ 12:13) (84 - 89)  BP: 127/77 (02-15-23 @ 12:13) (113/71 - 127/81)  RR: 18 (02-15-23 @ 12:13) (18 - 18)  SpO2: 94% (02-15-23 @ 12:13) (94% - 98%)  Gen: Patient in some discomfort  Neck: patient with significantly limited ROM with rotation bilaterally and extension, largely due to fear of pain   Eyes: no scleral icterus  Head: Normocephalic / Atraumatic  CV: no JVD  Lungs: nonlabored breathing  Abdomen: nondistended, soft  : no justin catheter in place  Neuro: AOx3, Cranial nerves intact; unable to fully assess strength in RUE due to pain with movement   Extremities: +allodynia over the right anterior arm and dorsal forearm  Psych: normal affect      Labs:  CBC  11.61 > 13.3 / 41.2 < 277    BMP  135  |  101  |  13  4.1   |  27    |  0.6  Glucose 105    Imaging Studies:  CXR (2/13/2023)  Findings:    Support devices: None.    Cardiac/mediastinum/hilum: Unremarkable.    Lung parenchyma/Pleura: Focal opacity left upper lobe versus overlying   ribs    Skeleton/soft tissues: Within normal limits    Impression:    Focal opacity left upper lobe versus overlapping ribs. Correlate with CAT   scan of thorax.  Lungs otherwise clear.      Opioid Risk Assessment Tool                                                                        Female       Male  Family History  Alcohol                                                              1                3  Illegal drugs                                                       2                3  Rx drugs                                                            4                4    Personal History   Alcohol                                                              3                3  Illegal drugs                                                       4                4  Rx drugs                                                            5                5    Age between 16—45 years                                1                1  History of preadolescent sexual abuse               3                0    Psychological disease  ADD, OCD, bipolar, schizophrenia                      2                2  Depression                                                       1                1    Total Score                                                      4              __    0 - 3 = low risk for future opioid abuse  4 - 7 = moderate risk for future opioid abuse  8+ = high risk for future opioid abuse

## 2023-02-16 LAB
BLD GP AB SCN SERPL QL: SIGNIFICANT CHANGE UP
HCG UR QL: NEGATIVE — SIGNIFICANT CHANGE UP

## 2023-02-16 RX ADMIN — NORTRIPTYLINE HYDROCHLORIDE 25 MILLIGRAM(S): 10 CAPSULE ORAL at 22:09

## 2023-02-16 RX ADMIN — Medication 650 MILLIGRAM(S): at 22:43

## 2023-02-16 RX ADMIN — Medication 650 MILLIGRAM(S): at 22:13

## 2023-02-16 RX ADMIN — GABAPENTIN 800 MILLIGRAM(S): 400 CAPSULE ORAL at 06:25

## 2023-02-16 RX ADMIN — GABAPENTIN 800 MILLIGRAM(S): 400 CAPSULE ORAL at 22:09

## 2023-02-16 NOTE — PROGRESS NOTE ADULT - SUBJECTIVE AND OBJECTIVE BOX
HPI: 36y/o female with a pmhx of cervical disc disease., DM,  here for eval of neck pain with radiation down both arms. Pt sts pain began getting worse in left arm which prompted visit to see Dr. Mejia today and subsequently was sent to ED for admission. Pt has scheduled laminoforamenotomy of C5 C6 on 2/15.   Pt complaints of right arm pain since 2019 and progressively worsening and for the last 3 weeks, she also developing left arm pain with numbness and tingling, denied any B/B dysfunction. No difficulty walking. (13 Feb 2023 14:33)    PAST MEDICAL & SURGICAL HISTORY:  Anxiety and depression      H/O migraine      History of posttraumatic stress disorder (PTSD)      Cervical spondylosis with radiculopathy      Asthma, intermittent      DM (diabetes mellitus)    History of cholecystectomy    H/O knee surgery    H/O rotator cuff surgery    HEALTH ISSUES - PROBLEM Dx:  Cervical radiculopathy    FAMILY HISTORY:  FH: lung cancer (Father)    Allergies    iodine (Other (Severe))  sulfa drugs (Anaphylaxis)    Intolerances    REVIEW OF SYSTEMS : As listed in HPI  Head and Neck:   Cardio :   Pum :  GI:  Neuro:     MEDICATIONS  (STANDING):  citalopram 40 milliGRAM(s) Oral daily  dextrose 5%. 1000 milliLiter(s) (100 mL/Hr) IV Continuous <Continuous>  dextrose 5%. 1000 milliLiter(s) (50 mL/Hr) IV Continuous <Continuous>  dextrose 50% Injectable 25 Gram(s) IV Push once  dextrose 50% Injectable 12.5 Gram(s) IV Push once  dextrose 50% Injectable 25 Gram(s) IV Push once  gabapentin 800 milliGRAM(s) Oral three times a day  glucagon  Injectable 1 milliGRAM(s) IntraMuscular once  heparin   Injectable 5000 Unit(s) SubCutaneous every 12 hours  insulin lispro (ADMELOG) corrective regimen sliding scale   SubCutaneous Before meals and at bedtime  montelukast 10 milliGRAM(s) Oral daily  nortriptyline 25 milliGRAM(s) Oral daily  senna 2 Tablet(s) Oral at bedtime    MEDICATIONS  (PRN):  acetaminophen     Tablet .. 650 milliGRAM(s) Oral every 6 hours PRN Temp greater or equal to 38.5C (101.3F), Mild Pain (1 - 3)  albuterol    90 MICROgram(s) HFA Inhaler 2 Puff(s) Inhalation every 6 hours PRN Wheezing  dextrose Oral Gel 15 Gram(s) Oral once PRN Blood Glucose LESS THAN 70 milliGRAM(s)/deciliter  methocarbamol 500 milliGRAM(s) Oral every 8 hours PRN Muscle Spasm  oxycodone    5 mG/acetaminophen 325 mG 1 Tablet(s) Oral every 4 hours PRN Moderate Pain (4 - 6)  oxycodone    5 mG/acetaminophen 325 mG 2 Tablet(s) Oral every 6 hours PRN Severe Pain (7 - 10)    Anticoagulation:  heparin   Injectable 5000 Unit(s) SubCutaneous every 12 hours    Vital Signs Last 24 Hrs  T(C): 36.4 (16 Feb 2023 09:39), Max: 36.9 (15 Feb 2023 16:55)  T(F): 97.5 (16 Feb 2023 09:39), Max: 98.4 (15 Feb 2023 16:55)  HR: 92 (16 Feb 2023 09:39) (91 - 94)  BP: 126/91 (16 Feb 2023 09:39) (115/77 - 126/91)  BP(mean): --  RR: 19 (16 Feb 2023 09:39) (18 - 19)  SpO2: 99% (16 Feb 2023 09:39) (98% - 99%)    Parameters below as of 16 Feb 2023 09:39  Patient On (Oxygen Delivery Method): room air    Physical Exam :  General :   A&O x 3  Tongue midline  Facial features symmetric, No droop  Speech clear and appropriate, no slur   Pt speaking in full sentences   Follows all commands   Occular :   PERRLA, EOMI   Motor :   MAEx4   Sensory :  Intact bilaterally     LABS:                        14.2   13.67 )-----------( 301      ( 15 Feb 2023 22:46 )             44.7     02-15    137  |  99  |  17  ----------------------------<  87  4.0   |  26  |  1.1    Ca    9.3      15 Feb 2023 22:46      PT/INR - ( 15 Feb 2023 22:46 )   PT: 12.80 sec;   INR: 1.12 ratio         PTT - ( 15 Feb 2023 22:46 )  PTT:39.9 sec    RADIOLOGY & ADDITIONAL STUDIES:    Assessment / Plan:   - Case cancelled for today, will go to OR tomorrow 2.17.23 for C5-6 Laminoforaminotomy   - NPO @ midnight tonight

## 2023-02-16 NOTE — PRE-OP CHECKLIST - HEART RATE (BEATS/MIN)
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   ROS of the following were done and are negative: Constitutional, Eyes, Ears, Nose,   Mouth, Throat, Cardiovascular, Respiratory, GI, Genitourinary, Musculoskeletal,   Psychiatric, Endocrine, Allergic/Immunologic.    PHYSICAL EXAM:   Pulse 62   Wt 104.3 kg (230 lb)   LMP  (LMP Unknown)   SpO2 93%   BMI 44.92 kg/m    Skin exam performed as follows: Type 2 skin. Mood appropriate  Alert and Oriented X 3. Well developed, well nourished in no distress.  General appearance: Normal  Head including face: Normal  Eyes: conjunctiva and lids: Normal  Mouth: Lips, teeth, gums: Normal  Neck: Normal  Chest-breast/axillae: Normal  Back: Normal  Spleen and liver: Normal  Cardiovascular: Exam of peripheral vascular system by observation for swelling, varicosities, edema: Normal  Genitalia: groin, buttocks: Normal  Extremities: digits/nails (clubbing): Normal  Eccrine and Apocrine glands: Normal  Right upper extremity: Normal  Left upper extremity: Normal  Right lower extremity: Normal  Left lower extremity: Normal  Skin: Scalp and body hair: See below    Pt deferred exam of breasts, groin, buttocks: No    Other physical findings:  1. Multiple pigmented macules on extremities and trunk  2. Multiple pigmented macules on face, trunk and extremities  3. Multiple  vascular papules on trunk, arms and legs  4. Multiple scattered keratotic plaques  5. Irregular 6 mm black macule on the right upper forehead  6. Inflamed keratotic papule on the right temple x 1, nasal bridge x 1, neck x 3, left upper cheek x 1, right breast x 1, left breast x 1, back x 1       Except as noted above, no other signs of skin cancer or melanoma.     ASSESSMENT/PLAN:   Benign Full skin cancer screening today. . Patient with history of none  Advised on monthly self exams and 1 year  Patient Education: Appropriate brochures given.    1. Multiple benign appearing nevi on arms, legs and trunk. Discussed ABCDEs of melanoma and sunscreen.   2. Multiple lentigos on arms, legs and trunk. Advised benign, no treatment needed.  3. Multiple scattered angiomas. Advised benign, no treatment needed.   4. Seborrheic keratosis on arms, legs and trunk. Advised benign, no treatment needed.  5. Lentigo r/o lentigo maligna on the right upper forehead. Shave biopsy performed.  Area cleaned and anesthetized with 1% lidocaine with epinephrine.  Dermablade used to remove the lesion and sent to pathology. Bleeding was cauterized. Pt tolerated procedure well with no complications.   6. Inflamed seborrheic keratosis on the right temple x 1, nasal bridge x 1, neck x 3, left upper cheek x 1, right breast x 1, left breast x 1, back x 1. As physically tender cryosurgery performed. Advised on post op care.             Follow-up: yearly FSE/PRN sooner    1.) Patient was asked about new and changing moles. YES  2.) Patient received a complete physical skin examination: YES  3.) Patient was counseled to perform a monthly self skin examination: YES  Scribed By: Denice Maurer, MS, PA-C       95

## 2023-02-17 LAB
GLUCOSE BLDC GLUCOMTR-MCNC: 109 MG/DL — HIGH (ref 70–99)
GLUCOSE BLDC GLUCOMTR-MCNC: 83 MG/DL — SIGNIFICANT CHANGE UP (ref 70–99)

## 2023-02-17 PROCEDURE — 63045 LAM FACETEC & FORAMOT CRV: CPT

## 2023-02-17 PROCEDURE — 63001 REMOVE SPINE LAMINA 1/2 CRVL: CPT | Mod: AS

## 2023-02-17 RX ORDER — SODIUM CHLORIDE 9 MG/ML
1000 INJECTION, SOLUTION INTRAVENOUS
Refills: 0 | Status: DISCONTINUED | OUTPATIENT
Start: 2023-02-17 | End: 2023-02-17

## 2023-02-17 RX ORDER — MONTELUKAST 4 MG/1
10 TABLET, CHEWABLE ORAL DAILY
Refills: 0 | Status: DISCONTINUED | OUTPATIENT
Start: 2023-02-18 | End: 2023-02-18

## 2023-02-17 RX ORDER — MEPERIDINE HYDROCHLORIDE 50 MG/ML
12.5 INJECTION INTRAMUSCULAR; INTRAVENOUS; SUBCUTANEOUS
Refills: 0 | Status: DISCONTINUED | OUTPATIENT
Start: 2023-02-17 | End: 2023-02-17

## 2023-02-17 RX ORDER — DEXTROSE 50 % IN WATER 50 %
12.5 SYRINGE (ML) INTRAVENOUS ONCE
Refills: 0 | Status: DISCONTINUED | OUTPATIENT
Start: 2023-02-17 | End: 2023-02-18

## 2023-02-17 RX ORDER — CEFAZOLIN SODIUM 1 G
3000 VIAL (EA) INJECTION EVERY 8 HOURS
Refills: 0 | Status: COMPLETED | OUTPATIENT
Start: 2023-02-17 | End: 2023-02-17

## 2023-02-17 RX ORDER — INSULIN LISPRO 100/ML
VIAL (ML) SUBCUTANEOUS AT BEDTIME
Refills: 0 | Status: DISCONTINUED | OUTPATIENT
Start: 2023-02-17 | End: 2023-02-18

## 2023-02-17 RX ORDER — NORTRIPTYLINE HYDROCHLORIDE 10 MG/1
25 CAPSULE ORAL DAILY
Refills: 0 | Status: DISCONTINUED | OUTPATIENT
Start: 2023-02-18 | End: 2023-02-18

## 2023-02-17 RX ORDER — HYDROMORPHONE HYDROCHLORIDE 2 MG/ML
1 INJECTION INTRAMUSCULAR; INTRAVENOUS; SUBCUTANEOUS
Refills: 0 | Status: DISCONTINUED | OUTPATIENT
Start: 2023-02-17 | End: 2023-02-17

## 2023-02-17 RX ORDER — CITALOPRAM 10 MG/1
10 TABLET, FILM COATED ORAL DAILY
Refills: 0 | Status: DISCONTINUED | OUTPATIENT
Start: 2023-02-18 | End: 2023-02-18

## 2023-02-17 RX ORDER — SODIUM CHLORIDE 9 MG/ML
1000 INJECTION, SOLUTION INTRAVENOUS
Refills: 0 | Status: DISCONTINUED | OUTPATIENT
Start: 2023-02-17 | End: 2023-02-18

## 2023-02-17 RX ORDER — DEXTROSE 50 % IN WATER 50 %
25 SYRINGE (ML) INTRAVENOUS ONCE
Refills: 0 | Status: DISCONTINUED | OUTPATIENT
Start: 2023-02-17 | End: 2023-02-18

## 2023-02-17 RX ORDER — ONDANSETRON 8 MG/1
4 TABLET, FILM COATED ORAL ONCE
Refills: 0 | Status: DISCONTINUED | OUTPATIENT
Start: 2023-02-17 | End: 2023-02-17

## 2023-02-17 RX ORDER — GABAPENTIN 400 MG/1
800 CAPSULE ORAL EVERY 8 HOURS
Refills: 0 | Status: DISCONTINUED | OUTPATIENT
Start: 2023-02-18 | End: 2023-02-18

## 2023-02-17 RX ORDER — ACETAMINOPHEN 500 MG
975 TABLET ORAL EVERY 8 HOURS
Refills: 0 | Status: DISCONTINUED | OUTPATIENT
Start: 2023-02-17 | End: 2023-02-18

## 2023-02-17 RX ORDER — DEXTROSE 50 % IN WATER 50 %
15 SYRINGE (ML) INTRAVENOUS ONCE
Refills: 0 | Status: DISCONTINUED | OUTPATIENT
Start: 2023-02-17 | End: 2023-02-18

## 2023-02-17 RX ORDER — ALBUTEROL 90 UG/1
2 AEROSOL, METERED ORAL EVERY 6 HOURS
Refills: 0 | Status: DISCONTINUED | OUTPATIENT
Start: 2023-02-17 | End: 2023-02-18

## 2023-02-17 RX ORDER — INSULIN LISPRO 100/ML
VIAL (ML) SUBCUTANEOUS
Refills: 0 | Status: DISCONTINUED | OUTPATIENT
Start: 2023-02-17 | End: 2023-02-18

## 2023-02-17 RX ORDER — HYDROMORPHONE HYDROCHLORIDE 2 MG/ML
0.5 INJECTION INTRAMUSCULAR; INTRAVENOUS; SUBCUTANEOUS
Refills: 0 | Status: DISCONTINUED | OUTPATIENT
Start: 2023-02-17 | End: 2023-02-17

## 2023-02-17 RX ORDER — GLUCAGON INJECTION, SOLUTION 0.5 MG/.1ML
1 INJECTION, SOLUTION SUBCUTANEOUS ONCE
Refills: 0 | Status: DISCONTINUED | OUTPATIENT
Start: 2023-02-17 | End: 2023-02-18

## 2023-02-17 RX ORDER — CYCLOBENZAPRINE HYDROCHLORIDE 10 MG/1
5 TABLET, FILM COATED ORAL THREE TIMES A DAY
Refills: 0 | Status: DISCONTINUED | OUTPATIENT
Start: 2023-02-17 | End: 2023-02-18

## 2023-02-17 RX ORDER — CHLORHEXIDINE GLUCONATE 213 G/1000ML
1 SOLUTION TOPICAL DAILY
Refills: 0 | Status: DISCONTINUED | OUTPATIENT
Start: 2023-02-17 | End: 2023-02-17

## 2023-02-17 RX ORDER — OXYCODONE HYDROCHLORIDE 5 MG/1
5 TABLET ORAL EVERY 6 HOURS
Refills: 0 | Status: DISCONTINUED | OUTPATIENT
Start: 2023-02-17 | End: 2023-02-18

## 2023-02-17 RX ADMIN — OXYCODONE HYDROCHLORIDE 5 MILLIGRAM(S): 5 TABLET ORAL at 22:02

## 2023-02-17 RX ADMIN — MONTELUKAST 10 MILLIGRAM(S): 4 TABLET, CHEWABLE ORAL at 11:56

## 2023-02-17 RX ADMIN — GABAPENTIN 800 MILLIGRAM(S): 400 CAPSULE ORAL at 13:51

## 2023-02-17 RX ADMIN — Medication 975 MILLIGRAM(S): at 22:03

## 2023-02-17 RX ADMIN — Medication 975 MILLIGRAM(S): at 21:33

## 2023-02-17 RX ADMIN — CYCLOBENZAPRINE HYDROCHLORIDE 5 MILLIGRAM(S): 10 TABLET, FILM COATED ORAL at 21:40

## 2023-02-17 RX ADMIN — CITALOPRAM 40 MILLIGRAM(S): 10 TABLET, FILM COATED ORAL at 11:56

## 2023-02-17 RX ADMIN — GABAPENTIN 800 MILLIGRAM(S): 400 CAPSULE ORAL at 06:47

## 2023-02-17 RX ADMIN — OXYCODONE HYDROCHLORIDE 5 MILLIGRAM(S): 5 TABLET ORAL at 21:32

## 2023-02-17 NOTE — PROGRESS NOTE ADULT - SUBJECTIVE AND OBJECTIVE BOX
Postop check     S/P Right C5-6 Laminectomy       pt seen and examined at bedside pt is alert , has some c/o incision al discomfort       Vital Signs Last 24 Hrs  T(C): 36.2 (17 Feb 2023 21:12), Max: 36.7 (17 Feb 2023 16:00)  T(F): 97.2 (17 Feb 2023 21:12), Max: 98.1 (17 Feb 2023 16:00)  HR: 90 (17 Feb 2023 21:12) (85 - 96)  BP: 133/82 (17 Feb 2023 21:12) (114/71 - 143/79)  BP(mean): --  RR: 18 (17 Feb 2023 21:12) (15 - 18)  SpO2: 97% (17 Feb 2023 21:12) (95% - 98%)    Parameters below as of 17 Feb 2023 21:12  Patient On (Oxygen Delivery Method): room air        PHYSICAL EXAM:    ALert, Follows commands   MAEX4 LUE> RUE   MS RUE 4/5   5-/5         LUE 5/5    5/5   no hoffmans sign       Incision clean dry intact                MEDICATIONS:  Antibiotics:    Neuro:  acetaminophen     Tablet .. 975 milliGRAM(s) Oral every 8 hours  cyclobenzaprine 5 milliGRAM(s) Oral three times a day PRN  oxyCODONE    IR 5 milliGRAM(s) Oral every 6 hours PRN    Anticoagulation:    OTHER:  albuterol    90 MICROgram(s) HFA Inhaler 2 Puff(s) Inhalation every 6 hours PRN  dextrose 50% Injectable 25 Gram(s) IV Push once  dextrose 50% Injectable 12.5 Gram(s) IV Push once  dextrose 50% Injectable 25 Gram(s) IV Push once  dextrose Oral Gel 15 Gram(s) Oral once PRN  glucagon  Injectable 1 milliGRAM(s) IntraMuscular once  insulin lispro (ADMELOG) corrective regimen sliding scale   SubCutaneous three times a day before meals  insulin lispro (ADMELOG) corrective regimen sliding scale   SubCutaneous at bedtime    IVF:  dextrose 5%. 1000 milliLiter(s) IV Continuous <Continuous>  dextrose 5%. 1000 milliLiter(s) IV Continuous <Continuous>      RADIOLOGY:      A/p             S/P C5-6 Laminectomy                   pain control                   DVT prophylaxis tomorrow                   PT/OT/rehab

## 2023-02-17 NOTE — CHART NOTE - NSCHARTNOTEFT_GEN_A_CORE
PACU ANESTHESIA PACU ADMISSION NOTE      Procedure:  Post op diagnosis    ____ Intubated  TV:______       Rate: ______      FiO2: ______    __x__ Patent Airway    ____ Full return of protective reflexes    ____ Full recovery from anesthesia / sedation to baseline status    Viitals:  see anesthesia record          Mental Status:  _x___ Awake   _____ Alert   _____ Drowsy   _____ Sedated    Nausea/Vomiting: ____ Yes, See Post - Op Orders      __x__ No    Pain Scale (0-10): _____    Treatment: ____ None    _x___ See Post - Op/PCA Orders    Post - Operative Fluids:   ____ Oral   ___x_ See Post - Op Orders    Plan:         Discharge:   ____Home       __x___Floor         _____Critical Care    _____Other:_________________    Comments: uneventful perioperative course; no s/s of anesthesia complications noted; D/C floor when criteria met

## 2023-02-17 NOTE — PRE-ANESTHESIA EVALUATION ADULT - BP NONINVASIVE SYSTOLIC (MM HG)
Follow up infectious disease specialist evaluation and clearance of bacteria in blood.   Continue with IV antibiotics therapy as instructed in outpatient setting in your home.   - ancef 2gram every 8 hours for total of 14 days.  Follow dietary modifications as instructed by nutritionist for blood glucose control.  Continue with wound care management at home as instructed by Dr. Esquivel,  - practicing good hand hygiene at all times.   
Looks like a 4 hour episode of AF/AFL? Hx of aifb post-op, off AC due to bruising and hx of GI bleeding. GI saw her 5/2020 for anemia, EGD showed gastric erosions, was OK to resume Plavix, however she is now on ASA. 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 Also follows with Dr. Sherita Carias. Should we consider for Watchman? I can have her seen in office by NP and follow up with GI to discuss [facility]?      Donell Goodell, APRN-CNP
141

## 2023-02-18 ENCOUNTER — TRANSCRIPTION ENCOUNTER (OUTPATIENT)
Age: 38
End: 2023-02-18

## 2023-02-18 VITALS
HEART RATE: 100 BPM | OXYGEN SATURATION: 100 % | RESPIRATION RATE: 18 BRPM | DIASTOLIC BLOOD PRESSURE: 80 MMHG | SYSTOLIC BLOOD PRESSURE: 123 MMHG | TEMPERATURE: 97 F

## 2023-02-18 LAB
ANION GAP SERPL CALC-SCNC: 12 MMOL/L — SIGNIFICANT CHANGE UP (ref 7–14)
BASOPHILS # BLD AUTO: 0.01 K/UL — SIGNIFICANT CHANGE UP (ref 0–0.2)
BASOPHILS NFR BLD AUTO: 0.1 % — SIGNIFICANT CHANGE UP (ref 0–1)
BUN SERPL-MCNC: 15 MG/DL — SIGNIFICANT CHANGE UP (ref 10–20)
CALCIUM SERPL-MCNC: 9.5 MG/DL — SIGNIFICANT CHANGE UP (ref 8.4–10.4)
CHLORIDE SERPL-SCNC: 100 MMOL/L — SIGNIFICANT CHANGE UP (ref 98–110)
CO2 SERPL-SCNC: 24 MMOL/L — SIGNIFICANT CHANGE UP (ref 17–32)
CREAT SERPL-MCNC: 0.7 MG/DL — SIGNIFICANT CHANGE UP (ref 0.7–1.5)
EGFR: 113 ML/MIN/1.73M2 — SIGNIFICANT CHANGE UP
EOSINOPHIL # BLD AUTO: 0 K/UL — SIGNIFICANT CHANGE UP (ref 0–0.7)
EOSINOPHIL NFR BLD AUTO: 0 % — SIGNIFICANT CHANGE UP (ref 0–8)
GLUCOSE SERPL-MCNC: 167 MG/DL — HIGH (ref 70–99)
HCT VFR BLD CALC: 42.2 % — SIGNIFICANT CHANGE UP (ref 37–47)
HGB BLD-MCNC: 13.7 G/DL — SIGNIFICANT CHANGE UP (ref 12–16)
IMM GRANULOCYTES NFR BLD AUTO: 0.5 % — HIGH (ref 0.1–0.3)
LYMPHOCYTES # BLD AUTO: 1.05 K/UL — LOW (ref 1.2–3.4)
LYMPHOCYTES # BLD AUTO: 6.2 % — LOW (ref 20.5–51.1)
MCHC RBC-ENTMCNC: 27.8 PG — SIGNIFICANT CHANGE UP (ref 27–31)
MCHC RBC-ENTMCNC: 32.5 G/DL — SIGNIFICANT CHANGE UP (ref 32–37)
MCV RBC AUTO: 85.6 FL — SIGNIFICANT CHANGE UP (ref 81–99)
MONOCYTES # BLD AUTO: 0.45 K/UL — SIGNIFICANT CHANGE UP (ref 0.1–0.6)
MONOCYTES NFR BLD AUTO: 2.7 % — SIGNIFICANT CHANGE UP (ref 1.7–9.3)
NEUTROPHILS # BLD AUTO: 15.22 K/UL — HIGH (ref 1.4–6.5)
NEUTROPHILS NFR BLD AUTO: 90.5 % — HIGH (ref 42.2–75.2)
NRBC # BLD: 0 /100 WBCS — SIGNIFICANT CHANGE UP (ref 0–0)
PLATELET # BLD AUTO: 344 K/UL — SIGNIFICANT CHANGE UP (ref 130–400)
POTASSIUM SERPL-MCNC: 4.7 MMOL/L — SIGNIFICANT CHANGE UP (ref 3.5–5)
POTASSIUM SERPL-SCNC: 4.7 MMOL/L — SIGNIFICANT CHANGE UP (ref 3.5–5)
RBC # BLD: 4.93 M/UL — SIGNIFICANT CHANGE UP (ref 4.2–5.4)
RBC # FLD: 13.2 % — SIGNIFICANT CHANGE UP (ref 11.5–14.5)
SODIUM SERPL-SCNC: 136 MMOL/L — SIGNIFICANT CHANGE UP (ref 135–146)
WBC # BLD: 16.81 K/UL — HIGH (ref 4.8–10.8)
WBC # FLD AUTO: 16.81 K/UL — HIGH (ref 4.8–10.8)

## 2023-02-18 RX ORDER — METHOCARBAMOL 500 MG/1
1 TABLET, FILM COATED ORAL
Qty: 30 | Refills: 0
Start: 2023-02-18 | End: 2023-02-27

## 2023-02-18 RX ORDER — OXYCODONE AND ACETAMINOPHEN 5; 325 MG/1; MG/1
1 TABLET ORAL
Qty: 40 | Refills: 0
Start: 2023-02-18 | End: 2023-02-27

## 2023-02-18 RX ORDER — CEFAZOLIN SODIUM 1 G
1000 VIAL (EA) INJECTION
Refills: 0 | Status: COMPLETED | OUTPATIENT
Start: 2023-02-18 | End: 2023-02-18

## 2023-02-18 RX ORDER — DOCUSATE SODIUM 100 MG
1 CAPSULE ORAL
Qty: 30 | Refills: 0
Start: 2023-02-18 | End: 2023-02-27

## 2023-02-18 RX ADMIN — CITALOPRAM 10 MILLIGRAM(S): 10 TABLET, FILM COATED ORAL at 12:02

## 2023-02-18 RX ADMIN — Medication 100 MILLIGRAM(S): at 11:51

## 2023-02-18 RX ADMIN — GABAPENTIN 800 MILLIGRAM(S): 400 CAPSULE ORAL at 05:13

## 2023-02-18 RX ADMIN — OXYCODONE HYDROCHLORIDE 5 MILLIGRAM(S): 5 TABLET ORAL at 05:13

## 2023-02-18 RX ADMIN — CYCLOBENZAPRINE HYDROCHLORIDE 5 MILLIGRAM(S): 10 TABLET, FILM COATED ORAL at 06:51

## 2023-02-18 RX ADMIN — OXYCODONE HYDROCHLORIDE 5 MILLIGRAM(S): 5 TABLET ORAL at 05:43

## 2023-02-18 RX ADMIN — Medication 975 MILLIGRAM(S): at 05:43

## 2023-02-18 RX ADMIN — Medication 975 MILLIGRAM(S): at 05:13

## 2023-02-18 NOTE — PROGRESS NOTE ADULT - SUBJECTIVE AND OBJECTIVE BOX
Neurosurgery Progress Note    Subjective: 37yFemale with a pmhx of Disorder of intervertebral disc of cervical spine    M47.22,60056    Abnormal weight gain    Acute sinusitis, unspecified    Allergic rhinitis, unspecified    Allergy, unspecified, initial encounter    Anaphylactic reaction due to tree nuts and seeds, initial encounter    Anxiety disorder, unspecified    Body mass index [BMI] 26.0-26.9, adult    Body mass index [BMI] 28.0-28.9, adult    Body mass index [BMI] 29.0-29.9, adult    Body mass index [BMI] 30.0-30.9, adult    Body mass index [BMI] 31.0-31.9, adult    Body mass index [BMI] 32.0-32.9, adult    Carpal tunnel syndrome, unspecified upper limb    Cervical disc disorder, unspecified, unspecified cervical region    Cervicalgia    Chronic rhinitis    Chronic sinusitis, unspecified    Contusion of left elbow, initial encounter    Elevated blood-pressure reading, without diagnosis of hypertension    Encounter for administrative examinations, unspecified    Encounter for general adult medical examination without abnormal findings    Encounter for other preprocedural examination    Encounter for screening for depression    Encounter for screening for other disorder    Essential (primary) hypertension    Headache, unspecified    Hypothyroidism, unspecified    Injury of right foot, initial encounter    Insomnia, unspecified    Low back pain, unspecified    Mild intermittent asthma, uncomplicated    Mixed hyperlipidemia    Nontoxic single thyroid nodule    Obesity, unspecified    Other bursitis, not elsewhere classified, unspecified site    Other chronic allergic conjunctivitis    Other seasonal allergic rhinitis    Other specified depressive episodes    Other spondylosis with radiculopathy, cervical region    Otitis media, unspecified, unspecified ear    Overweight    Pain in left knee    Pain in right knee    Pain in right shoulder    Personal history of other diseases of the nervous system and sense organs    Personal history of other mental and behavioral disorders    Post-traumatic stress disorder, unspecified    Rash and other nonspecific skin eruption    Spinal stenosis, cervical region    Type 2 diabetes mellitus with hyperglycemia    Type 2 diabetes mellitus without complications    Unspecified abnormal findings in urine    Unspecified temporomandibular joint disorder, unspecified side    Vitamin D deficiency, unspecified    Sex Assigned At Birth    History of Tobacco Use    Alcohol history    Has the patient ever used illegal drugs?    Number of children    FH: lung cancer (Father)    Handoff    MEWS Score    Anxiety and depression    H/O migraine    History of posttraumatic stress disorder (PTSD)    Cervical spondylosis with radiculopathy    Asthma, intermittent    DM (diabetes mellitus)    Disc disorder of cervical region    Cervical radiculopathy    History of cholecystectomy    H/O knee surgery    H/O rotator cuff surgery    NECK ARM PAIN    90+    SysAdmin_VisitLink      s/p     Allergies    [This allergen will not trigger allergy alert] gadolinium (Unknown)  [This allergen will not trigger allergy alert] Iodinated Contrast Media (Unknown)  [This allergen will not trigger allergy alert] Sulfamethoxazole / Trimethoprim  Reaction: Unknown (Unknown)  [This allergen will not trigger allergy alert] Sulfur (Unknown)  iodine (Other (Severe))  sulfa drugs (Anaphylaxis)  sulfasalazine (Unknown)    Intolerances        Vital Signs Last 24 Hrs  T(C): 36.1 (18 Feb 2023 09:30), Max: 36.7 (17 Feb 2023 16:00)  T(F): 97 (18 Feb 2023 09:30), Max: 98.1 (17 Feb 2023 16:00)  HR: 100 (18 Feb 2023 09:30) (90 - 100)  BP: 123/80 (18 Feb 2023 09:30) (114/79 - 148/85)  BP(mean): --  RR: 18 (18 Feb 2023 09:30) (15 - 18)  SpO2: 100% (18 Feb 2023 09:30) (95% - 100%)      acetaminophen     Tablet .. 975 milliGRAM(s) Oral every 8 hours  albuterol    90 MICROgram(s) HFA Inhaler 2 Puff(s) Inhalation every 6 hours PRN  citalopram 10 milliGRAM(s) Oral daily  cyclobenzaprine 5 milliGRAM(s) Oral three times a day PRN  dextrose 5%. 1000 milliLiter(s) IV Continuous <Continuous>  dextrose 5%. 1000 milliLiter(s) IV Continuous <Continuous>  dextrose 50% Injectable 25 Gram(s) IV Push once  dextrose 50% Injectable 12.5 Gram(s) IV Push once  dextrose 50% Injectable 25 Gram(s) IV Push once  dextrose Oral Gel 15 Gram(s) Oral once PRN  gabapentin 800 milliGRAM(s) Oral every 8 hours  glucagon  Injectable 1 milliGRAM(s) IntraMuscular once  insulin lispro (ADMELOG) corrective regimen sliding scale   SubCutaneous three times a day before meals  insulin lispro (ADMELOG) corrective regimen sliding scale   SubCutaneous at bedtime  montelukast 10 milliGRAM(s) Oral daily  nortriptyline 25 milliGRAM(s) Oral daily  oxyCODONE    IR 5 milliGRAM(s) Oral every 6 hours PRN          REVIEW OF SYSTEMS    [ ] A ten-point review of systems was otherwise negative except as noted.  [ ] Due to altered mental status/intubation, subjective information were not able to be obtained from the patient. History was obtained, to the extent possible, from review of the chart and collateral sources of information.      Exam:  AAOX3. Verbal function intact  tongue midline, facial motions symmetric  PERRLA, EOMI  Pronator Drift:   Finger to Nose intact  Motor: MAEx4, 5/5 power in b/l UE and LE  Sensation: intact to touch in all extremities        CBC Full  -  ( 18 Feb 2023 06:23 )  WBC Count : 16.81 K/uL  RBC Count : 4.93 M/uL  Hemoglobin : 13.7 g/dL  Hematocrit : 42.2 %  Platelet Count - Automated : 344 K/uL  Mean Cell Volume : 85.6 fL  Mean Cell Hemoglobin : 27.8 pg  Mean Cell Hemoglobin Concentration : 32.5 g/dL  Auto Neutrophil # : 15.22 K/uL  Auto Lymphocyte # : 1.05 K/uL  Auto Monocyte # : 0.45 K/uL  Auto Eosinophil # : 0.00 K/uL  Auto Basophil # : 0.01 K/uL  Auto Neutrophil % : 90.5 %  Auto Lymphocyte % : 6.2 %  Auto Monocyte % : 2.7 %  Auto Eosinophil % : 0.0 %  Auto Basophil % : 0.1 %    02-18    136  |  100  |  15  ----------------------------<  167<H>  4.7   |  24  |  0.7    Ca    9.5      18 Feb 2023 06:23              Wound:    Imaging:    Assessment/Plan:        Neurosurgery Progress Note    Pt seen and examined at bedside.  No current complaints, demonstrating an intact neuro exam.  Anticipated for DC home today.        Subjective: 37yFemale with a pmhx of Disorder of intervertebral disc of cervical spine    M47.22,12444    Abnormal weight gain    Acute sinusitis, unspecified    Allergic rhinitis, unspecified    Allergy, unspecified, initial encounter    Anaphylactic reaction due to tree nuts and seeds, initial encounter    Anxiety disorder, unspecified    Body mass index [BMI] 26.0-26.9, adult    Body mass index [BMI] 28.0-28.9, adult    Body mass index [BMI] 29.0-29.9, adult    Body mass index [BMI] 30.0-30.9, adult    Body mass index [BMI] 31.0-31.9, adult    Body mass index [BMI] 32.0-32.9, adult    Carpal tunnel syndrome, unspecified upper limb    Cervical disc disorder, unspecified, unspecified cervical region    Cervicalgia    Chronic rhinitis    Chronic sinusitis, unspecified    Contusion of left elbow, initial encounter    Elevated blood-pressure reading, without diagnosis of hypertension    Encounter for administrative examinations, unspecified    Encounter for general adult medical examination without abnormal findings    Encounter for other preprocedural examination    Encounter for screening for depression    Encounter for screening for other disorder    Essential (primary) hypertension    Headache, unspecified    Hypothyroidism, unspecified    Injury of right foot, initial encounter    Insomnia, unspecified    Low back pain, unspecified    Mild intermittent asthma, uncomplicated    Mixed hyperlipidemia    Nontoxic single thyroid nodule    Obesity, unspecified    Other bursitis, not elsewhere classified, unspecified site    Other chronic allergic conjunctivitis    Other seasonal allergic rhinitis    Other specified depressive episodes    Other spondylosis with radiculopathy, cervical region    Otitis media, unspecified, unspecified ear    Overweight    Pain in left knee    Pain in right knee    Pain in right shoulder    Personal history of other diseases of the nervous system and sense organs    Personal history of other mental and behavioral disorders    Post-traumatic stress disorder, unspecified    Rash and other nonspecific skin eruption    Spinal stenosis, cervical region    Type 2 diabetes mellitus with hyperglycemia    Type 2 diabetes mellitus without complications    Unspecified abnormal findings in urine    Unspecified temporomandibular joint disorder, unspecified side    Vitamin D deficiency, unspecified    Sex Assigned At Birth    History of Tobacco Use    Alcohol history    Has the patient ever used illegal drugs?    Number of children    FH: lung cancer (Father)    Handoff    MEWS Score    Anxiety and depression    H/O migraine    History of posttraumatic stress disorder (PTSD)    Cervical spondylosis with radiculopathy    Asthma, intermittent    DM (diabetes mellitus)    Disc disorder of cervical region    Cervical radiculopathy    History of cholecystectomy    H/O knee surgery    H/O rotator cuff surgery    NECK ARM PAIN    90+    SysAdmin_VisitLink      s/p     Allergies    [This allergen will not trigger allergy alert] gadolinium (Unknown)  [This allergen will not trigger allergy alert] Iodinated Contrast Media (Unknown)  [This allergen will not trigger allergy alert] Sulfamethoxazole / Trimethoprim  Reaction: Unknown (Unknown)  [This allergen will not trigger allergy alert] Sulfur (Unknown)  iodine (Other (Severe))  sulfa drugs (Anaphylaxis)  sulfasalazine (Unknown)    Intolerances        Vital Signs Last 24 Hrs  T(C): 36.1 (18 Feb 2023 09:30), Max: 36.7 (17 Feb 2023 16:00)  T(F): 97 (18 Feb 2023 09:30), Max: 98.1 (17 Feb 2023 16:00)  HR: 100 (18 Feb 2023 09:30) (90 - 100)  BP: 123/80 (18 Feb 2023 09:30) (114/79 - 148/85)  BP(mean): --  RR: 18 (18 Feb 2023 09:30) (15 - 18)  SpO2: 100% (18 Feb 2023 09:30) (95% - 100%)      acetaminophen     Tablet .. 975 milliGRAM(s) Oral every 8 hours  albuterol    90 MICROgram(s) HFA Inhaler 2 Puff(s) Inhalation every 6 hours PRN  citalopram 10 milliGRAM(s) Oral daily  cyclobenzaprine 5 milliGRAM(s) Oral three times a day PRN  dextrose 5%. 1000 milliLiter(s) IV Continuous <Continuous>  dextrose 5%. 1000 milliLiter(s) IV Continuous <Continuous>  dextrose 50% Injectable 25 Gram(s) IV Push once  dextrose 50% Injectable 12.5 Gram(s) IV Push once  dextrose 50% Injectable 25 Gram(s) IV Push once  dextrose Oral Gel 15 Gram(s) Oral once PRN  gabapentin 800 milliGRAM(s) Oral every 8 hours  glucagon  Injectable 1 milliGRAM(s) IntraMuscular once  insulin lispro (ADMELOG) corrective regimen sliding scale   SubCutaneous three times a day before meals  insulin lispro (ADMELOG) corrective regimen sliding scale   SubCutaneous at bedtime  montelukast 10 milliGRAM(s) Oral daily  nortriptyline 25 milliGRAM(s) Oral daily  oxyCODONE    IR 5 milliGRAM(s) Oral every 6 hours PRN          REVIEW OF SYSTEMS    [ ] A ten-point review of systems was otherwise negative except as noted.  [ ] Due to altered mental status/intubation, subjective information were not able to be obtained from the patient. History was obtained, to the extent possible, from review of the chart and collateral sources of information.      Exam:  AAOX3. Verbal function intact  tongue midline, facial motions symmetric  PERRLA, EOMI  Pronator Drift:   Finger to Nose intact  Motor: MAEx4, 5/5 power in b/l UE and LE  Sensation: SILT        CBC Full  -  ( 18 Feb 2023 06:23 )  WBC Count : 16.81 K/uL  RBC Count : 4.93 M/uL  Hemoglobin : 13.7 g/dL  Hematocrit : 42.2 %  Platelet Count - Automated : 344 K/uL  Mean Cell Volume : 85.6 fL  Mean Cell Hemoglobin : 27.8 pg  Mean Cell Hemoglobin Concentration : 32.5 g/dL  Auto Neutrophil # : 15.22 K/uL  Auto Lymphocyte # : 1.05 K/uL  Auto Monocyte # : 0.45 K/uL  Auto Eosinophil # : 0.00 K/uL  Auto Basophil # : 0.01 K/uL  Auto Neutrophil % : 90.5 %  Auto Lymphocyte % : 6.2 %  Auto Monocyte % : 2.7 %  Auto Eosinophil % : 0.0 %  Auto Basophil % : 0.1 %    02-18    136  |  100  |  15  ----------------------------<  167<H>  4.7   |  24  |  0.7    Ca    9.5      18 Feb 2023 06:23        Wound:  CDI    Assessment/Plan:   This is a 37 year old female POD# 1 S/P Right C5-6 Laminectomy    Stable exam  Tolerating PO diet  Pain well controlled  Anticipated for DC home today

## 2023-02-18 NOTE — DISCHARGE NOTE PROVIDER - NSDCMRMEDTOKEN_GEN_ALL_CORE_FT
citalopram 40 mg oral tablet: 1 tab(s) orally once a day  Colace 100 mg oral capsule: 1 cap(s) orally 3 times a day, As Needed -for constipation   EPINEPHrine 0.1 mg injectable kit: 0.1 milligram(s) intramuscularly once a day   fluticasone 50 mcg/inh nasal spray: 1 spray(s) nasal once a day  gabapentin 800 mg oral tablet: 1 tab(s) orally 3 times a day  metFORMIN 500 mg oral tablet: 1 tab(s) orally once a day  methocarbamol 750 mg oral tablet: 1 tab(s) orally every 8 hours, As Needed -for muscle spasm MDD:3   montelukast 10 mg oral tablet: 1 tab(s) orally once a day  nortriptyline 25 mg oral capsule: 1 cap(s) orally once a day  oxycodone-acetaminophen 5 mg-325 mg oral tablet: 1 tab(s) orally every 6 hours, As Needed -for severe pain MDD:4   tiZANidine 4 mg oral tablet: 1 tab(s) orally once a day  Ventolin HFA 90 mcg/inh inhalation aerosol: 2 puff(s) inhaled every 6 hours, As Needed

## 2023-02-18 NOTE — DISCHARGE NOTE PROVIDER - CARE PROVIDER_API CALL
Jodi Hester)  Surgery  Neurosurgery  [address] [place] [address], [address]  [place] [address]  Phone: [phone]  Fax: [phone]  Follow Up Time:

## 2023-02-18 NOTE — DISCHARGE NOTE PROVIDER - NSDCCPCAREPLAN_GEN_ALL_CORE_FT
PRINCIPAL DISCHARGE DIAGNOSIS  Diagnosis: Disc disorder of cervical region  Assessment and Plan of Treatment:

## 2023-02-18 NOTE — DISCHARGE NOTE PROVIDER - HOSPITAL COURSE
This is a 37 year old female that presented on 2/13 admitted to the surgical service for evaluation on radicular neck pain and subsequently underwent a Right C5-6 Laminectomy with Dr. Hester on 2/17. Pt tolerated procedure well and postoperatively was transferred to the PACU and subsequently to the floor. Patient's diet was advanced as tolerated and pain was controlled with IV, and subsequently po, pain medication as needed. Pt's post-operative hospital course was uncomplicated.    At time of discharge, the patient was afebrile, tolerating a regular diet, voiding appropriately, ambulating without difficulty, making flatus, and the patient's pain was well controlled. VS were WNL and pt was hemodynamically stable. Pt was medically cleared for discharge and patient was provided with discharge instructions regarding, but not limited to medication regimen and follow up.

## 2023-02-18 NOTE — DISCHARGE NOTE NURSING/CASE MANAGEMENT/SOCIAL WORK - PATIENT PORTAL LINK FT
You can access the FollowMyHealth Patient Portal offered by [facility] by registering at the following website: [email or web address]. By joining [employer]’s FollowMyHealth portal, you will also be able to view your health information using other applications (apps) compatible with our system.

## 2023-02-18 NOTE — PHYSICAL THERAPY INITIAL EVALUATION ADULT - GENERAL OBSERVATIONS, REHAB EVAL
14:20-14:35; Pt encountered in bed, pending OR with neurosurgery. Pt currently with no difficulty with functional mobility. Pt frustrated with communication; emotional support given. Pt educated on PT returning after surgery for evaluation for safe D/C planning. Will cont to f/u as appropriate.
9:15-9:40; Pt encountered in bed, agreeable to PT.

## 2023-02-18 NOTE — DISCHARGE NOTE PROVIDER - NSDCFUADDINST_GEN_ALL_CORE_FT
- Upon discharge,  please call to schedule a follow up with Dr. Hester in 1-2 weeks.  - Upon discharge, please call to make a follow up appointment with your primary care provider to discuss your recent hospitalization/operation.  - Keep dressings dry for 48 hours after which you may remove dressing and cleanse with soap and water in the shower (no scrubbing). Run water and soap over incision sites and pat dry (no scrubbing). No submerging your incision sites in water (i.e. no swimming or baths) for 2-3 weeks and avoid exposing the area to jets/streams of water.   - You can resume your normal activities as tolerated, but avoid heavy (>15lb.) lifting and strenuous exercise for 4-6 weeks.    - You were prescribed percocet (oxycodone-acetaminophen) for pain, take these only as needed.  Your pain should subside over the next few days.  While taking narcotic pain medications, you should not drive or operate heavy machinery. If taking with other medications containing acetaminophen (Tylenol), reduce your dose of percocet so that you  do not exceed 3000mg of acetaminophen per day.  - Narcotic pain medicine tends to cause constipation, you have been prescribed colace 3 times a day to help prevent that. Hold the colace if you start to have loose stools.  - If you experience fevers, chills, increasing abdominal pain, nausea, vomiting, inability to pass stool or gas, bleeding, or any other acute symptoms, please call your doctor and report to the emergency room immediately for further management.

## 2023-02-18 NOTE — DISCHARGE NOTE PROVIDER - NSDCFUSCHEDAPPT_GEN_ALL_CORE_FT
Marino Gibson  [facility] Physician [facility]  [address] [address]  Scheduled Appointment: 02/28/2023

## 2023-02-18 NOTE — PHYSICAL THERAPY INITIAL EVALUATION ADULT - PERTINENT HX OF CURRENT PROBLEM, REHAB EVAL
38y/o female with a pmhx of cervical disc disease., DM,  here for eval of neck pain with radiation down both arms. Pt sts pain began getting worse in left arm which prompted visit to see Dr. Mejia today and subsequently was sent to ED for admission. Pt has scheduled laminoforamenotomy of C5 C6 on 2/15.   Pt complaints of right arm pain since 2019 and progressively worsening and for the last 3 weeks, she also developing left arm pain with numbness and tingling, denied any B/B dysfunction. No difficulty walking.

## 2023-02-28 ENCOUNTER — APPOINTMENT (OUTPATIENT)
Dept: ORTHOPEDIC SURGERY | Facility: CLINIC | Age: 38
End: 2023-02-28
Payer: OTHER MISCELLANEOUS

## 2023-02-28 ENCOUNTER — NON-APPOINTMENT (OUTPATIENT)
Age: 38
End: 2023-02-28

## 2023-02-28 PROCEDURE — 99213 OFFICE O/P EST LOW 20 MIN: CPT

## 2023-02-28 PROCEDURE — 99072 ADDL SUPL MATRL&STAF TM PHE: CPT

## 2023-02-28 NOTE — ASSESSMENT
[FreeTextEntry1] : wenmt over findings\par needs more pt to right shouodler as she is having weakness, new pt script ordered\par cont pt and hep for shoulder\par pain control\par not working with temp total disability

## 2023-02-28 NOTE — IMAGING
[de-identified] :   On evaluation of right upper extremity she still has limited range of motion to passive and active, pain with terminal range of motion, Norvasc intact, compartments soft nontender, strength preserved.  She is still having significant tender palpation over cervical spine and paraspinal muscles with limited range of motion.

## 2023-02-28 NOTE — HISTORY OF PRESENT ILLNESS
[de-identified] : Patient is here for follow-up evaluation for right shoulder.  She is status post right shoulder arthroscopy but is still having significant neck pain which has not been approved by worker's compensation yet.\par \par recently had neck surgery\par still having pain but has improved

## 2023-03-01 DIAGNOSIS — M50.122 CERVICAL DISC DISORDER AT C5-C6 LEVEL WITH RADICULOPATHY: ICD-10-CM

## 2023-03-01 DIAGNOSIS — D72.829 ELEVATED WHITE BLOOD CELL COUNT, UNSPECIFIED: ICD-10-CM

## 2023-03-01 DIAGNOSIS — E11.9 TYPE 2 DIABETES MELLITUS WITHOUT COMPLICATIONS: ICD-10-CM

## 2023-03-01 DIAGNOSIS — F43.10 POST-TRAUMATIC STRESS DISORDER, UNSPECIFIED: ICD-10-CM

## 2023-03-01 DIAGNOSIS — E66.9 OBESITY, UNSPECIFIED: ICD-10-CM

## 2023-03-01 DIAGNOSIS — J45.20 MILD INTERMITTENT ASTHMA, UNCOMPLICATED: ICD-10-CM

## 2023-03-01 DIAGNOSIS — E87.5 HYPERKALEMIA: ICD-10-CM

## 2023-03-07 ENCOUNTER — APPOINTMENT (OUTPATIENT)
Dept: NEUROSURGERY | Facility: CLINIC | Age: 38
End: 2023-03-07

## 2023-03-14 ENCOUNTER — TRANSCRIPTION ENCOUNTER (OUTPATIENT)
Age: 38
End: 2023-03-14

## 2023-03-16 ENCOUNTER — APPOINTMENT (OUTPATIENT)
Dept: NEUROSURGERY | Facility: CLINIC | Age: 38
End: 2023-03-16
Payer: MEDICAID

## 2023-03-16 VITALS — HEIGHT: 65 IN | BODY MASS INDEX: 32.65 KG/M2 | WEIGHT: 196 LBS

## 2023-03-16 DIAGNOSIS — Z09 ENCOUNTER FOR FOLLOW-UP EXAMINATION AFTER COMPLETED TREATMENT FOR CONDITIONS OTHER THAN MALIGNANT NEOPLASM: ICD-10-CM

## 2023-03-16 PROCEDURE — 99024 POSTOP FOLLOW-UP VISIT: CPT

## 2023-03-17 NOTE — ASSESSMENT
[FreeTextEntry1] : This is a 37-year-old female who presents for initial postoperative encounter status post a right C5-6 laminectomy.  She has done very well postoperatively and she will initiate physical therapy efforts 2-3 times weekly along with a home PT program.  Home exercises also encouraged.\par \par With regards to her suture site, I have suggested that she apply bacitracin along the inferior aspect of the wound to promote additional healing and she expresses understanding.  With regards to her keloid appearance to the wound, she has been suggested to apply a scar away silicone patch to the site which will reduce the intensity of such appearance.\par \par She will return to the office in approximately 4 weeks for her second postoperative encounter and will contact us with any questions or concerns in the interim.\par \par Shalonda Shah PA-C\par Jodi Hester MD

## 2023-03-20 ENCOUNTER — FORM ENCOUNTER (OUTPATIENT)
Age: 38
End: 2023-03-20

## 2023-04-20 ENCOUNTER — APPOINTMENT (OUTPATIENT)
Dept: NEUROSURGERY | Facility: CLINIC | Age: 38
End: 2023-04-20
Payer: OTHER MISCELLANEOUS

## 2023-04-20 VITALS — HEIGHT: 66 IN | BODY MASS INDEX: 31.5 KG/M2 | WEIGHT: 196 LBS

## 2023-04-20 PROCEDURE — 99024 POSTOP FOLLOW-UP VISIT: CPT

## 2023-04-20 NOTE — HISTORY OF PRESENT ILLNESS
[FreeTextEntry1] : Briefly, Ms. Anderson, s/p right C5-6 laminoforaminotomy on 2/17/2023, was overall gradually improving but until on Sunday when she started experiencing neck/shoulder pain, burning sensation on the RUE, and numbness and tinglings in the fingertips. \par She feels that she is regressing, despite doing home exercises. \par Surgical site is healed.

## 2023-05-25 ENCOUNTER — APPOINTMENT (OUTPATIENT)
Dept: ORTHOPEDIC SURGERY | Facility: CLINIC | Age: 38
End: 2023-05-25
Payer: OTHER MISCELLANEOUS

## 2023-05-25 ENCOUNTER — NON-APPOINTMENT (OUTPATIENT)
Age: 38
End: 2023-05-25

## 2023-05-25 PROCEDURE — 99213 OFFICE O/P EST LOW 20 MIN: CPT

## 2023-05-25 NOTE — ASSESSMENT
[FreeTextEntry1] : went over findings\par needs more pt to right shoulder as she is having weakness\par cont pt and hep for shoulder\par pain control\par not working with temp total disability

## 2023-05-25 NOTE — HISTORY OF PRESENT ILLNESS
[de-identified] : Patient is here for follow-up evaluation for right shoulder.  She is status post right shoulder arthroscopy but is still having significant neck pain which has not been approved by worker's compensation yet.\par \par recently had neck surgery\par \par Recently started PT

## 2023-05-25 NOTE — IMAGING
[de-identified] :   On evaluation of right upper extremity she still has limited range of motion to passive and active, pain with terminal range of motion, Norvasc intact, compartments soft nontender, strength preserved.  She is still having significant tender palpation over cervical spine and paraspinal muscles with limited range of motion.

## 2023-08-03 ENCOUNTER — APPOINTMENT (OUTPATIENT)
Dept: NEUROSURGERY | Facility: CLINIC | Age: 38
End: 2023-08-03
Payer: OTHER MISCELLANEOUS

## 2023-08-03 VITALS — WEIGHT: 196 LBS | BODY MASS INDEX: 31.5 KG/M2 | HEIGHT: 66 IN

## 2023-08-03 PROCEDURE — 99213 OFFICE O/P EST LOW 20 MIN: CPT

## 2023-08-14 NOTE — ASSESSMENT
[FreeTextEntry1] : Given continued RUe radicular pain despite cervical decompression will order new MRI of the c spine to reassess for need for further cervical spine surgery

## 2023-08-14 NOTE — HISTORY OF PRESENT ILLNESS
[FreeTextEntry1] : Briefly, Ms. Anderson, s/p right C5-6 laminoforaminotomy on 2/17/2023. She continues to present with neck/shoulder pain, burning sensation on the RUE, and numbness and tingling in the fingertips. she does not feel it is better.

## 2023-08-14 NOTE — PHYSICAL EXAM
[FreeTextEntry1] : Constitutional: Well appearing, no distress HEENT: Normocephalic Atraumatic Psychiatric: Alert and oriented to all spheres, normal mood Pulmonary: no respiratory distress Abdomen: non-distended Vascular/Extremities: no edema, no cyanosis, no clubbing   Neurologic:  CN II-XII grossly intact ROM: restricted in c spine Palpation: no pain to palpation in cervical spine, no pain to palpation in lumbar spine Strength: Full strength in all major muscle groups, no atrophy, except RUE 4/5 pain/effort limited Sensation: Full sensation to light touch in all extremities Reflexes:                2+ patellar               2+ biceps               2+ ankle jerk              No Valentine's              No clonus              No babinski  Signs: SLR negative L'hermitte's negative  Gait: fluid posterior cervical incision c/d/i

## 2023-08-24 ENCOUNTER — APPOINTMENT (OUTPATIENT)
Dept: ORTHOPEDIC SURGERY | Facility: CLINIC | Age: 38
End: 2023-08-24
Payer: OTHER MISCELLANEOUS

## 2023-08-24 PROCEDURE — 99213 OFFICE O/P EST LOW 20 MIN: CPT

## 2023-08-24 NOTE — HISTORY OF PRESENT ILLNESS
[de-identified] : Patient is here for follow-up evaluation for right shoulder.  She is status post right shoulder arthroscopy but is still having significant neck pain which has not been approved by worker's compensation yet.  recently had neck surgery  Recently started PT

## 2023-08-24 NOTE — IMAGING
[de-identified] :   On evaluation of right upper extremity she still has limited range of motion to passive and active, pain with terminal range of motion, Norvasc intact, compartments soft nontender, strength preserved.  She is still having significant tender palpation over cervical spine and paraspinal muscles with limited range of motion.

## 2023-09-14 ENCOUNTER — APPOINTMENT (OUTPATIENT)
Dept: NEUROSURGERY | Facility: CLINIC | Age: 38
End: 2023-09-14
Payer: OTHER MISCELLANEOUS

## 2023-09-14 VITALS — WEIGHT: 196 LBS | BODY MASS INDEX: 31.5 KG/M2 | HEIGHT: 66 IN

## 2023-09-14 PROCEDURE — 99214 OFFICE O/P EST MOD 30 MIN: CPT

## 2023-10-11 ENCOUNTER — APPOINTMENT (OUTPATIENT)
Dept: PLASTIC SURGERY | Facility: CLINIC | Age: 38
End: 2023-10-11
Payer: OTHER MISCELLANEOUS

## 2023-10-11 VITALS — BODY MASS INDEX: 30.7 KG/M2 | WEIGHT: 191 LBS | HEIGHT: 66 IN

## 2023-10-11 PROCEDURE — 99203 OFFICE O/P NEW LOW 30 MIN: CPT

## 2023-10-11 RX ORDER — NORTRIPTYLINE HYDROCHLORIDE 25 MG/1
25 CAPSULE ORAL
Refills: 0 | Status: ACTIVE | COMMUNITY

## 2023-10-11 RX ORDER — ALBUTEROL 90 MCG
AEROSOL (GRAM) INHALATION
Refills: 0 | Status: ACTIVE | COMMUNITY

## 2023-11-03 ENCOUNTER — APPOINTMENT (OUTPATIENT)
Dept: PLASTIC SURGERY | Facility: CLINIC | Age: 38
End: 2023-11-03
Payer: OTHER MISCELLANEOUS

## 2023-11-03 PROCEDURE — 11900 INJECT SKIN LESIONS </W 7: CPT

## 2023-11-10 ENCOUNTER — OUTPATIENT (OUTPATIENT)
Dept: OUTPATIENT SERVICES | Facility: HOSPITAL | Age: 38
LOS: 1 days | End: 2023-11-10
Payer: COMMERCIAL

## 2023-11-10 VITALS
DIASTOLIC BLOOD PRESSURE: 84 MMHG | WEIGHT: 188.05 LBS | RESPIRATION RATE: 18 BRPM | OXYGEN SATURATION: 100 % | HEIGHT: 66 IN | HEART RATE: 94 BPM | TEMPERATURE: 98 F | SYSTOLIC BLOOD PRESSURE: 133 MMHG

## 2023-11-10 DIAGNOSIS — L91.0 HYPERTROPHIC SCAR: ICD-10-CM

## 2023-11-10 DIAGNOSIS — Z90.49 ACQUIRED ABSENCE OF OTHER SPECIFIED PARTS OF DIGESTIVE TRACT: Chronic | ICD-10-CM

## 2023-11-10 DIAGNOSIS — Z98.890 OTHER SPECIFIED POSTPROCEDURAL STATES: Chronic | ICD-10-CM

## 2023-11-10 DIAGNOSIS — M48.02 SPINAL STENOSIS, CERVICAL REGION: ICD-10-CM

## 2023-11-10 DIAGNOSIS — Z01.818 ENCOUNTER FOR OTHER PREPROCEDURAL EXAMINATION: ICD-10-CM

## 2023-11-10 LAB
ALBUMIN SERPL ELPH-MCNC: 5 G/DL — SIGNIFICANT CHANGE UP (ref 3.5–5.2)
ALBUMIN SERPL ELPH-MCNC: 5 G/DL — SIGNIFICANT CHANGE UP (ref 3.5–5.2)
ALP SERPL-CCNC: 203 U/L — HIGH (ref 30–115)
ALP SERPL-CCNC: 203 U/L — HIGH (ref 30–115)
ALT FLD-CCNC: 16 U/L — SIGNIFICANT CHANGE UP (ref 0–41)
ALT FLD-CCNC: 16 U/L — SIGNIFICANT CHANGE UP (ref 0–41)
ANION GAP SERPL CALC-SCNC: 17 MMOL/L — HIGH (ref 7–14)
ANION GAP SERPL CALC-SCNC: 17 MMOL/L — HIGH (ref 7–14)
APTT BLD: 35.7 SEC — SIGNIFICANT CHANGE UP (ref 27–39.2)
APTT BLD: 35.7 SEC — SIGNIFICANT CHANGE UP (ref 27–39.2)
AST SERPL-CCNC: 13 U/L — SIGNIFICANT CHANGE UP (ref 0–41)
AST SERPL-CCNC: 13 U/L — SIGNIFICANT CHANGE UP (ref 0–41)
BASOPHILS # BLD AUTO: 0.05 K/UL — SIGNIFICANT CHANGE UP (ref 0–0.2)
BASOPHILS # BLD AUTO: 0.05 K/UL — SIGNIFICANT CHANGE UP (ref 0–0.2)
BASOPHILS NFR BLD AUTO: 0.3 % — SIGNIFICANT CHANGE UP (ref 0–1)
BASOPHILS NFR BLD AUTO: 0.3 % — SIGNIFICANT CHANGE UP (ref 0–1)
BILIRUB SERPL-MCNC: <0.2 MG/DL — SIGNIFICANT CHANGE UP (ref 0.2–1.2)
BILIRUB SERPL-MCNC: <0.2 MG/DL — SIGNIFICANT CHANGE UP (ref 0.2–1.2)
BLD GP AB SCN SERPL QL: SIGNIFICANT CHANGE UP
BLD GP AB SCN SERPL QL: SIGNIFICANT CHANGE UP
BUN SERPL-MCNC: 11 MG/DL — SIGNIFICANT CHANGE UP (ref 10–20)
BUN SERPL-MCNC: 11 MG/DL — SIGNIFICANT CHANGE UP (ref 10–20)
CALCIUM SERPL-MCNC: 10 MG/DL — SIGNIFICANT CHANGE UP (ref 8.4–10.5)
CALCIUM SERPL-MCNC: 10 MG/DL — SIGNIFICANT CHANGE UP (ref 8.4–10.5)
CHLORIDE SERPL-SCNC: 101 MMOL/L — SIGNIFICANT CHANGE UP (ref 98–110)
CHLORIDE SERPL-SCNC: 101 MMOL/L — SIGNIFICANT CHANGE UP (ref 98–110)
CO2 SERPL-SCNC: 26 MMOL/L — SIGNIFICANT CHANGE UP (ref 17–32)
CO2 SERPL-SCNC: 26 MMOL/L — SIGNIFICANT CHANGE UP (ref 17–32)
CREAT SERPL-MCNC: 1.1 MG/DL — SIGNIFICANT CHANGE UP (ref 0.7–1.5)
CREAT SERPL-MCNC: 1.1 MG/DL — SIGNIFICANT CHANGE UP (ref 0.7–1.5)
EGFR: 65 ML/MIN/1.73M2 — SIGNIFICANT CHANGE UP
EGFR: 65 ML/MIN/1.73M2 — SIGNIFICANT CHANGE UP
EOSINOPHIL # BLD AUTO: 0.03 K/UL — SIGNIFICANT CHANGE UP (ref 0–0.7)
EOSINOPHIL # BLD AUTO: 0.03 K/UL — SIGNIFICANT CHANGE UP (ref 0–0.7)
EOSINOPHIL NFR BLD AUTO: 0.2 % — SIGNIFICANT CHANGE UP (ref 0–8)
EOSINOPHIL NFR BLD AUTO: 0.2 % — SIGNIFICANT CHANGE UP (ref 0–8)
GLUCOSE SERPL-MCNC: 103 MG/DL — HIGH (ref 70–99)
GLUCOSE SERPL-MCNC: 103 MG/DL — HIGH (ref 70–99)
HCT VFR BLD CALC: 45.7 % — SIGNIFICANT CHANGE UP (ref 37–47)
HCT VFR BLD CALC: 45.7 % — SIGNIFICANT CHANGE UP (ref 37–47)
HGB BLD-MCNC: 14.2 G/DL — SIGNIFICANT CHANGE UP (ref 12–16)
HGB BLD-MCNC: 14.2 G/DL — SIGNIFICANT CHANGE UP (ref 12–16)
IMM GRANULOCYTES NFR BLD AUTO: 0.5 % — HIGH (ref 0.1–0.3)
IMM GRANULOCYTES NFR BLD AUTO: 0.5 % — HIGH (ref 0.1–0.3)
INR BLD: 1.04 RATIO — SIGNIFICANT CHANGE UP (ref 0.65–1.3)
INR BLD: 1.04 RATIO — SIGNIFICANT CHANGE UP (ref 0.65–1.3)
LYMPHOCYTES # BLD AUTO: 1.91 K/UL — SIGNIFICANT CHANGE UP (ref 1.2–3.4)
LYMPHOCYTES # BLD AUTO: 1.91 K/UL — SIGNIFICANT CHANGE UP (ref 1.2–3.4)
LYMPHOCYTES # BLD AUTO: 12.2 % — LOW (ref 20.5–51.1)
LYMPHOCYTES # BLD AUTO: 12.2 % — LOW (ref 20.5–51.1)
MCHC RBC-ENTMCNC: 27.3 PG — SIGNIFICANT CHANGE UP (ref 27–31)
MCHC RBC-ENTMCNC: 27.3 PG — SIGNIFICANT CHANGE UP (ref 27–31)
MCHC RBC-ENTMCNC: 31.1 G/DL — LOW (ref 32–37)
MCHC RBC-ENTMCNC: 31.1 G/DL — LOW (ref 32–37)
MCV RBC AUTO: 87.7 FL — SIGNIFICANT CHANGE UP (ref 81–99)
MCV RBC AUTO: 87.7 FL — SIGNIFICANT CHANGE UP (ref 81–99)
MONOCYTES # BLD AUTO: 0.93 K/UL — HIGH (ref 0.1–0.6)
MONOCYTES # BLD AUTO: 0.93 K/UL — HIGH (ref 0.1–0.6)
MONOCYTES NFR BLD AUTO: 5.9 % — SIGNIFICANT CHANGE UP (ref 1.7–9.3)
MONOCYTES NFR BLD AUTO: 5.9 % — SIGNIFICANT CHANGE UP (ref 1.7–9.3)
MRSA PCR RESULT.: NEGATIVE — SIGNIFICANT CHANGE UP
MRSA PCR RESULT.: NEGATIVE — SIGNIFICANT CHANGE UP
NEUTROPHILS # BLD AUTO: 12.64 K/UL — HIGH (ref 1.4–6.5)
NEUTROPHILS # BLD AUTO: 12.64 K/UL — HIGH (ref 1.4–6.5)
NEUTROPHILS NFR BLD AUTO: 80.9 % — HIGH (ref 42.2–75.2)
NEUTROPHILS NFR BLD AUTO: 80.9 % — HIGH (ref 42.2–75.2)
NRBC # BLD: 0 /100 WBCS — SIGNIFICANT CHANGE UP (ref 0–0)
NRBC # BLD: 0 /100 WBCS — SIGNIFICANT CHANGE UP (ref 0–0)
PLATELET # BLD AUTO: 303 K/UL — SIGNIFICANT CHANGE UP (ref 130–400)
PLATELET # BLD AUTO: 303 K/UL — SIGNIFICANT CHANGE UP (ref 130–400)
PMV BLD: 12.1 FL — HIGH (ref 7.4–10.4)
PMV BLD: 12.1 FL — HIGH (ref 7.4–10.4)
POTASSIUM SERPL-MCNC: 4.2 MMOL/L — SIGNIFICANT CHANGE UP (ref 3.5–5)
POTASSIUM SERPL-MCNC: 4.2 MMOL/L — SIGNIFICANT CHANGE UP (ref 3.5–5)
POTASSIUM SERPL-SCNC: 4.2 MMOL/L — SIGNIFICANT CHANGE UP (ref 3.5–5)
POTASSIUM SERPL-SCNC: 4.2 MMOL/L — SIGNIFICANT CHANGE UP (ref 3.5–5)
PROT SERPL-MCNC: 7.8 G/DL — SIGNIFICANT CHANGE UP (ref 6–8)
PROT SERPL-MCNC: 7.8 G/DL — SIGNIFICANT CHANGE UP (ref 6–8)
PROTHROM AB SERPL-ACNC: 11.9 SEC — SIGNIFICANT CHANGE UP (ref 9.95–12.87)
PROTHROM AB SERPL-ACNC: 11.9 SEC — SIGNIFICANT CHANGE UP (ref 9.95–12.87)
RBC # BLD: 5.21 M/UL — SIGNIFICANT CHANGE UP (ref 4.2–5.4)
RBC # BLD: 5.21 M/UL — SIGNIFICANT CHANGE UP (ref 4.2–5.4)
RBC # FLD: 14.2 % — SIGNIFICANT CHANGE UP (ref 11.5–14.5)
RBC # FLD: 14.2 % — SIGNIFICANT CHANGE UP (ref 11.5–14.5)
SODIUM SERPL-SCNC: 144 MMOL/L — SIGNIFICANT CHANGE UP (ref 135–146)
SODIUM SERPL-SCNC: 144 MMOL/L — SIGNIFICANT CHANGE UP (ref 135–146)
T3 SERPL-MCNC: 96 NG/DL — SIGNIFICANT CHANGE UP (ref 80–200)
T3 SERPL-MCNC: 96 NG/DL — SIGNIFICANT CHANGE UP (ref 80–200)
T4 AB SER-ACNC: 8.1 UG/DL — SIGNIFICANT CHANGE UP (ref 4.6–12)
T4 AB SER-ACNC: 8.1 UG/DL — SIGNIFICANT CHANGE UP (ref 4.6–12)
TSH SERPL-MCNC: 0.38 UIU/ML — SIGNIFICANT CHANGE UP (ref 0.27–4.2)
TSH SERPL-MCNC: 0.38 UIU/ML — SIGNIFICANT CHANGE UP (ref 0.27–4.2)
WBC # BLD: 15.64 K/UL — HIGH (ref 4.8–10.8)
WBC # BLD: 15.64 K/UL — HIGH (ref 4.8–10.8)
WBC # FLD AUTO: 15.64 K/UL — HIGH (ref 4.8–10.8)
WBC # FLD AUTO: 15.64 K/UL — HIGH (ref 4.8–10.8)

## 2023-11-10 PROCEDURE — 85025 COMPLETE CBC W/AUTO DIFF WBC: CPT

## 2023-11-10 PROCEDURE — 85730 THROMBOPLASTIN TIME PARTIAL: CPT

## 2023-11-10 PROCEDURE — 86901 BLOOD TYPING SEROLOGIC RH(D): CPT

## 2023-11-10 PROCEDURE — 93005 ELECTROCARDIOGRAM TRACING: CPT

## 2023-11-10 PROCEDURE — 36415 COLL VENOUS BLD VENIPUNCTURE: CPT

## 2023-11-10 PROCEDURE — 99214 OFFICE O/P EST MOD 30 MIN: CPT | Mod: 25

## 2023-11-10 PROCEDURE — 87641 MR-STAPH DNA AMP PROBE: CPT

## 2023-11-10 PROCEDURE — 84443 ASSAY THYROID STIM HORMONE: CPT

## 2023-11-10 PROCEDURE — 86900 BLOOD TYPING SEROLOGIC ABO: CPT

## 2023-11-10 PROCEDURE — 86850 RBC ANTIBODY SCREEN: CPT

## 2023-11-10 PROCEDURE — 85610 PROTHROMBIN TIME: CPT

## 2023-11-10 PROCEDURE — 80053 COMPREHEN METABOLIC PANEL: CPT

## 2023-11-10 PROCEDURE — 87640 STAPH A DNA AMP PROBE: CPT

## 2023-11-10 PROCEDURE — 93010 ELECTROCARDIOGRAM REPORT: CPT

## 2023-11-10 PROCEDURE — 84436 ASSAY OF TOTAL THYROXINE: CPT

## 2023-11-10 PROCEDURE — 84480 ASSAY TRIIODOTHYRONINE (T3): CPT

## 2023-11-10 RX ORDER — FLUTICASONE PROPIONATE 50 MCG
1 SPRAY, SUSPENSION NASAL
Qty: 0 | Refills: 0 | DISCHARGE

## 2023-11-10 RX ORDER — METFORMIN HYDROCHLORIDE 850 MG/1
1 TABLET ORAL
Qty: 0 | Refills: 0 | DISCHARGE

## 2023-11-10 NOTE — H&P PST ADULT - NSICDXPASTSURGICALHX_GEN_ALL_CORE_FT
PAST SURGICAL HISTORY:  H/O knee surgery     H/O rotator cuff surgery     History of cholecystectomy     Previous back surgery
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
## 2023-11-10 NOTE — H&P PST ADULT - NSICDXFAMILYHX_GEN_ALL_CORE_FT
FAMILY HISTORY:  Father  Still living? No  FH: lung cancer, Age at diagnosis: Age Unknown    Mother  Still living? Yes, Estimated age: Age Unknown  FH: diabetes mellitus, Age at diagnosis: Age Unknown  FH: hypercholesterolemia, Age at diagnosis: Age Unknown

## 2023-11-10 NOTE — H&P PST ADULT - NSICDXPASTMEDICALHX_GEN_ALL_CORE_FT
PAST MEDICAL HISTORY:  Anxiety and depression     Asthma, intermittent     Cervical spondylosis with radiculopathy     H/O migraine     History of posttraumatic stress disorder (PTSD)

## 2023-11-11 DIAGNOSIS — L91.0 HYPERTROPHIC SCAR: ICD-10-CM

## 2023-11-11 DIAGNOSIS — M48.02 SPINAL STENOSIS, CERVICAL REGION: ICD-10-CM

## 2023-11-11 DIAGNOSIS — Z01.818 ENCOUNTER FOR OTHER PREPROCEDURAL EXAMINATION: ICD-10-CM

## 2023-12-07 ENCOUNTER — APPOINTMENT (OUTPATIENT)
Dept: ORTHOPEDIC SURGERY | Facility: CLINIC | Age: 38
End: 2023-12-07
Payer: OTHER MISCELLANEOUS

## 2023-12-07 PROCEDURE — 99213 OFFICE O/P EST LOW 20 MIN: CPT

## 2023-12-14 ENCOUNTER — OUTPATIENT (OUTPATIENT)
Dept: OUTPATIENT SERVICES | Facility: HOSPITAL | Age: 38
LOS: 1 days | End: 2023-12-14
Payer: COMMERCIAL

## 2023-12-14 VITALS
RESPIRATION RATE: 18 BRPM | HEART RATE: 100 BPM | WEIGHT: 188.05 LBS | SYSTOLIC BLOOD PRESSURE: 168 MMHG | DIASTOLIC BLOOD PRESSURE: 76 MMHG | HEIGHT: 66 IN | OXYGEN SATURATION: 98 % | TEMPERATURE: 98 F

## 2023-12-14 DIAGNOSIS — L91.0 HYPERTROPHIC SCAR: ICD-10-CM

## 2023-12-14 DIAGNOSIS — Z98.890 OTHER SPECIFIED POSTPROCEDURAL STATES: Chronic | ICD-10-CM

## 2023-12-14 DIAGNOSIS — Z01.818 ENCOUNTER FOR OTHER PREPROCEDURAL EXAMINATION: ICD-10-CM

## 2023-12-14 DIAGNOSIS — Z90.49 ACQUIRED ABSENCE OF OTHER SPECIFIED PARTS OF DIGESTIVE TRACT: Chronic | ICD-10-CM

## 2023-12-14 LAB
ALBUMIN SERPL ELPH-MCNC: 4.6 G/DL — SIGNIFICANT CHANGE UP (ref 3.5–5.2)
ALBUMIN SERPL ELPH-MCNC: 4.6 G/DL — SIGNIFICANT CHANGE UP (ref 3.5–5.2)
ALP SERPL-CCNC: 159 U/L — HIGH (ref 30–115)
ALP SERPL-CCNC: 159 U/L — HIGH (ref 30–115)
ALT FLD-CCNC: 15 U/L — SIGNIFICANT CHANGE UP (ref 0–41)
ALT FLD-CCNC: 15 U/L — SIGNIFICANT CHANGE UP (ref 0–41)
ANION GAP SERPL CALC-SCNC: 15 MMOL/L — HIGH (ref 7–14)
ANION GAP SERPL CALC-SCNC: 15 MMOL/L — HIGH (ref 7–14)
AST SERPL-CCNC: 12 U/L — SIGNIFICANT CHANGE UP (ref 0–41)
AST SERPL-CCNC: 12 U/L — SIGNIFICANT CHANGE UP (ref 0–41)
BASOPHILS # BLD AUTO: 0.06 K/UL — SIGNIFICANT CHANGE UP (ref 0–0.2)
BASOPHILS # BLD AUTO: 0.06 K/UL — SIGNIFICANT CHANGE UP (ref 0–0.2)
BASOPHILS NFR BLD AUTO: 0.5 % — SIGNIFICANT CHANGE UP (ref 0–1)
BASOPHILS NFR BLD AUTO: 0.5 % — SIGNIFICANT CHANGE UP (ref 0–1)
BILIRUB SERPL-MCNC: <0.2 MG/DL — SIGNIFICANT CHANGE UP (ref 0.2–1.2)
BILIRUB SERPL-MCNC: <0.2 MG/DL — SIGNIFICANT CHANGE UP (ref 0.2–1.2)
BLD GP AB SCN SERPL QL: SIGNIFICANT CHANGE UP
BLD GP AB SCN SERPL QL: SIGNIFICANT CHANGE UP
BUN SERPL-MCNC: 9 MG/DL — LOW (ref 10–20)
BUN SERPL-MCNC: 9 MG/DL — LOW (ref 10–20)
CALCIUM SERPL-MCNC: 9.1 MG/DL — SIGNIFICANT CHANGE UP (ref 8.4–10.5)
CALCIUM SERPL-MCNC: 9.1 MG/DL — SIGNIFICANT CHANGE UP (ref 8.4–10.5)
CHLORIDE SERPL-SCNC: 99 MMOL/L — SIGNIFICANT CHANGE UP (ref 98–110)
CHLORIDE SERPL-SCNC: 99 MMOL/L — SIGNIFICANT CHANGE UP (ref 98–110)
CO2 SERPL-SCNC: 24 MMOL/L — SIGNIFICANT CHANGE UP (ref 17–32)
CO2 SERPL-SCNC: 24 MMOL/L — SIGNIFICANT CHANGE UP (ref 17–32)
CREAT SERPL-MCNC: 0.7 MG/DL — SIGNIFICANT CHANGE UP (ref 0.7–1.5)
CREAT SERPL-MCNC: 0.7 MG/DL — SIGNIFICANT CHANGE UP (ref 0.7–1.5)
EGFR: 112 ML/MIN/1.73M2 — SIGNIFICANT CHANGE UP
EGFR: 112 ML/MIN/1.73M2 — SIGNIFICANT CHANGE UP
EOSINOPHIL # BLD AUTO: 0.05 K/UL — SIGNIFICANT CHANGE UP (ref 0–0.7)
EOSINOPHIL # BLD AUTO: 0.05 K/UL — SIGNIFICANT CHANGE UP (ref 0–0.7)
EOSINOPHIL NFR BLD AUTO: 0.5 % — SIGNIFICANT CHANGE UP (ref 0–8)
EOSINOPHIL NFR BLD AUTO: 0.5 % — SIGNIFICANT CHANGE UP (ref 0–8)
GLUCOSE SERPL-MCNC: 118 MG/DL — HIGH (ref 70–99)
GLUCOSE SERPL-MCNC: 118 MG/DL — HIGH (ref 70–99)
HCT VFR BLD CALC: 44 % — SIGNIFICANT CHANGE UP (ref 37–47)
HCT VFR BLD CALC: 44 % — SIGNIFICANT CHANGE UP (ref 37–47)
HGB BLD-MCNC: 13.7 G/DL — SIGNIFICANT CHANGE UP (ref 12–16)
HGB BLD-MCNC: 13.7 G/DL — SIGNIFICANT CHANGE UP (ref 12–16)
IMM GRANULOCYTES NFR BLD AUTO: 0.4 % — HIGH (ref 0.1–0.3)
IMM GRANULOCYTES NFR BLD AUTO: 0.4 % — HIGH (ref 0.1–0.3)
LYMPHOCYTES # BLD AUTO: 2.36 K/UL — SIGNIFICANT CHANGE UP (ref 1.2–3.4)
LYMPHOCYTES # BLD AUTO: 2.36 K/UL — SIGNIFICANT CHANGE UP (ref 1.2–3.4)
LYMPHOCYTES # BLD AUTO: 21.4 % — SIGNIFICANT CHANGE UP (ref 20.5–51.1)
LYMPHOCYTES # BLD AUTO: 21.4 % — SIGNIFICANT CHANGE UP (ref 20.5–51.1)
MCHC RBC-ENTMCNC: 27.2 PG — SIGNIFICANT CHANGE UP (ref 27–31)
MCHC RBC-ENTMCNC: 27.2 PG — SIGNIFICANT CHANGE UP (ref 27–31)
MCHC RBC-ENTMCNC: 31.1 G/DL — LOW (ref 32–37)
MCHC RBC-ENTMCNC: 31.1 G/DL — LOW (ref 32–37)
MCV RBC AUTO: 87.3 FL — SIGNIFICANT CHANGE UP (ref 81–99)
MCV RBC AUTO: 87.3 FL — SIGNIFICANT CHANGE UP (ref 81–99)
MONOCYTES # BLD AUTO: 0.67 K/UL — HIGH (ref 0.1–0.6)
MONOCYTES # BLD AUTO: 0.67 K/UL — HIGH (ref 0.1–0.6)
MONOCYTES NFR BLD AUTO: 6.1 % — SIGNIFICANT CHANGE UP (ref 1.7–9.3)
MONOCYTES NFR BLD AUTO: 6.1 % — SIGNIFICANT CHANGE UP (ref 1.7–9.3)
MRSA PCR RESULT.: NEGATIVE — SIGNIFICANT CHANGE UP
MRSA PCR RESULT.: NEGATIVE — SIGNIFICANT CHANGE UP
NEUTROPHILS # BLD AUTO: 7.87 K/UL — HIGH (ref 1.4–6.5)
NEUTROPHILS # BLD AUTO: 7.87 K/UL — HIGH (ref 1.4–6.5)
NEUTROPHILS NFR BLD AUTO: 71.1 % — SIGNIFICANT CHANGE UP (ref 42.2–75.2)
NEUTROPHILS NFR BLD AUTO: 71.1 % — SIGNIFICANT CHANGE UP (ref 42.2–75.2)
NRBC # BLD: 0 /100 WBCS — SIGNIFICANT CHANGE UP (ref 0–0)
NRBC # BLD: 0 /100 WBCS — SIGNIFICANT CHANGE UP (ref 0–0)
PLATELET # BLD AUTO: 307 K/UL — SIGNIFICANT CHANGE UP (ref 130–400)
PLATELET # BLD AUTO: 307 K/UL — SIGNIFICANT CHANGE UP (ref 130–400)
PMV BLD: 11.8 FL — HIGH (ref 7.4–10.4)
PMV BLD: 11.8 FL — HIGH (ref 7.4–10.4)
POTASSIUM SERPL-MCNC: 4.1 MMOL/L — SIGNIFICANT CHANGE UP (ref 3.5–5)
POTASSIUM SERPL-MCNC: 4.1 MMOL/L — SIGNIFICANT CHANGE UP (ref 3.5–5)
POTASSIUM SERPL-SCNC: 4.1 MMOL/L — SIGNIFICANT CHANGE UP (ref 3.5–5)
POTASSIUM SERPL-SCNC: 4.1 MMOL/L — SIGNIFICANT CHANGE UP (ref 3.5–5)
PROT SERPL-MCNC: 7 G/DL — SIGNIFICANT CHANGE UP (ref 6–8)
PROT SERPL-MCNC: 7 G/DL — SIGNIFICANT CHANGE UP (ref 6–8)
RBC # BLD: 5.04 M/UL — SIGNIFICANT CHANGE UP (ref 4.2–5.4)
RBC # BLD: 5.04 M/UL — SIGNIFICANT CHANGE UP (ref 4.2–5.4)
RBC # FLD: 14.7 % — HIGH (ref 11.5–14.5)
RBC # FLD: 14.7 % — HIGH (ref 11.5–14.5)
SODIUM SERPL-SCNC: 138 MMOL/L — SIGNIFICANT CHANGE UP (ref 135–146)
SODIUM SERPL-SCNC: 138 MMOL/L — SIGNIFICANT CHANGE UP (ref 135–146)
WBC # BLD: 11.05 K/UL — HIGH (ref 4.8–10.8)
WBC # BLD: 11.05 K/UL — HIGH (ref 4.8–10.8)
WBC # FLD AUTO: 11.05 K/UL — HIGH (ref 4.8–10.8)
WBC # FLD AUTO: 11.05 K/UL — HIGH (ref 4.8–10.8)

## 2023-12-14 PROCEDURE — 99214 OFFICE O/P EST MOD 30 MIN: CPT | Mod: 25

## 2023-12-14 PROCEDURE — 86900 BLOOD TYPING SEROLOGIC ABO: CPT

## 2023-12-14 PROCEDURE — 86901 BLOOD TYPING SEROLOGIC RH(D): CPT

## 2023-12-14 PROCEDURE — 36415 COLL VENOUS BLD VENIPUNCTURE: CPT

## 2023-12-14 PROCEDURE — 87641 MR-STAPH DNA AMP PROBE: CPT

## 2023-12-14 PROCEDURE — 80053 COMPREHEN METABOLIC PANEL: CPT

## 2023-12-14 PROCEDURE — 85025 COMPLETE CBC W/AUTO DIFF WBC: CPT

## 2023-12-14 PROCEDURE — 87640 STAPH A DNA AMP PROBE: CPT

## 2023-12-14 PROCEDURE — 86850 RBC ANTIBODY SCREEN: CPT

## 2023-12-14 RX ORDER — MONTELUKAST 4 MG/1
1 TABLET, CHEWABLE ORAL
Refills: 0 | DISCHARGE

## 2023-12-14 RX ORDER — NORTRIPTYLINE HYDROCHLORIDE 10 MG/1
1 CAPSULE ORAL
Refills: 0 | DISCHARGE

## 2023-12-14 RX ORDER — CITALOPRAM 10 MG/1
1 TABLET, FILM COATED ORAL
Refills: 0 | DISCHARGE

## 2023-12-14 RX ORDER — LEVOCETIRIZINE DIHYDROCHLORIDE 0.5 MG/ML
1 SOLUTION ORAL
Refills: 0 | DISCHARGE

## 2023-12-14 NOTE — H&P PST ADULT - NSICDXPASTMEDICALHX_GEN_ALL_CORE_FT
Never
PAST MEDICAL HISTORY:  Anxiety and depression     Asthma, intermittent     Cervical spondylosis with radiculopathy     H/O migraine     History of posttraumatic stress disorder (PTSD)
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
## 2023-12-14 NOTE — H&P PST ADULT - HISTORY OF PRESENT ILLNESS
PATIENT CURRENTLY DENIES CHEST PAIN  SHORTNESS OF BREATH  PALPITATIONS,  DYSURIA  PT BEING TREATED BY PMD FOR SINUS INFECTION WITH 3 DAYS LEFT OF TREATMENT       Denies travel outside the [place] in the past 30 days  Patient denies any signs or symptoms of COVID 19 and denies contact with known positive individuals.         Anesthesia Alert  YES--Difficult Airway CLASS IV  YES--History of neck surgery or radiation C5-C6 LAMINOFORAMINOTOMY 2/2023   YES--Limited ROM of neck  NO--History of Malignant hyperthermia  NO--No personal or family history of Pseudocholinesterase deficiency.  NO--Prior Anesthesia Complication  NO--Latex Allergy  NO--Loose teeth  NO--History of Rheumatoid Arthritis  NO--Bleeding risk  NO--PAZ  NO--Other_____    Duke Activity Status Index (DASI)    RESULT SUMMARY:  28.7 points  The higher the score (maximum 58.2), the higher the functional status.    6.27 METs  INPUTS:  Take care of self —> 2.75 = Yes  Walk indoors —> 1.75 = Yes  Walk 1&ndash;2 blocks on level ground —> 2.75 = Yes  Climb a flight of stairs or walk up a hill —> 5.5 = Yes  Run a short distance —> 8 = Yes  Do light work around the house —> 2.7 = Yes  Do moderate work around the house —> 0 = No  Do heavy work around the house —> 0 = No  Do yardwork —> 0 = No  Have sexual relations —> 5.25 = Yes  Participate in moderate recreational activities —> 0 = No  Participate in strenuous sports —> 0 = No    Revised Cardiac Risk Index for Pre-Operative Risk     RESULT SUMMARY:  0 points  Class I Risk    3.9 %  30-day risk of death, MI, or cardiac arrest  INPUTS:  Elevated-risk surgery —> 0 = No  History of ischemic heart disease —> 0 = No  History of congestive heart failure —> 0 = No  History of cerebrovascular disease —> 0 = No  Pre-operative treatment with insulin —> 0 = No  Pre-operative creatinine >2 mg/dL / 176.8 µmol/L —> 0 = No      Opioid Risk Assessment Tool (Female)       Family history of substance abuse NO            Alcohol (1)            Illegal Drugs (2)            Prescription drugs (4)       Personal history of substance abuse NO            Alcohol (3)            Illegal Drugs (4)            Prescription drugs (5)       Age between 16-45 (1) NO       History of preadolescent sexual abuse (3) NO       Psychological disease (ADD, ADHD, OCD, Bipolar Disorder, Schizophrenia, Depression) (2) YES    Scoring Totals:  Low Risk (0-3)  Moderate Risk (4-7)  High Risk (>/=8) SCORE 3      PT DENIES ANY RASHES, ABRASION, OR OPEN WOUNDS OR CUTS    AS PER THE PT, THIS IS HIS/HER COMPLETE MEDICAL AND SURGICAL HX, INCLUDING MEDICATIONS PRESCRIBED AND OVER THE COUNTER    Patient verbalized understanding of instructions and was given the opportunity to ask questions and have them answered.    pt denies any suicidal ideation or thoughts, pt states not a threat to self or others

## 2023-12-15 DIAGNOSIS — L91.0 HYPERTROPHIC SCAR: ICD-10-CM

## 2023-12-15 DIAGNOSIS — Z01.818 ENCOUNTER FOR OTHER PREPROCEDURAL EXAMINATION: ICD-10-CM

## 2023-12-20 ENCOUNTER — NON-APPOINTMENT (OUTPATIENT)
Age: 38
End: 2023-12-20

## 2023-12-28 ENCOUNTER — APPOINTMENT (OUTPATIENT)
Dept: PLASTIC SURGERY | Facility: AMBULATORY SURGERY CENTER | Age: 38
End: 2023-12-28

## 2024-01-04 ENCOUNTER — APPOINTMENT (OUTPATIENT)
Dept: PLASTIC SURGERY | Facility: CLINIC | Age: 39
End: 2024-01-04

## 2024-01-18 ENCOUNTER — NON-APPOINTMENT (OUTPATIENT)
Age: 39
End: 2024-01-18

## 2024-01-19 ENCOUNTER — INPATIENT (INPATIENT)
Facility: HOSPITAL | Age: 39
LOS: 4 days | Discharge: HOME | DRG: 347 | End: 2024-01-24
Attending: HOSPITALIST | Admitting: STUDENT IN AN ORGANIZED HEALTH CARE EDUCATION/TRAINING PROGRAM
Payer: COMMERCIAL

## 2024-01-19 VITALS
TEMPERATURE: 98 F | OXYGEN SATURATION: 98 % | DIASTOLIC BLOOD PRESSURE: 106 MMHG | HEIGHT: 66 IN | RESPIRATION RATE: 17 BRPM | HEART RATE: 103 BPM | SYSTOLIC BLOOD PRESSURE: 145 MMHG

## 2024-01-19 DIAGNOSIS — Z98.890 OTHER SPECIFIED POSTPROCEDURAL STATES: Chronic | ICD-10-CM

## 2024-01-19 DIAGNOSIS — M79.2 NEURALGIA AND NEURITIS, UNSPECIFIED: ICD-10-CM

## 2024-01-19 DIAGNOSIS — Z90.49 ACQUIRED ABSENCE OF OTHER SPECIFIED PARTS OF DIGESTIVE TRACT: Chronic | ICD-10-CM

## 2024-01-19 LAB
ANION GAP SERPL CALC-SCNC: 13 MMOL/L — SIGNIFICANT CHANGE UP (ref 7–14)
BASOPHILS # BLD AUTO: 0.06 K/UL — SIGNIFICANT CHANGE UP (ref 0–0.2)
BASOPHILS NFR BLD AUTO: 0.4 % — SIGNIFICANT CHANGE UP (ref 0–1)
BUN SERPL-MCNC: 10 MG/DL — SIGNIFICANT CHANGE UP (ref 10–20)
CALCIUM SERPL-MCNC: 9 MG/DL — SIGNIFICANT CHANGE UP (ref 8.4–10.4)
CHLORIDE SERPL-SCNC: 103 MMOL/L — SIGNIFICANT CHANGE UP (ref 98–110)
CO2 SERPL-SCNC: 23 MMOL/L — SIGNIFICANT CHANGE UP (ref 17–32)
CREAT SERPL-MCNC: 0.8 MG/DL — SIGNIFICANT CHANGE UP (ref 0.7–1.5)
EGFR: 95 ML/MIN/1.73M2 — SIGNIFICANT CHANGE UP
EOSINOPHIL # BLD AUTO: 0.06 K/UL — SIGNIFICANT CHANGE UP (ref 0–0.7)
EOSINOPHIL NFR BLD AUTO: 0.4 % — SIGNIFICANT CHANGE UP (ref 0–8)
GLUCOSE SERPL-MCNC: 72 MG/DL — SIGNIFICANT CHANGE UP (ref 70–99)
HCG SERPL QL: NEGATIVE — SIGNIFICANT CHANGE UP
HCT VFR BLD CALC: 42.8 % — SIGNIFICANT CHANGE UP (ref 37–47)
HGB BLD-MCNC: 13.9 G/DL — SIGNIFICANT CHANGE UP (ref 12–16)
IMM GRANULOCYTES NFR BLD AUTO: 0.5 % — HIGH (ref 0.1–0.3)
LYMPHOCYTES # BLD AUTO: 19.7 % — LOW (ref 20.5–51.1)
LYMPHOCYTES # BLD AUTO: 3.08 K/UL — SIGNIFICANT CHANGE UP (ref 1.2–3.4)
MCHC RBC-ENTMCNC: 28 PG — SIGNIFICANT CHANGE UP (ref 27–31)
MCHC RBC-ENTMCNC: 32.5 G/DL — SIGNIFICANT CHANGE UP (ref 32–37)
MCV RBC AUTO: 86.3 FL — SIGNIFICANT CHANGE UP (ref 81–99)
MONOCYTES # BLD AUTO: 1.14 K/UL — HIGH (ref 0.1–0.6)
MONOCYTES NFR BLD AUTO: 7.3 % — SIGNIFICANT CHANGE UP (ref 1.7–9.3)
NEUTROPHILS # BLD AUTO: 11.2 K/UL — HIGH (ref 1.4–6.5)
NEUTROPHILS NFR BLD AUTO: 71.7 % — SIGNIFICANT CHANGE UP (ref 42.2–75.2)
NRBC # BLD: 0 /100 WBCS — SIGNIFICANT CHANGE UP (ref 0–0)
PLATELET # BLD AUTO: 300 K/UL — SIGNIFICANT CHANGE UP (ref 130–400)
PMV BLD: 11.9 FL — HIGH (ref 7.4–10.4)
POTASSIUM SERPL-MCNC: 5.2 MMOL/L — HIGH (ref 3.5–5)
POTASSIUM SERPL-SCNC: 5.2 MMOL/L — HIGH (ref 3.5–5)
RBC # BLD: 4.96 M/UL — SIGNIFICANT CHANGE UP (ref 4.2–5.4)
RBC # FLD: 14.7 % — HIGH (ref 11.5–14.5)
SODIUM SERPL-SCNC: 139 MMOL/L — SIGNIFICANT CHANGE UP (ref 135–146)
WBC # BLD: 15.62 K/UL — HIGH (ref 4.8–10.8)
WBC # FLD AUTO: 15.62 K/UL — HIGH (ref 4.8–10.8)

## 2024-01-19 PROCEDURE — 83735 ASSAY OF MAGNESIUM: CPT

## 2024-01-19 PROCEDURE — 86850 RBC ANTIBODY SCREEN: CPT

## 2024-01-19 PROCEDURE — 86900 BLOOD TYPING SEROLOGIC ABO: CPT

## 2024-01-19 PROCEDURE — 86140 C-REACTIVE PROTEIN: CPT

## 2024-01-19 PROCEDURE — 72141 MRI NECK SPINE W/O DYE: CPT | Mod: MA

## 2024-01-19 PROCEDURE — 85610 PROTHROMBIN TIME: CPT

## 2024-01-19 PROCEDURE — 84703 CHORIONIC GONADOTROPIN ASSAY: CPT

## 2024-01-19 PROCEDURE — 36415 COLL VENOUS BLD VENIPUNCTURE: CPT

## 2024-01-19 PROCEDURE — 93971 EXTREMITY STUDY: CPT | Mod: RT

## 2024-01-19 PROCEDURE — 85730 THROMBOPLASTIN TIME PARTIAL: CPT

## 2024-01-19 PROCEDURE — 80053 COMPREHEN METABOLIC PANEL: CPT

## 2024-01-19 PROCEDURE — 85652 RBC SED RATE AUTOMATED: CPT

## 2024-01-19 PROCEDURE — 86901 BLOOD TYPING SEROLOGIC RH(D): CPT

## 2024-01-19 PROCEDURE — 99285 EMERGENCY DEPT VISIT HI MDM: CPT

## 2024-01-19 PROCEDURE — 85025 COMPLETE CBC W/AUTO DIFF WBC: CPT

## 2024-01-19 RX ORDER — DEXAMETHASONE 0.5 MG/5ML
10 ELIXIR ORAL ONCE
Refills: 0 | Status: COMPLETED | OUTPATIENT
Start: 2024-01-19 | End: 2024-01-19

## 2024-01-19 RX ORDER — MORPHINE SULFATE 50 MG/1
6 CAPSULE, EXTENDED RELEASE ORAL ONCE
Refills: 0 | Status: DISCONTINUED | OUTPATIENT
Start: 2024-01-19 | End: 2024-01-19

## 2024-01-19 RX ORDER — KETOROLAC TROMETHAMINE 30 MG/ML
15 SYRINGE (ML) INJECTION ONCE
Refills: 0 | Status: DISCONTINUED | OUTPATIENT
Start: 2024-01-19 | End: 2024-01-19

## 2024-01-19 RX ADMIN — Medication 15 MILLIGRAM(S): at 17:05

## 2024-01-19 RX ADMIN — MORPHINE SULFATE 6 MILLIGRAM(S): 50 CAPSULE, EXTENDED RELEASE ORAL at 21:04

## 2024-01-19 RX ADMIN — Medication 10 MILLIGRAM(S): at 16:59

## 2024-01-19 NOTE — ED ADULT NURSE NOTE - NSFALLUNIVINTERV_ED_ALL_ED
Bed/Stretcher in lowest position, wheels locked, appropriate side rails in place/Call bell, personal items and telephone in reach/Instruct patient to call for assistance before getting out of bed/chair/stretcher/Non-slip footwear applied when patient is off stretcher/[place] to call system/Physically safe environment - no spills, clutter or unnecessary equipment/Purposeful proactive rounding/Room/bathroom lighting operational, light cord in reach

## 2024-01-19 NOTE — ED PROVIDER NOTE - PHYSICAL EXAMINATION
CONST: Well appearing uncomfortable from neck pain  EYES: PERRL, EOMI, Sclera and conjunctiva clear.   NECK: tender right paraspinal neck  CARD: Normal S1 S2; Normal rate and rhythm  RESP: Equal BS B/L, No wheezes, rhonchi or rales. No distress  GI: Soft, non-tender, non-distended.  MS: Normal ROM in all extremities. No midline spinal tenderness.  SKIN: Warm, dry, no acute rashes. Good turgor  NEURO: A&Ox3, No focal deficits. Strength 5/5 with no sensory deficits.

## 2024-01-19 NOTE — ED ADULT NURSE NOTE - OBJECTIVE STATEMENT
BIBA from [facility] for right neck & shoulder pain s/p cervical epidual on 1/16  [facility] gave 30mg toradol IM @11am

## 2024-01-19 NOTE — ED ADULT NURSE NOTE - CHIEF COMPLAINT QUOTE
BIBA from [facility] for right neck & shoulder pain s/p cervical epidual on 1/16  [facility] gave 30mg toradol IM @11am

## 2024-01-19 NOTE — ED PROVIDER NOTE - OBJECTIVE STATEMENT
Patient with history of anxiety and depression as well as chronic neck pain status post work-related injury 2019.  With subsequent decompression surgery C5-C6 which failed and now goes to pain management for periodic epidurals.  Patient presents today with constant burning shooting pain from right side of neck down right arm x 3 days which began right after a cervical epidural procedure.  Patient states her pain is intermittent sharp shooting pain but now it is constant.  Unrelieved with her normal pain medications of Lyrica Topamax nortriptyline and tizanidine.  Denies fevers or chills or weakness

## 2024-01-19 NOTE — ED ADULT NURSE NOTE - NSICDXPASTSURGICALHX_GEN_ALL_CORE_FT
PAST SURGICAL HISTORY:  H/O knee surgery     H/O rotator cuff surgery     History of cholecystectomy     Previous back surgery     
Patient/Caregiver provided printed discharge information.

## 2024-01-19 NOTE — CONSULT NOTE ADULT - NS ATTEND AMEND GEN_ALL_CORE FT
Agree with the above plan, I did go to visit and examined the patient however she was not in the room at this time.  Follow-up cervical MRI.

## 2024-01-19 NOTE — ED ADULT TRIAGE NOTE - CHIEF COMPLAINT QUOTE
BIBA from [facility] for right neck & shoulder pain s/p cervical epidual on 1/16  [facility] gave 30mg toradol IM @11am

## 2024-01-19 NOTE — CONSULT NOTE ADULT - SUBJECTIVE AND OBJECTIVE BOX
HPI: 37 yo F  Patient with history of anxiety and depression as well as chronic neck pain status post work-related injury 2019.    With subsequent decompression surgery C5-C6 which failed and now goes to pain management for periodic epidurals.    Patient presents today with constant burning shooting pain from right side of neck down right arm x 3 days which began right after a cervical epidural procedure.    Patient states her pain is intermittent sharp shooting pain but now it is constant.    Unrelieved with her normal pain medications of Lyrica Topamax nortriptyline and tizanidine.    Denies fevers or chills or weakness, Cervical MRI showed:  Multilevel cervical spondylosis with varying degrees of foraminal   stenosis,worse at C4-5 with moderate left worse than right foraminal   stenosis, and at C5-6 with moderate-severe right/moderate left foraminal   stenosis. C4-5 with moderate spinal stenosis with mild flattening of the ventral   cord. No cord edema.  Neurosurgery consult was initiated.    Pt was seen and examined at bedside in UC. c/o sever burning pain radiating form the right side of the neck down the right arm with weakness, numbness and tingling sine Tuesday after ALONZO.  Denies fever, chill, bowel or bladder incontinence.        PAST MEDICAL & SURGICAL HISTORY:  Anxiety and depression      H/O migraine      History of posttraumatic stress disorder (PTSD)      Cervical spondylosis with radiculopathy      Asthma, intermittent      History of cholecystectomy      H/O knee surgery      H/O rotator cuff surgery      Previous back surgery          Home Medications:  cefuroxime 250 mg oral tablet: 1 tab(s) orally 2 times a day (14 Dec 2023 11:09)  citalopram 40 mg oral tablet: 1 tab(s) orally once a day (at bedtime) (14 Dec 2023 11:04)  gabapentin 800 mg oral tablet: 1 tab(s) orally 3 times a day (14 Dec 2023 11:05)  montelukast 10 mg oral tablet: 1 tab(s) orally once a day (at bedtime) (14 Dec 2023 11:05)  nortriptyline 25 mg oral capsule: 1 cap(s) orally once a day (at bedtime) (14 Dec 2023 11:04)  tiZANidine 4 mg oral tablet: 1 tab(s) orally once a day (at bedtime) (14 Dec 2023 11:05)  Ventolin HFA 90 mcg/inh inhalation aerosol: 2 puff(s) inhaled every 6 hours, As Needed (14 Dec 2023 11:05)  Xyzal 5 mg oral tablet: 1 tab(s) orally once a day (14 Dec 2023 11:05)      Allergies    [This allergen will not trigger allergy alert] Sulfur (Unknown)  [This allergen will not trigger allergy alert] Sulfamethoxazole / Trimethoprim  Reaction: Unknown (Unknown)  iodine (Other (Severe))  [This allergen will not trigger allergy alert] Iodinated Contrast Media (Unknown)  [This allergen will not trigger allergy alert] gadolinium (Unknown)  sulfa drugs (Anaphylaxis)  sulfasalazine (Unknown)    Intolerances        ROS:  [ X] A ten-point review of systems is negative except as noted   [  ] Due to altered mental status/intubation, subjective information were not able to be obtained from the patient. History was obtained, to the extent possible, from review of the chart and collateral sources of information    MEDICATIONS  (STANDING):    MEDICATIONS  (PRN):      ICU Vital Signs Last 24 Hrs  T(C): 36.7 (19 Jan 2024 12:48), Max: 36.7 (19 Jan 2024 12:48)  T(F): 98.1 (19 Jan 2024 12:48), Max: 98.1 (19 Jan 2024 12:48)  HR: 103 (19 Jan 2024 12:48) (103 - 103)  BP: 145/106 (19 Jan 2024 12:48) (145/106 - 145/106)  BP(mean): --  ABP: --  ABP(mean): --  RR: 17 (19 Jan 2024 12:48) (17 - 17)  SpO2: 98% (19 Jan 2024 12:48) (98% - 98%)    O2 Parameters below as of 19 Jan 2024 12:48  Patient On (Oxygen Delivery Method): room air            I&O's Detail                            13.9   15.62 )-----------( 300      ( 19 Jan 2024 15:34 )             42.8     01-19    139  |  103  |  10  ----------------------------<  72  5.2<H>   |  23  |  0.8    Ca    9.0      19 Jan 2024 15:34          Urinalysis Basic - ( 19 Jan 2024 15:34 )    Color: x / Appearance: x / SG: x / pH: x  Gluc: 72 mg/dL / Ketone: x  / Bili: x / Urobili: x   Blood: x / Protein: x / Nitrite: x   Leuk Esterase: x / RBC: x / WBC x   Sq Epi: x / Non Sq Epi: x / Bacteria: x        On PE:    A&Ox3 with clear speech  PERRL    LAZAR - good strength  Follows complex commands      Radiology:    < from: MR Cervical Spine No Cont (02.14.23 @ 22:38) >  IMPRESSION:    Multilevel cervical spondylosis with varying degrees of foraminal   stenosis, worse at C4-5 with moderate left worse than right foraminal   stenosis, and at C5-6 with moderate-severe right/moderate left foraminal   stenosis.    C4-5 with moderate spinal stenosis with mild flattening of the ventral   cord. No cord edema.    < end of copied text >    Assessment: 37 yo F with right cervical radiculopathy s/p ALONZO and moderate foraminal stenosis C4-6.      Plan: HPI: 39 yo F  Patient with history of anxiety and depression as well as chronic neck pain status post work-related injury 2019.    With subsequent decompression surgery C5-C6 which failed and now goes to pain management for periodic epidurals.    Patient presents today with constant burning shooting pain from right side of neck down right arm x 3 days which began right after a cervical epidural procedure.    Patient states her pain is intermittent sharp shooting pain but now it is constant.    Unrelieved with her normal pain medications of Lyrica Topamax nortriptyline and tizanidine.    Denies fevers or chills or weakness, Cervical MRI showed:  Multilevel cervical spondylosis with varying degrees of foraminal   stenosis, worse at C4-5 with moderate left worse than right foraminal   stenosis, and at C5-6 with moderate-severe right/moderate left foraminal   stenosis. C4-5 with moderate spinal stenosis with mild flattening of the ventral   cord. No cord edema.  Neurosurgery consult was initiated.    Pt was seen and examined at bedside in UC2A. c/o severe burning pain radiating form the right side of the neck down the right arm with weakness, numbness and tingling sine Tuesday after ALONZO.  Denies fever, chill, bowel or bladder incontinence.        PAST MEDICAL & SURGICAL HISTORY:  Anxiety and depression      H/O migraine      History of posttraumatic stress disorder (PTSD)      Cervical spondylosis with radiculopathy      Asthma, intermittent      History of cholecystectomy      H/O knee surgery      H/O rotator cuff surgery      Previous back surgery          Home Medications:  cefuroxime 250 mg oral tablet: 1 tab(s) orally 2 times a day (14 Dec 2023 11:09)  citalopram 40 mg oral tablet: 1 tab(s) orally once a day (at bedtime) (14 Dec 2023 11:04)  gabapentin 800 mg oral tablet: 1 tab(s) orally 3 times a day (14 Dec 2023 11:05)  montelukast 10 mg oral tablet: 1 tab(s) orally once a day (at bedtime) (14 Dec 2023 11:05)  nortriptyline 25 mg oral capsule: 1 cap(s) orally once a day (at bedtime) (14 Dec 2023 11:04)  tiZANidine 4 mg oral tablet: 1 tab(s) orally once a day (at bedtime) (14 Dec 2023 11:05)  Ventolin HFA 90 mcg/inh inhalation aerosol: 2 puff(s) inhaled every 6 hours, As Needed (14 Dec 2023 11:05)  Xyzal 5 mg oral tablet: 1 tab(s) orally once a day (14 Dec 2023 11:05)      Allergies    [This allergen will not trigger allergy alert] Sulfur (Unknown)  [This allergen will not trigger allergy alert] Sulfamethoxazole / Trimethoprim  Reaction: Unknown (Unknown)  iodine (Other (Severe))  [This allergen will not trigger allergy alert] Iodinated Contrast Media (Unknown)  [This allergen will not trigger allergy alert] gadolinium (Unknown)  sulfa drugs (Anaphylaxis)  sulfasalazine (Unknown)    Intolerances        ROS:  [ X] A ten-point review of systems is negative except as noted   [  ] Due to altered mental status/intubation, subjective information were not able to be obtained from the patient. History was obtained, to the extent possible, from review of the chart and collateral sources of information    MEDICATIONS  (STANDING):    MEDICATIONS  (PRN):      ICU Vital Signs Last 24 Hrs  T(C): 36.7 (19 Jan 2024 12:48), Max: 36.7 (19 Jan 2024 12:48)  T(F): 98.1 (19 Jan 2024 12:48), Max: 98.1 (19 Jan 2024 12:48)  HR: 103 (19 Jan 2024 12:48) (103 - 103)  BP: 145/106 (19 Jan 2024 12:48) (145/106 - 145/106)  BP(mean): --  ABP: --  ABP(mean): --  RR: 17 (19 Jan 2024 12:48) (17 - 17)  SpO2: 98% (19 Jan 2024 12:48) (98% - 98%)    O2 Parameters below as of 19 Jan 2024 12:48  Patient On (Oxygen Delivery Method): room air            I&O's Detail                            13.9   15.62 )-----------( 300      ( 19 Jan 2024 15:34 )             42.8     01-19    139  |  103  |  10  ----------------------------<  72  5.2<H>   |  23  |  0.8    Ca    9.0      19 Jan 2024 15:34          Urinalysis Basic - ( 19 Jan 2024 15:34 )    Color: x / Appearance: x / SG: x / pH: x  Gluc: 72 mg/dL / Ketone: x  / Bili: x / Urobili: x   Blood: x / Protein: x / Nitrite: x   Leuk Esterase: x / RBC: x / WBC x   Sq Epi: x / Non Sq Epi: x / Bacteria: x        On PE:    A&Ox3 with clear speech  PERRL    LAZAR - good strength  Follows complex commands      Radiology:    < from: MR Cervical Spine No Cont (02.14.23 @ 22:38) >  IMPRESSION:    Multilevel cervical spondylosis with varying degrees of foraminal   stenosis, worse at C4-5 with moderate left worse than right foraminal   stenosis, and at C5-6 with moderate-severe right/moderate left foraminal   stenosis.    C4-5 with moderate spinal stenosis with mild flattening of the ventral   cord. No cord edema.    < end of copied text >    Assessment: 39 yo F with right cervical radiculopathy s/p ALONZO and moderate foraminal stenosis C4-6.      Plan:    MRI cervical with and w/o contrast  Consider admission for PM if positive for hematoma or abscess  if negative - follow up as outpatient with Dr. Hester.      D/w attg   HPI: 39 yo F  Patient with history of anxiety and depression as well as chronic neck pain status post work-related injury 2019.    With subsequent decompression surgery C5-C6 which failed and now goes to pain management for periodic epidurals.    Patient presents today with constant burning shooting pain from right side of neck down right arm x 3 days which began right after a cervical epidural procedure.    Patient states her pain is intermittent sharp shooting pain but now it is constant.    Unrelieved with her normal pain medications of Lyrica Topamax nortriptyline and tizanidine.    Denies fevers or chills or weakness, Cervical MRI showed:  Multilevel cervical spondylosis with varying degrees of foraminal   stenosis, worse at C4-5 with moderate left worse than right foraminal   stenosis, and at C5-6 with moderate-severe right/moderate left foraminal   stenosis. C4-5 with moderate spinal stenosis with mild flattening of the ventral   cord. No cord edema.  Neurosurgery consult was initiated.    Pt was seen and examined at bedside in UC2A. c/o severe burning pain radiating form the right side of the neck down the right arm with weakness, numbness and tingling sine Tuesday after ALONZO.  Denies fever, chill, bowel or bladder incontinence.        PAST MEDICAL & SURGICAL HISTORY:  Anxiety and depression      H/O migraine      History of posttraumatic stress disorder (PTSD)      Cervical spondylosis with radiculopathy      Asthma, intermittent      History of cholecystectomy      H/O knee surgery      H/O rotator cuff surgery      Previous back surgery          Home Medications:  cefuroxime 250 mg oral tablet: 1 tab(s) orally 2 times a day (14 Dec 2023 11:09)  citalopram 40 mg oral tablet: 1 tab(s) orally once a day (at bedtime) (14 Dec 2023 11:04)  gabapentin 800 mg oral tablet: 1 tab(s) orally 3 times a day (14 Dec 2023 11:05)  montelukast 10 mg oral tablet: 1 tab(s) orally once a day (at bedtime) (14 Dec 2023 11:05)  nortriptyline 25 mg oral capsule: 1 cap(s) orally once a day (at bedtime) (14 Dec 2023 11:04)  tiZANidine 4 mg oral tablet: 1 tab(s) orally once a day (at bedtime) (14 Dec 2023 11:05)  Ventolin HFA 90 mcg/inh inhalation aerosol: 2 puff(s) inhaled every 6 hours, As Needed (14 Dec 2023 11:05)  Xyzal 5 mg oral tablet: 1 tab(s) orally once a day (14 Dec 2023 11:05)      Allergies    [This allergen will not trigger allergy alert] Sulfur (Unknown)  [This allergen will not trigger allergy alert] Sulfamethoxazole / Trimethoprim  Reaction: Unknown (Unknown)  iodine (Other (Severe))  [This allergen will not trigger allergy alert] Iodinated Contrast Media (Unknown)  [This allergen will not trigger allergy alert] gadolinium (Unknown)  sulfa drugs (Anaphylaxis)  sulfasalazine (Unknown)    Intolerances        ROS:  [ X] A ten-point review of systems is negative except as noted   [  ] Due to altered mental status/intubation, subjective information were not able to be obtained from the patient. History was obtained, to the extent possible, from review of the chart and collateral sources of information    MEDICATIONS  (STANDING):    MEDICATIONS  (PRN):      ICU Vital Signs Last 24 Hrs  T(C): 36.7 (19 Jan 2024 12:48), Max: 36.7 (19 Jan 2024 12:48)  T(F): 98.1 (19 Jan 2024 12:48), Max: 98.1 (19 Jan 2024 12:48)  HR: 103 (19 Jan 2024 12:48) (103 - 103)  BP: 145/106 (19 Jan 2024 12:48) (145/106 - 145/106)  BP(mean): --  ABP: --  ABP(mean): --  RR: 17 (19 Jan 2024 12:48) (17 - 17)  SpO2: 98% (19 Jan 2024 12:48) (98% - 98%)    O2 Parameters below as of 19 Jan 2024 12:48  Patient On (Oxygen Delivery Method): room air            I&O's Detail                            13.9   15.62 )-----------( 300      ( 19 Jan 2024 15:34 )             42.8     01-19    139  |  103  |  10  ----------------------------<  72  5.2<H>   |  23  |  0.8    Ca    9.0      19 Jan 2024 15:34          Urinalysis Basic - ( 19 Jan 2024 15:34 )    Color: x / Appearance: x / SG: x / pH: x  Gluc: 72 mg/dL / Ketone: x  / Bili: x / Urobili: x   Blood: x / Protein: x / Nitrite: x   Leuk Esterase: x / RBC: x / WBC x   Sq Epi: x / Non Sq Epi: x / Bacteria: x        On PE:    A&Ox3 with clear speech  PERRL    LAZAR - good strength  Follows complex commands      Radiology:    < from: MR Cervical Spine No Cont (02.14.23 @ 22:38) >  IMPRESSION:    Multilevel cervical spondylosis with varying degrees of foraminal   stenosis, worse at C4-5 with moderate left worse than right foraminal   stenosis, and at C5-6 with moderate-severe right/moderate left foraminal   stenosis.    C4-5 with moderate spinal stenosis with mild flattening of the ventral   cord. No cord edema.    < end of copied text >    Assessment: 39 yo F with right cervical radiculopathy s/p ALONZO and moderate foraminal stenosis C4-6.      Plan:    MRI cervical with and w/o contrast  Will evaluate MRI upon completion for any acute compressive pathology   if negative - follow up as outpatient with Dr. Hester.      D/w attg   HPI: 39 yo F  Patient with history of anxiety and depression as well as chronic neck pain status post work-related injury 2019.    With subsequent decompression surgery C5-C6 with Dr. Hester which failed and now goes to pain management for periodic epidurals.    Patient presents today with constant burning shooting pain from right side of neck down right arm x 3 days which began right after a cervical epidural procedure.    Patient states her pain is intermittent sharp shooting pain but now it is constant.    Unrelieved with her normal pain medications of Lyrica Topamax nortriptyline and tizanidine.    Denies fevers or chills or weakness, Cervical MRI showed:  Multilevel cervical spondylosis with varying degrees of foraminal   stenosis, worse at C4-5 with moderate left worse than right foraminal   stenosis, and at C5-6 with moderate-severe right/moderate left foraminal   stenosis. C4-5 with moderate spinal stenosis with mild flattening of the ventral   cord. No cord edema.  Neurosurgery consult was initiated.    Pt was seen and examined at bedside in UC2A. c/o severe burning pain radiating form the right side of the neck down the right arm with weakness, numbness and tingling sine Tuesday after ALONZO.  Denies fever, chill, bowel or bladder incontinence.        PAST MEDICAL & SURGICAL HISTORY:  Anxiety and depression      H/O migraine      History of posttraumatic stress disorder (PTSD)      Cervical spondylosis with radiculopathy      Asthma, intermittent      History of cholecystectomy      H/O knee surgery      H/O rotator cuff surgery      Previous back surgery          Home Medications:  cefuroxime 250 mg oral tablet: 1 tab(s) orally 2 times a day (14 Dec 2023 11:09)  citalopram 40 mg oral tablet: 1 tab(s) orally once a day (at bedtime) (14 Dec 2023 11:04)  gabapentin 800 mg oral tablet: 1 tab(s) orally 3 times a day (14 Dec 2023 11:05)  montelukast 10 mg oral tablet: 1 tab(s) orally once a day (at bedtime) (14 Dec 2023 11:05)  nortriptyline 25 mg oral capsule: 1 cap(s) orally once a day (at bedtime) (14 Dec 2023 11:04)  tiZANidine 4 mg oral tablet: 1 tab(s) orally once a day (at bedtime) (14 Dec 2023 11:05)  Ventolin HFA 90 mcg/inh inhalation aerosol: 2 puff(s) inhaled every 6 hours, As Needed (14 Dec 2023 11:05)  Xyzal 5 mg oral tablet: 1 tab(s) orally once a day (14 Dec 2023 11:05)      Allergies    [This allergen will not trigger allergy alert] Sulfur (Unknown)  [This allergen will not trigger allergy alert] Sulfamethoxazole / Trimethoprim  Reaction: Unknown (Unknown)  iodine (Other (Severe))  [This allergen will not trigger allergy alert] Iodinated Contrast Media (Unknown)  [This allergen will not trigger allergy alert] gadolinium (Unknown)  sulfa drugs (Anaphylaxis)  sulfasalazine (Unknown)    Intolerances        ROS:  [ X] A ten-point review of systems is negative except as noted   [  ] Due to altered mental status/intubation, subjective information were not able to be obtained from the patient. History was obtained, to the extent possible, from review of the chart and collateral sources of information    MEDICATIONS  (STANDING):    MEDICATIONS  (PRN):      ICU Vital Signs Last 24 Hrs  T(C): 36.7 (19 Jan 2024 12:48), Max: 36.7 (19 Jan 2024 12:48)  T(F): 98.1 (19 Jan 2024 12:48), Max: 98.1 (19 Jan 2024 12:48)  HR: 103 (19 Jan 2024 12:48) (103 - 103)  BP: 145/106 (19 Jan 2024 12:48) (145/106 - 145/106)  BP(mean): --  ABP: --  ABP(mean): --  RR: 17 (19 Jan 2024 12:48) (17 - 17)  SpO2: 98% (19 Jan 2024 12:48) (98% - 98%)    O2 Parameters below as of 19 Jan 2024 12:48  Patient On (Oxygen Delivery Method): room air            I&O's Detail                            13.9   15.62 )-----------( 300      ( 19 Jan 2024 15:34 )             42.8     01-19    139  |  103  |  10  ----------------------------<  72  5.2<H>   |  23  |  0.8    Ca    9.0      19 Jan 2024 15:34          Urinalysis Basic - ( 19 Jan 2024 15:34 )    Color: x / Appearance: x / SG: x / pH: x  Gluc: 72 mg/dL / Ketone: x  / Bili: x / Urobili: x   Blood: x / Protein: x / Nitrite: x   Leuk Esterase: x / RBC: x / WBC x   Sq Epi: x / Non Sq Epi: x / Bacteria: x        On PE:    A&Ox3 with clear speech  PERRL    LAZAR - good strength  Follows complex commands      Radiology:    < from: MR Cervical Spine No Cont (02.14.23 @ 22:38) >  IMPRESSION:    Multilevel cervical spondylosis with varying degrees of foraminal   stenosis, worse at C4-5 with moderate left worse than right foraminal   stenosis, and at C5-6 with moderate-severe right/moderate left foraminal   stenosis.    C4-5 with moderate spinal stenosis with mild flattening of the ventral   cord. No cord edema.    < end of copied text >    Assessment: 39 yo F with right cervical radiculopathy s/p ALONZO and moderate foraminal stenosis C4-6.      Plan:    MRI cervical with and w/o contrast  Will evaluate MRI upon completion for any acute compressive pathology   if negative - follow up as outpatient with Dr. Hester.      D/w attg

## 2024-01-19 NOTE — ED PROVIDER NOTE - PROGRESS NOTE DETAILS
received signout from gila troy - pt pending evaluation by nsx attending; pt with neck pain s/p recent epidural; case d/w neurosurgery - admit to medicine for pain control; mri w/wo contrast cervical spine; mri ordered; pain meds given; will admit

## 2024-01-20 LAB
ALBUMIN SERPL ELPH-MCNC: 4.4 G/DL — SIGNIFICANT CHANGE UP (ref 3.5–5.2)
ALP SERPL-CCNC: 174 U/L — HIGH (ref 30–115)
ALT FLD-CCNC: 34 U/L — SIGNIFICANT CHANGE UP (ref 0–41)
ANION GAP SERPL CALC-SCNC: 13 MMOL/L — SIGNIFICANT CHANGE UP (ref 7–14)
APTT BLD: 32.4 SEC — SIGNIFICANT CHANGE UP (ref 27–39.2)
AST SERPL-CCNC: 13 U/L — SIGNIFICANT CHANGE UP (ref 0–41)
BASOPHILS # BLD AUTO: 0.02 K/UL — SIGNIFICANT CHANGE UP (ref 0–0.2)
BASOPHILS NFR BLD AUTO: 0.1 % — SIGNIFICANT CHANGE UP (ref 0–1)
BILIRUB SERPL-MCNC: 0.2 MG/DL — SIGNIFICANT CHANGE UP (ref 0.2–1.2)
BLD GP AB SCN SERPL QL: SIGNIFICANT CHANGE UP
BUN SERPL-MCNC: 14 MG/DL — SIGNIFICANT CHANGE UP (ref 10–20)
CALCIUM SERPL-MCNC: 9.1 MG/DL — SIGNIFICANT CHANGE UP (ref 8.4–10.5)
CHLORIDE SERPL-SCNC: 105 MMOL/L — SIGNIFICANT CHANGE UP (ref 98–110)
CO2 SERPL-SCNC: 21 MMOL/L — SIGNIFICANT CHANGE UP (ref 17–32)
CREAT SERPL-MCNC: 0.7 MG/DL — SIGNIFICANT CHANGE UP (ref 0.7–1.5)
CRP SERPL-MCNC: 11.3 MG/L — HIGH
EGFR: 112 ML/MIN/1.73M2 — SIGNIFICANT CHANGE UP
EOSINOPHIL # BLD AUTO: 0 K/UL — SIGNIFICANT CHANGE UP (ref 0–0.7)
EOSINOPHIL NFR BLD AUTO: 0 % — SIGNIFICANT CHANGE UP (ref 0–8)
GLUCOSE SERPL-MCNC: 127 MG/DL — HIGH (ref 70–99)
HCT VFR BLD CALC: 41.3 % — SIGNIFICANT CHANGE UP (ref 37–47)
HGB BLD-MCNC: 13.6 G/DL — SIGNIFICANT CHANGE UP (ref 12–16)
IMM GRANULOCYTES NFR BLD AUTO: 0.8 % — HIGH (ref 0.1–0.3)
INR BLD: 1.03 RATIO — SIGNIFICANT CHANGE UP (ref 0.65–1.3)
LYMPHOCYTES # BLD AUTO: 1.31 K/UL — SIGNIFICANT CHANGE UP (ref 1.2–3.4)
LYMPHOCYTES # BLD AUTO: 7.1 % — LOW (ref 20.5–51.1)
MAGNESIUM SERPL-MCNC: 2.2 MG/DL — SIGNIFICANT CHANGE UP (ref 1.8–2.4)
MCHC RBC-ENTMCNC: 28.3 PG — SIGNIFICANT CHANGE UP (ref 27–31)
MCHC RBC-ENTMCNC: 32.9 G/DL — SIGNIFICANT CHANGE UP (ref 32–37)
MCV RBC AUTO: 86 FL — SIGNIFICANT CHANGE UP (ref 81–99)
MONOCYTES # BLD AUTO: 0.49 K/UL — SIGNIFICANT CHANGE UP (ref 0.1–0.6)
MONOCYTES NFR BLD AUTO: 2.7 % — SIGNIFICANT CHANGE UP (ref 1.7–9.3)
NEUTROPHILS # BLD AUTO: 16.38 K/UL — HIGH (ref 1.4–6.5)
NEUTROPHILS NFR BLD AUTO: 89.3 % — HIGH (ref 42.2–75.2)
NRBC # BLD: 0 /100 WBCS — SIGNIFICANT CHANGE UP (ref 0–0)
PLATELET # BLD AUTO: 316 K/UL — SIGNIFICANT CHANGE UP (ref 130–400)
PMV BLD: 11.7 FL — HIGH (ref 7.4–10.4)
POTASSIUM SERPL-MCNC: 4.2 MMOL/L — SIGNIFICANT CHANGE UP (ref 3.5–5)
POTASSIUM SERPL-SCNC: 4.2 MMOL/L — SIGNIFICANT CHANGE UP (ref 3.5–5)
PROT SERPL-MCNC: 7.1 G/DL — SIGNIFICANT CHANGE UP (ref 6–8)
PROTHROM AB SERPL-ACNC: 11.8 SEC — SIGNIFICANT CHANGE UP (ref 9.95–12.87)
RBC # BLD: 4.8 M/UL — SIGNIFICANT CHANGE UP (ref 4.2–5.4)
RBC # FLD: 14.6 % — HIGH (ref 11.5–14.5)
SODIUM SERPL-SCNC: 139 MMOL/L — SIGNIFICANT CHANGE UP (ref 135–146)
WBC # BLD: 18.34 K/UL — HIGH (ref 4.8–10.8)
WBC # FLD AUTO: 18.34 K/UL — HIGH (ref 4.8–10.8)

## 2024-01-20 PROCEDURE — 99222 1ST HOSP IP/OBS MODERATE 55: CPT

## 2024-01-20 PROCEDURE — 72141 MRI NECK SPINE W/O DYE: CPT | Mod: 26

## 2024-01-20 RX ORDER — ALBUTEROL 90 UG/1
2 AEROSOL, METERED ORAL
Qty: 0 | Refills: 0 | DISCHARGE

## 2024-01-20 RX ORDER — CEFUROXIME AXETIL 250 MG
1 TABLET ORAL
Refills: 0 | DISCHARGE

## 2024-01-20 RX ORDER — PANTOPRAZOLE SODIUM 20 MG/1
40 TABLET, DELAYED RELEASE ORAL
Refills: 0 | Status: DISCONTINUED | OUTPATIENT
Start: 2024-01-20 | End: 2024-01-24

## 2024-01-20 RX ORDER — TRAMADOL HYDROCHLORIDE 50 MG/1
50 TABLET ORAL EVERY 8 HOURS
Refills: 0 | Status: DISCONTINUED | OUTPATIENT
Start: 2024-01-20 | End: 2024-01-23

## 2024-01-20 RX ORDER — METHOCARBAMOL 500 MG/1
750 TABLET, FILM COATED ORAL EVERY 8 HOURS
Refills: 0 | Status: DISCONTINUED | OUTPATIENT
Start: 2024-01-20 | End: 2024-01-23

## 2024-01-20 RX ORDER — MONTELUKAST 4 MG/1
10 TABLET, CHEWABLE ORAL AT BEDTIME
Refills: 0 | Status: DISCONTINUED | OUTPATIENT
Start: 2024-01-20 | End: 2024-01-24

## 2024-01-20 RX ORDER — ACETAMINOPHEN 500 MG
650 TABLET ORAL EVERY 6 HOURS
Refills: 0 | Status: DISCONTINUED | OUTPATIENT
Start: 2024-01-20 | End: 2024-01-20

## 2024-01-20 RX ORDER — TRAMADOL HYDROCHLORIDE 50 MG/1
50 TABLET ORAL EVERY 8 HOURS
Refills: 0 | Status: DISCONTINUED | OUTPATIENT
Start: 2024-01-20 | End: 2024-01-20

## 2024-01-20 RX ORDER — LORATADINE 10 MG/1
10 TABLET ORAL DAILY
Refills: 0 | Status: DISCONTINUED | OUTPATIENT
Start: 2024-01-20 | End: 2024-01-24

## 2024-01-20 RX ORDER — HEPARIN SODIUM 5000 [USP'U]/ML
5000 INJECTION INTRAVENOUS; SUBCUTANEOUS ONCE
Refills: 0 | Status: DISCONTINUED | OUTPATIENT
Start: 2024-01-20 | End: 2024-01-24

## 2024-01-20 RX ORDER — MORPHINE SULFATE 50 MG/1
1 CAPSULE, EXTENDED RELEASE ORAL ONCE
Refills: 0 | Status: DISCONTINUED | OUTPATIENT
Start: 2024-01-20 | End: 2024-01-20

## 2024-01-20 RX ORDER — GABAPENTIN 400 MG/1
800 CAPSULE ORAL THREE TIMES A DAY
Refills: 0 | Status: DISCONTINUED | OUTPATIENT
Start: 2024-01-20 | End: 2024-01-23

## 2024-01-20 RX ORDER — GABAPENTIN 400 MG/1
1 CAPSULE ORAL
Qty: 0 | Refills: 0 | DISCHARGE

## 2024-01-20 RX ORDER — NORTRIPTYLINE HYDROCHLORIDE 10 MG/1
25 CAPSULE ORAL AT BEDTIME
Refills: 0 | Status: DISCONTINUED | OUTPATIENT
Start: 2024-01-20 | End: 2024-01-24

## 2024-01-20 RX ORDER — MORPHINE SULFATE 50 MG/1
2 CAPSULE, EXTENDED RELEASE ORAL EVERY 6 HOURS
Refills: 0 | Status: DISCONTINUED | OUTPATIENT
Start: 2024-01-20 | End: 2024-01-20

## 2024-01-20 RX ORDER — TOPIRAMATE 25 MG
1 TABLET ORAL
Refills: 0 | DISCHARGE

## 2024-01-20 RX ORDER — GABAPENTIN 400 MG/1
600 CAPSULE ORAL EVERY 8 HOURS
Refills: 0 | Status: DISCONTINUED | OUTPATIENT
Start: 2024-01-20 | End: 2024-01-20

## 2024-01-20 RX ORDER — ACETAMINOPHEN 500 MG
650 TABLET ORAL EVERY 6 HOURS
Refills: 0 | Status: DISCONTINUED | OUTPATIENT
Start: 2024-01-20 | End: 2024-01-23

## 2024-01-20 RX ORDER — CITALOPRAM 10 MG/1
40 TABLET, FILM COATED ORAL DAILY
Refills: 0 | Status: DISCONTINUED | OUTPATIENT
Start: 2024-01-20 | End: 2024-01-20

## 2024-01-20 RX ORDER — CITALOPRAM 10 MG/1
40 TABLET, FILM COATED ORAL DAILY
Refills: 0 | Status: DISCONTINUED | OUTPATIENT
Start: 2024-01-21 | End: 2024-01-24

## 2024-01-20 RX ORDER — LIDOCAINE 4 G/100G
1 CREAM TOPICAL EVERY 24 HOURS
Refills: 0 | Status: DISCONTINUED | OUTPATIENT
Start: 2024-01-20 | End: 2024-01-24

## 2024-01-20 RX ORDER — TOPIRAMATE 25 MG
25 TABLET ORAL
Refills: 0 | Status: DISCONTINUED | OUTPATIENT
Start: 2024-01-20 | End: 2024-01-24

## 2024-01-20 RX ADMIN — Medication 650 MILLIGRAM(S): at 07:59

## 2024-01-20 RX ADMIN — Medication 150 MILLIGRAM(S): at 06:43

## 2024-01-20 RX ADMIN — Medication 150 MILLIGRAM(S): at 13:50

## 2024-01-20 RX ADMIN — LIDOCAINE 1 PATCH: 4 CREAM TOPICAL at 07:59

## 2024-01-20 RX ADMIN — LIDOCAINE 1 PATCH: 4 CREAM TOPICAL at 06:44

## 2024-01-20 RX ADMIN — Medication 25 MILLIGRAM(S): at 06:43

## 2024-01-20 RX ADMIN — NORTRIPTYLINE HYDROCHLORIDE 25 MILLIGRAM(S): 10 CAPSULE ORAL at 21:44

## 2024-01-20 RX ADMIN — METHOCARBAMOL 750 MILLIGRAM(S): 500 TABLET, FILM COATED ORAL at 16:38

## 2024-01-20 RX ADMIN — MORPHINE SULFATE 1 MILLIGRAM(S): 50 CAPSULE, EXTENDED RELEASE ORAL at 17:18

## 2024-01-20 RX ADMIN — Medication 650 MILLIGRAM(S): at 06:44

## 2024-01-20 RX ADMIN — TRAMADOL HYDROCHLORIDE 50 MILLIGRAM(S): 50 TABLET ORAL at 21:46

## 2024-01-20 RX ADMIN — TRAMADOL HYDROCHLORIDE 50 MILLIGRAM(S): 50 TABLET ORAL at 11:30

## 2024-01-20 RX ADMIN — GABAPENTIN 800 MILLIGRAM(S): 400 CAPSULE ORAL at 16:38

## 2024-01-20 RX ADMIN — MONTELUKAST 10 MILLIGRAM(S): 4 TABLET, CHEWABLE ORAL at 21:44

## 2024-01-20 RX ADMIN — Medication 25 MILLIGRAM(S): at 17:19

## 2024-01-20 RX ADMIN — Medication 150 MILLIGRAM(S): at 21:44

## 2024-01-20 NOTE — CHART NOTE - NSCHARTNOTEFT_GEN_A_CORE
Patients case discussed and MRI imaging reviewed by Dr Fernandez.  < from: MR Cervical Spine No Cont (01.20.24 @ 12:25) >    IMPRESSION:  1.  Multilevel degenerative changes, not significantly changed since the   previous cervical spine MRI dated 2/14/2023.  2.  Mild spinal stenosis at C3-4, C4-5 and C5-6 with mild ventral spinal   cord abutment.  3.  Neuroforaminal stenosis at C4-5 and C5-6 (most pronounced on the   right at C5-6).  < end of copied text >    There is no significant changes in the patients MRI findings and they represent chronic condition. Pt has stable neurologic exam.  From Neurosurgical perspective, pt can be discharged and follow up closely with Dr Hester at [address] [place] [address] Ave 2 weeks after discharge.  [phone].

## 2024-01-20 NOTE — H&P ADULT - HISTORY OF PRESENT ILLNESS
38F w with history of anxiety and depression as well as chronic neck pain status post work-related injury 2019.  With subsequent decompression surgery C5-C6 which failed and now goes to pain management for periodic epidurals today with constant burning shooting pain from right side of neck down right arm x 3 days which began right after a cervical epidural procedure.  Patient states her pain was but now it is constant, unrelieved with her normal pain medications of Lyrica Topamax nortriptyline and tizanidine.    Denies fevers or chills or weakness      in ed  wbc 15   38F w with history of anxiety and depression as well as chronic neck pain status post work-related injury 2019.  With subsequent decompression surgery C5-C6 which failed and now goes to pain management for periodic epidurals today with constant burning shooting pain from right side of neck down right arm x 3 days which began right after a cervical epidural procedure.  Patient states her pain was but now it is constant, unrelieved with her normal pain medications of Lyrica Topamax nortriptyline and tizanidine.    Denies fevers or chills or weakness      in ed  VS stable  wbc 15      sp dexa + ketorolac + morphine 6

## 2024-01-20 NOTE — PATIENT PROFILE ADULT - NSPROMEDSADMININFO_GEN_A_NUR
Ankle fracture Ankle fracture Closed fracture of left ankle, initial encounter Ankle fracture no concerns

## 2024-01-20 NOTE — PATIENT PROFILE ADULT - IS THERE A SUSPICION OF ABUSE/NEGLIGENCE?
Procedure(s):  COLONOSCOPY  ENDOSCOPIC POLYPECTOMY  RESOLUTION CLIP. Anesthesia Post Evaluation        Comments: Post-Anesthesia Evaluation and Assessment    I have evaluated the patient and they are ready for PACU discharge. Patient: Jennifer Kim MRN: [id number]  SSN: [id number]   YOB: 1944  Age: 66 y.o. Sex: male      Cardiovascular Function/Vital Signs  /52   Pulse 60   Temp 36.7 °C (98 °F)   Resp 15   Ht 5' 10\" (1.778 m)   Wt 77.6 kg (171 lb)   SpO2 100%   BMI 24.54 kg/m²     Patient is status post MAC anesthesia for Procedure(s):  COLONOSCOPY  ENDOSCOPIC POLYPECTOMY  RESOLUTION CLIP. Nausea/Vomiting: None    Postoperative hydration reviewed and adequate. Pain:  Pain Scale 1: Numeric (0 - 10) (02/13/19 1329)  Pain Intensity 1: 0 (02/13/19 1329)   Managed    Neurological Status: At baseline    Mental Status, Level of Consciousness: Alert and  oriented to person, place, and time    Pulmonary Status:   O2 Device: Nasal cannula (02/13/19 1349)   Adequate oxygenation and airway patent    Complications related to anesthesia: None    Post-anesthesia assessment completed.  No concerns    Signed By: Jenn Mahan MD    February 13, 2019                   Visit Vitals  /52   Pulse 60   Temp 36.7 °C (98 °F)   Resp 15   Ht 5' 10\" (1.778 m)   Wt 77.6 kg (171 lb)   SpO2 100%   BMI 24.54 kg/m²
no

## 2024-01-20 NOTE — PHARMACOTHERAPY INTERVENTION NOTE - COMMENTS
Dr. Lambert was contacted via teams to clarify duplication of Robaxin and Tizanidine ordered by dr. Frausto.  AS per dr. Lambert pt is covered by Robaxin and to d/c Tizanidine.

## 2024-01-20 NOTE — H&P ADULT - ATTENDING COMMENTS
Patient is a 39 y/o Female  with PMH of anxiety and depression as well as chronic neck pain status post work-related injury 2019.  With subsequent decompression surgery C5-C6 which failed and now goes to pain management for periodic epidurals. c/w constant burning shooting pain from right side of neck down right arm x 3 days which began right after a cervical epidural procedure.     Vital Signs Last 24 Hrs  T(C): 35.8 (20 Jan 2024 13:35), Max: 36.6 (20 Jan 2024 00:28)  T(F): 96.5 (20 Jan 2024 13:35), Max: 97.8 (20 Jan 2024 00:28)  HR: 95 (20 Jan 2024 13:35) (93 - 114)  BP: 135/84 (20 Jan 2024 13:35) (116/59 - 162/107)  BP(mean): 81 (20 Jan 2024 05:00) (81 - 103)  RR: 18 (20 Jan 2024 05:00) (18 - 18)  SpO2: 97% (20 Jan 2024 05:00) (97% - 97%)  O2 Parameters below as of 20 Jan 2024 05:00  Patient On (Oxygen Delivery Method): room air    GENERAL: NAD, AAOX3, patient is laying comfortably in bed  HEENT: AT, NC  Spine: C spine tenderness with radiation to RUE  LUNG: CTA B/L  CVS: normal S1, S2, RRR, NO M/G/R  ABDOMEN: soft, bowel sounds present, normoactive in all 4 quadrants, non-tender, non-distended  EXT: no E/C/C, positive PP b/l extremities  NEURO: mild right upper ext paresthesia, no acute focal neurological deficits  SKIN: no rash, no ecchymosis      A/P:  #right cervical radiculopathy s/p ALONZO and moderate foraminal stenosis C4-6.  sp cervical epidural procedure  - s/p MRI of C-Spine: < from: MR Cervical Spine No Cont (01.20.24 @ 12:25) > IMPRESSION:  1.  Multilevel degenerative changes, not significantly changed since the previous cervical spine MRI dated 2/14/2023.  2.  Mild spinal stenosis at C3-4, C4-5 and C5-6 with mild ventral spinal cord abutment.  3.  Neuroforaminal stenosis at C4-5 and C5-6 (most pronounced on the right at C5-6).  --- End of Report --- < end of copied text >  - continue pain regimen tx.   - Neurosurgical team f/up   - most likely patient will need outpatient Pain management f/up with Neurosurgical f/up       #anxiety/#depression- stable,  continue  home meds        Total time spent to complete patient's bedside assessment, review medical chart, discuss medical plan of care with covering medical team was more than 55 minutes with >50% of time spent face to face with patient, discussion with patient/family and/or coordination of care

## 2024-01-20 NOTE — PATIENT PROFILE ADULT - FALL HARM RISK - HARM RISK INTERVENTIONS

## 2024-01-20 NOTE — H&P ADULT - ASSESSMENT
38F w with history of anxiety and depression as well as chronic neck pain status post work-related injury 2019.  With subsequent decompression surgery C5-C6 which failed and now goes to pain management for periodic epidurals today with constant burning shooting pain from right side of neck down right arm x 3 days which began right after a cervical epidural procedure.     confirmed medrec w pt at bedside    #right cervical radiculopathy s/p ALONZO and moderate foraminal stenosis C4-6.  #poss compression  sp cervical epidural procedure  seen by NSx  - fu MR Cspine  - cont home topamax   lyrica       #anxiety  #depression  - cont home meds    dvt ppx: heparin 1 dose  GI ppx: prootnix  diet: NPO till NSx fu in am  activity: AAT 38F w with history of anxiety and depression as well as chronic neck pain status post work-related injury 2019.  With subsequent decompression surgery C5-C6 which failed and now goes to pain management for periodic epidurals today with constant burning shooting pain from right side of neck down right arm x 3 days which began right after a cervical epidural procedure.     confirmed medrec w pt at bedside    #right cervical radiculopathy s/p ALONZO and moderate foraminal stenosis C4-6.  #poss compression  sp cervical epidural procedure  seen by NSx  - fu  Cspine  - cont home topamax   lyrica     - gabapentin 600 q8 + robaxin 750 q8 prn + morphine 2 q6 prn sev pain + tramadol 50 q8 prn mod pain + standing tylenol + lidocaine patch    #anxiety  #depression  - cont home meds    dvt ppx: heparin 1 dose  GI ppx: prootnix  diet: NPO till NSx fu in am  activity: AAT
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
## 2024-01-21 LAB — ERYTHROCYTE [SEDIMENTATION RATE] IN BLOOD: 15 MM/HR — SIGNIFICANT CHANGE UP (ref 0–20)

## 2024-01-21 PROCEDURE — 99232 SBSQ HOSP IP/OBS MODERATE 35: CPT

## 2024-01-21 RX ADMIN — Medication 150 MILLIGRAM(S): at 13:38

## 2024-01-21 RX ADMIN — MONTELUKAST 10 MILLIGRAM(S): 4 TABLET, CHEWABLE ORAL at 21:38

## 2024-01-21 RX ADMIN — METHOCARBAMOL 750 MILLIGRAM(S): 500 TABLET, FILM COATED ORAL at 10:13

## 2024-01-21 RX ADMIN — Medication 150 MILLIGRAM(S): at 05:26

## 2024-01-21 RX ADMIN — TRAMADOL HYDROCHLORIDE 50 MILLIGRAM(S): 50 TABLET ORAL at 21:40

## 2024-01-21 RX ADMIN — Medication 25 MILLIGRAM(S): at 17:08

## 2024-01-21 RX ADMIN — METHOCARBAMOL 750 MILLIGRAM(S): 500 TABLET, FILM COATED ORAL at 18:43

## 2024-01-21 RX ADMIN — GABAPENTIN 800 MILLIGRAM(S): 400 CAPSULE ORAL at 18:43

## 2024-01-21 RX ADMIN — Medication 150 MILLIGRAM(S): at 21:38

## 2024-01-21 RX ADMIN — CITALOPRAM 40 MILLIGRAM(S): 10 TABLET, FILM COATED ORAL at 11:56

## 2024-01-21 RX ADMIN — GABAPENTIN 800 MILLIGRAM(S): 400 CAPSULE ORAL at 08:48

## 2024-01-21 RX ADMIN — Medication 25 MILLIGRAM(S): at 05:26

## 2024-01-21 RX ADMIN — NORTRIPTYLINE HYDROCHLORIDE 25 MILLIGRAM(S): 10 CAPSULE ORAL at 21:39

## 2024-01-21 NOTE — PROGRESS NOTE ADULT - SUBJECTIVE AND OBJECTIVE BOX
NIKKO CLARK  [facility]-N 3E 006 [address] (SI-N 3E)        Patient was evaluated and examined  by bedside, c/o persistent posterior neck/upper back pain with rad. to right arm          REVIEW OF SYSTEMS:  please see pertinent positives mentioned above, all other 12 ROS negative      T(C): , Max: 36.1 (01-20-24 @ 20:20)  HR: 71 (01-21-24 @ 04:34)  BP: 121/82 (01-21-24 @ 04:34)  RR: 18 (01-21-24 @ 04:34)  SpO2: --  CAPILLARY BLOOD GLUCOSE          PHYSICAL EXAM:  General: NAD, AAOX3, patient is laying comfortably in bed  HEENT: AT, NC, Supple, NO JVD, NO CB  Lungs: CTA B/L, no wheezing, no rhonchi  CVS: normal S1, S2, RRR, NO M/G/R  Abdomen: soft, bowel sounds present, non-tender, non-distended  Extremities: no edema, no clubbing, no cyanosis, positive peripheral pulses b/l  Neuro: no acute focal neurological deficits, right upper ext mild paresthesia, decreased ROM of neck due to pain  Skin: no rash, no ecchymosis      LAB  CBC  Date: 01-20-24 @ 08:18  Mean cell Rixfvjxmsa82.3  Mean cell Hemoglobin Conc32.9  Mean cell Volum 86.0  Platelet count-Automate 316  RBC Count 4.80  Red Cell Distrib Width14.6  WBC Count18.34  % Albumin, Urine--  Hematocrit 41.3  Hemoglobin 13.6  CBC  Date: 01-19-24 @ 15:34  Mean cell Xarycjqnol24.0  Mean cell Hemoglobin Conc32.5  Mean cell Volum 86.3  Platelet count-Automate 300  RBC Count 4.96  Red Cell Distrib Width14.7  WBC Count15.62  % Albumin, Urine--  Hematocrit 42.8  Hemoglobin 13.9    BMP  01-20-24 @ 08:18  Blood Gas Arterial-Calcium,Ionized--  Blood Urea Nitrogen, Serum 14 mg/dL [10 - 20]  Carbon Dioxide, Serum21 mmol/L [17 - 32]  Chloride, Uasqh411 mmol/L [98 - 110]  Creatinie, Serum0.7 mg/dL [0.7 - 1.5]  Glucose, Lefsf869 mg/dL<H> [70 - 99]  Potassium, Serum4.2 mmol/L [3.5 - 5.0]  Sodium, Serum 139 mmol/L [135 - 146]  [facility]  01-19-24 @ 15:34  Blood Gas Arterial-Calcium,Ionized--  Blood Urea Nitrogen, Serum 10 mg/dL [10 - 20]  Carbon Dioxide, Serum23 mmol/L [17 - 32]  Chloride, Gytja608 mmol/L [98 - 110]  Creatinie, Serum0.8 mg/dL [0.7 - 1.5]  Glucose, Serum72 mg/dL [70 - 99]  Potassium, Serum5.2 mmol/L<H> [3.5 - 5.0] [Hemolyzed. Interpret with caution]  Sodium, Serum 139 mmol/L [135 - 146]        PT/INR - ( 20 Jan 2024 08:18 )   PT: 11.80 sec;   INR: 1.03 ratio         PTT - ( 20 Jan 2024 08:18 )  PTT:32.4 sec      Medications:  acetaminophen     Tablet .. 650 milliGRAM(s) Oral every 6 hours PRN  citalopram 40 milliGRAM(s) Oral daily  gabapentin 800 milliGRAM(s) Oral three times a day PRN  heparin   Injectable 5000 Unit(s) SubCutaneous once  lidocaine   4% Patch 1 Patch Transdermal every 24 hours  loratadine 10 milliGRAM(s) Oral daily  methocarbamol 750 milliGRAM(s) Oral every 8 hours PRN  montelukast 10 milliGRAM(s) Oral at bedtime  nortriptyline 25 milliGRAM(s) Oral at bedtime  pantoprazole    Tablet 40 milliGRAM(s) Oral before breakfast  pregabalin 150 milliGRAM(s) Oral three times a day  topiramate 25 milliGRAM(s) Oral two times a day  traMADol 50 milliGRAM(s) Oral every 8 hours PRN        Assessment and Plan:  Patient is a 39 y/o Female  with PMH of anxiety and depression as well as chronic neck pain status post work-related injury 2019.  With subsequent decompression surgery C5-C6 which failed and now goes to pain management for periodic epidurals. c/w constant burning shooting pain from right side of neck down right arm x 3 days which began right after a cervical epidural procedure.       #right cervical radiculopathy s/p ALONZO and moderate foraminal stenosis C4-6.  sp cervical epidural procedure  - s/p MRI of C-Spine: < from: MR Cervical Spine No Cont (01.20.24 @ 12:25) > IMPRESSION:  1.  Multilevel degenerative changes, not significantly changed since the previous cervical spine MRI dated 2/14/2023.  2.  Mild spinal stenosis at C3-4, C4-5 and C5-6 with mild ventral spinal cord abutment.  3.  Neuroforaminal stenosis at C4-5 and C5-6 (most pronounced on the right at C5-6).  --- End of Report --- < end of copied text >  - continue pain regimen tx.   - Neurosurgical team f/up   - most likely patient will need outpatient Pain management f/up with Neurosurgical f/up       #anxiety/#depression- stable,  continue  home meds    #Progress Note Handoff: Pending Neurosurgical team f/up in am , patient requests to speak to Dr. Hester , than possible d/c home   Family discussion: current medical plan of therapy d/w pt. by bedside, she is in agreement with treatment plan Disposition: Home_ possibly tomorrow    Total time spent to complete patient's bedside assessment, review medical chart, discuss medical plan of care with covering medical team was more than 35 minutes with >50% of time spent face to face with patient, discussion with patient/family and/or coordination of care

## 2024-01-22 ENCOUNTER — TRANSCRIPTION ENCOUNTER (OUTPATIENT)
Age: 39
End: 2024-01-22

## 2024-01-22 PROCEDURE — 99231 SBSQ HOSP IP/OBS SF/LOW 25: CPT

## 2024-01-22 RX ORDER — TRAMADOL HYDROCHLORIDE 50 MG/1
1 TABLET ORAL
Qty: 10 | Refills: 0
Start: 2024-01-22 | End: 2024-01-26

## 2024-01-22 RX ORDER — ACETAMINOPHEN 500 MG
2 TABLET ORAL
Qty: 0 | Refills: 0 | DISCHARGE
Start: 2024-01-22

## 2024-01-22 RX ORDER — METHOCARBAMOL 500 MG/1
1 TABLET, FILM COATED ORAL
Qty: 15 | Refills: 0
Start: 2024-01-22 | End: 2024-01-26

## 2024-01-22 RX ORDER — LIDOCAINE 4 G/100G
1 CREAM TOPICAL
Qty: 7 | Refills: 0
Start: 2024-01-22 | End: 2024-01-28

## 2024-01-22 RX ADMIN — Medication 150 MILLIGRAM(S): at 14:44

## 2024-01-22 RX ADMIN — GABAPENTIN 800 MILLIGRAM(S): 400 CAPSULE ORAL at 06:23

## 2024-01-22 RX ADMIN — Medication 25 MILLIGRAM(S): at 06:08

## 2024-01-22 RX ADMIN — GABAPENTIN 800 MILLIGRAM(S): 400 CAPSULE ORAL at 14:44

## 2024-01-22 RX ADMIN — GABAPENTIN 800 MILLIGRAM(S): 400 CAPSULE ORAL at 21:23

## 2024-01-22 RX ADMIN — Medication 150 MILLIGRAM(S): at 06:08

## 2024-01-22 RX ADMIN — CITALOPRAM 40 MILLIGRAM(S): 10 TABLET, FILM COATED ORAL at 11:21

## 2024-01-22 RX ADMIN — MONTELUKAST 10 MILLIGRAM(S): 4 TABLET, CHEWABLE ORAL at 21:23

## 2024-01-22 RX ADMIN — NORTRIPTYLINE HYDROCHLORIDE 25 MILLIGRAM(S): 10 CAPSULE ORAL at 21:23

## 2024-01-22 RX ADMIN — Medication 150 MILLIGRAM(S): at 21:23

## 2024-01-22 RX ADMIN — METHOCARBAMOL 750 MILLIGRAM(S): 500 TABLET, FILM COATED ORAL at 22:59

## 2024-01-22 RX ADMIN — METHOCARBAMOL 750 MILLIGRAM(S): 500 TABLET, FILM COATED ORAL at 06:23

## 2024-01-22 RX ADMIN — METHOCARBAMOL 750 MILLIGRAM(S): 500 TABLET, FILM COATED ORAL at 14:44

## 2024-01-22 RX ADMIN — LORATADINE 10 MILLIGRAM(S): 10 TABLET ORAL at 11:20

## 2024-01-22 RX ADMIN — TRAMADOL HYDROCHLORIDE 50 MILLIGRAM(S): 50 TABLET ORAL at 10:45

## 2024-01-22 RX ADMIN — TRAMADOL HYDROCHLORIDE 50 MILLIGRAM(S): 50 TABLET ORAL at 18:50

## 2024-01-22 RX ADMIN — Medication 25 MILLIGRAM(S): at 17:32

## 2024-01-22 NOTE — PROGRESS NOTE ADULT - ATTENDING COMMENTS
Patient is a 37 y/o Female  with PMH of anxiety and depression as well as chronic neck pain status post work-related injury 2019.  With subsequent decompression surgery C5-C6 which failed and now goes to pain management for periodic epidurals. c/w constant burning shooting pain from right side of neck down right arm x 3 days which began right after a cervical epidural procedure.       #right cervical radiculopathy s/p ALONZO and moderate foraminal stenosis C4-6.  sp cervical epidural procedure  - s/p MRI of C-Spine: < from: MR Cervical Spine No Cont (01.20.24 @ 12:25) > IMPRESSION:  1.  Multilevel degenerative changes, not significantly changed since the previous cervical spine MRI dated 2/14/2023.  2.  Mild spinal stenosis at C3-4, C4-5 and C5-6 with mild ventral spinal cord abutment.  3.  Neuroforaminal stenosis at C4-5 and C5-6 (most pronounced on the right at C5-6).  --- End of Report --- < end of copied text >  - continue pain regimen tx.   - Neurosurgical team f/up   - most likely patient will need outpatient Pain management f/up with Neurosurgical f/up       #anxiety/#depression- stable,  continue  home meds    #Progress Note Handoff: Pending Neurosurgical team f/up today  , patient requests to speak to Dr. Hester , than possible d/c home   Family discussion: current medical plan of therapy d/w pt. by bedside, she is in agreement with treatment plan Disposition: Home_ possibly today    Total time spent to complete patient's bedside assessment, review medical chart, discuss medical plan of care with covering medical team was more than 35 minutes with >50% of time spent face to face with patient, discussion with patient/family and/or coordination of care

## 2024-01-22 NOTE — DISCHARGE NOTE PROVIDER - NSDCMRMEDTOKEN_GEN_ALL_CORE_FT
citalopram 40 mg oral tablet: 1 tab(s) orally once a day (at bedtime)  Lyrica 150 mg oral capsule: 125 milligram(s) orally 3 times a day  montelukast 10 mg oral tablet: 1 tab(s) orally once a day (at bedtime)  nortriptyline 25 mg oral capsule: 1 cap(s) orally once a day (at bedtime)  tiZANidine 4 mg oral tablet: 1 tab(s) orally once a day (at bedtime)  Topamax 25 mg oral tablet: 1 tab(s) orally 2 times a day  Xyzal 5 mg oral tablet: 1 tab(s) orally once a day   acetaminophen 325 mg oral tablet: 2 tab(s) orally every 6 hours As needed Mild Pain (1 - 3)  citalopram 40 mg oral tablet: 1 tab(s) orally once a day (at bedtime)  lidocaine 4% topical film: Apply topically to affected area once a day  Lyrica 150 mg oral capsule: 125 milligram(s) orally 3 times a day  methocarbamol 750 mg oral tablet: 1 tab(s) orally every 8 hours as needed for Muscle Spasm MDD: 3  montelukast 10 mg oral tablet: 1 tab(s) orally once a day (at bedtime)  nortriptyline 25 mg oral capsule: 1 cap(s) orally once a day (at bedtime)  tiZANidine 4 mg oral tablet: 1 tab(s) orally once a day (at bedtime)  Topamax 25 mg oral tablet: 1 tab(s) orally 2 times a day  traMADol 50 mg oral tablet: 1 tab(s) orally 2 times a day as needed for Severe Pain (7 - 10) MDD: 2  Xyzal 5 mg oral tablet: 1 tab(s) orally once a day   acetaminophen 500 mg oral tablet: 2 tab(s) orally every 8 hours  citalopram 40 mg oral tablet: 1 tab(s) orally once a day (at bedtime)  gabapentin 800 mg oral tablet: 1 tab(s) orally once a day (at bedtime)  lidocaine 4% topical film: Apply topically to affected area once a day  Lyrica 150 mg oral capsule: 1 cap(s) orally every 8 hours  methocarbamol 750 mg oral tablet: 1 tab(s) orally every 6 hours  montelukast 10 mg oral tablet: 1 tab(s) orally once a day (at bedtime)  nortriptyline 25 mg oral capsule: 1 cap(s) orally once a day (at bedtime)  polyethylene glycol 3350 oral powder for reconstitution: 17 gram(s) orally 2 times a day  senna leaf extract oral tablet: 2 tab(s) orally once a day (at bedtime)  Topamax 25 mg oral tablet: 1 tab(s) orally 2 times a day  Xyzal 5 mg oral tablet: 1 tab(s) orally once a day   citalopram 40 mg oral tablet: 1 tab(s) orally once a day (at bedtime)  gabapentin 800 mg oral tablet: 1 tab(s) orally once a day (at bedtime)  lidocaine 4% topical film: Apply topically to affected area once a day  Lyrica 150 mg oral capsule: 1 cap(s) orally every 8 hours  methocarbamol 750 mg oral tablet: 1 tab(s) orally every 6 hours as needed for  moderate pain  methylPREDNISolone 4 mg oral tablet: 4 milligram(s) orally 1 to 4 times a day 1st day: Take 2 tablets before breakfast, 1 tablet after lunch and after supper, and 2 tablets at bedtime (6 total)  2nd day: Take 1 tablet before breakfast, 1 tablet after lunch and after supper, and 2 tablets at bedtime (5 total)  3rd day: Take 1 tablet before breakfast, and 1 tablet after lunch, after supper and at bedtime (4 total)  4th day: Take 1 tablet before breakfast, after lunch, and at bedtime (3 total)  5th day: Take 1 tablet before breakfast, and at bedtime (2 total)   6th day: Take 1 tablet before breakfast (1 total)  montelukast 10 mg oral tablet: 1 tab(s) orally once a day (at bedtime)  nortriptyline 25 mg oral capsule: 1 cap(s) orally once a day (at bedtime)  polyethylene glycol 3350 oral powder for reconstitution: 17 gram(s) orally 2 times a day  senna leaf extract oral tablet: 2 tab(s) orally once a day (at bedtime)  Topamax 25 mg oral tablet: 1 tab(s) orally 2 times a day  Xyzal 5 mg oral tablet: 1 tab(s) orally once a day

## 2024-01-22 NOTE — CONSULT NOTE ADULT - SUBJECTIVE AND OBJECTIVE BOX
PAIN MANAGEMENT CONSULT NOTE    Chief Complaint:    HPI:  38F w with history of anxiety and depression as well as chronic neck pain status post work-related injury 2019.  With subsequent decompression surgery C5-C6 which failed and now goes to pain management for periodic epidurals today with constant burning shooting pain from right side of neck down right arm x 3 days which began right after a cervical epidural procedure.  Patient states her pain was but now it is constant, unrelieved with her normal pain medications of Lyrica Topamax nortriptyline and tizanidine.    Denies fevers or chills or weakness    patient recommendations based on chart review      PAST MEDICAL & SURGICAL HISTORY:  Anxiety and depression      H/O migraine      History of posttraumatic stress disorder (PTSD)      Cervical spondylosis with radiculopathy      Asthma, intermittent      History of cholecystectomy      H/O knee surgery      H/O rotator cuff surgery      Previous back surgery          FAMILY HISTORY:  FH: lung cancer (Father)    FH: diabetes mellitus (Mother)    FH: hypercholesterolemia (Mother)        SOCIAL HISTORY:  [ ] Denies Smoking, Alcohol, or Drug Use    HOME MEDICATIONS:   Please refer to initial HNP    PAIN HOME MEDICATIONS:    Allergies    [This allergen will not trigger allergy alert] Sulfur (Unknown)  [This allergen will not trigger allergy alert] Sulfamethoxazole / Trimethoprim  Reaction: Unknown (Unknown)  iodine (Other (Severe))  [This allergen will not trigger allergy alert] Iodinated Contrast Media (Unknown)  [This allergen will not trigger allergy alert] gadolinium (Unknown)  sulfa drugs (Anaphylaxis)  sulfasalazine (Unknown)    Intolerances        PAIN MEDICATIONS:  acetaminophen     Tablet .. 650 milliGRAM(s) Oral every 6 hours PRN  citalopram 40 milliGRAM(s) Oral daily  gabapentin 800 milliGRAM(s) Oral three times a day PRN  methocarbamol 750 milliGRAM(s) Oral every 8 hours PRN  nortriptyline 25 milliGRAM(s) Oral at bedtime  pregabalin 150 milliGRAM(s) Oral three times a day  topiramate 25 milliGRAM(s) Oral two times a day  traMADol 50 milliGRAM(s) Oral every 8 hours PRN    Heme:  heparin   Injectable 5000 Unit(s) SubCutaneous once    Antibiotics:    Cardiovascular:    GI:  pantoprazole    Tablet 40 milliGRAM(s) Oral before breakfast    Endocrine:    All Other Medications:  lidocaine   4% Patch 1 Patch Transdermal every 24 hours      Vital Signs Last 24 Hrs  T(C): 36.3 (22 Jan 2024 13:42), Max: 36.3 (22 Jan 2024 13:42)  T(F): 97.4 (22 Jan 2024 13:42), Max: 97.4 (22 Jan 2024 13:42)  HR: 106 (22 Jan 2024 13:42) (78 - 106)  BP: 105/60 (22 Jan 2024 13:42) (105/60 - 108/72)  BP(mean): 86 (22 Jan 2024 04:48) (86 - 86)  RR: 18 (22 Jan 2024 13:42) (18 - 18)  SpO2: 98% (22 Jan 2024 13:42) (98% - 98%)    Parameters below as of 22 Jan 2024 13:42  Patient On (Oxygen Delivery Method): room air        LABS:      Rads  MR C spine    IMPRESSION:    1.  Multilevel degenerative changes, not significantly changed since the   previous cervical spine MRI dated 2/14/2023.    2.  Mild spinal stenosis at C3-4, C4-5 and C5-6 with mild ventral spinal   cord abutment.    3.  Neuroforaminal stenosis at C4-5 and C5-6 (most pronounced on the   right at C5-6).      ASSESSMENT:   HPI:  38F w with history of anxiety and depression as well as chronic neck pain status post work-related injury 2019.  With subsequent decompression surgery C5-C6 which failed and now goes to pain management for periodic epidurals today with constant burning shooting pain from right side of neck down right arm x 3 days which began right after a cervical epidural procedure.  Patient states her pain was but now it is constant, unrelieved with her normal pain medications of Lyrica Topamax nortriptyline and tizanidine.    Denies fevers or chills or weakness    Acute on chronic neck pain with radicular symptoms    Recommendations  1. acetaminophen 1000mg q8h standing  2. pregabalin 150mg q8h standing  3. gabapentin 800 qHS for 2 nights, then stop  4. methocarbamol 750mg q6h standing  5. nortriptyline 25mg qHS  6. Tramadol 50mg q4h PRN for pain   7. f/u o/p for ALONZO (Pt sees Dr. carlton)    Bowel regimen: miralax / colace  Nausea ppx: zofran standing  Functional goals: oob-chair, ambulate PRN as per primary team  Physical therapy

## 2024-01-22 NOTE — PROGRESS NOTE ADULT - ASSESSMENT
38F w with history of anxiety and depression as well as chronic neck pain status post work-related injury 2019.  With subsequent decompression surgery C5-C6 which failed and now goes to pain management for periodic epidurals today with constant burning shooting pain from right side of neck down right arm x 3 days which began right after a cervical epidural procedure.     #right cervical radiculopathy s/p ALONZO and moderate foraminal stenosis C4-6.  #poss compression  sp cervical epidural procedure  seen by NSx  - MR spine noted  - cont pain managment  - Nsx: outpatient with Dr Hester 2 weeks after DC    #anxiety  #depression  - cont home meds    dvt ppx: heparin 1 dose  GI ppx: prootnix  diet: regular  activity: AAT

## 2024-01-22 NOTE — DISCHARGE NOTE PROVIDER - PROVIDER TOKENS
PROVIDER:[TOKEN:[[id number]],FOLLOWUP:[1 week]] PROVIDER:[TOKEN:[[id number]],FOLLOWUP:[1 week]],PROVIDER:[TOKEN:[[id number]],FOLLOWUP:[2 weeks]] PROVIDER:[TOKEN:[[id number]],FOLLOWUP:[1 week]],PROVIDER:[TOKEN:[[id number]],FOLLOWUP:[1 week]]

## 2024-01-22 NOTE — DISCHARGE NOTE PROVIDER - CARE PROVIDER_API CALL
Boo Soto NP in Family Health  [address] [place] [address]  Phone: [phone]  Fax: [phone]  Follow Up Time: 1 week   Boo Soto  NP in [address] [address]  Phone: [phone]  Fax: [phone]  Follow Up Time: 1 week    Tyler Chua  Physical/Rehab Medicine  [address] [place] [address]  Phone: [phone]  Fax: [phone]  Follow Up Time: 2 weeks   Boo Soto  NP in [address] [address]  Phone: [phone]  Fax: [phone]  Follow Up Time: 1 week    Laura Salcido  Anesthesiology  [address] [place] [address]  Phone: [phone]  Fax: [phone]  Follow Up Time: 1 week

## 2024-01-22 NOTE — DISCHARGE NOTE PROVIDER - NSDCFUSCHEDAPPT_GEN_ALL_CORE_FT
Jodi Hester  [facility]  NEUROSURG [address]  Scheduled Appointment: 02/01/2024    Marino Gibson  [facility]  ONCORTHO [address]  Scheduled Appointment: 04/11/2024

## 2024-01-22 NOTE — DISCHARGE NOTE PROVIDER - HOSPITAL COURSE
38F w with history of anxiety and depression as well as chronic neck pain status post work-related injury 2019.  With subsequent decompression surgery C5-C6 which failed and now goes to pain management for periodic epidurals today with constant burning shooting pain from right side of neck down right arm x 3 days which began right after a cervical epidural procedure.  Patient states her pain was but now it is constant, unrelieved with her normal pain medications of Lyrica Topamax nortriptyline and tizanidine.    Denies fevers or chills or weakness    # right cervical radiculopathy s/p ALONZO and moderate foraminal stenosis C4-6.  sp cervical epidural procedure  - s/p MRI of C-Spine: < from: MR Cervical Spine No Cont (01.20.24 @ 12:25) > IMPRESSION:  1.  Multilevel degenerative changes, not significantly changed since the previous cervical spine MRI dated 2/14/2023.  2.  Mild spinal stenosis at C3-4, C4-5 and C5-6 with mild ventral spinal cord abutment.  3.  Neuroforaminal stenosis at C4-5 and C5-6 (most pronounced on the right at C5-6).  --- End of Report --- < end of copied text >  - continue pain regimen tx.   - Neurosurgical team f/up   - will need outpatient Pain management f/up with Neurosurgical f/up     #anxiety  #depression  - stable, continue  home meds    Patient is stable for discharge home.    38F w with history of anxiety and depression as well as chronic neck pain status post work-related injury 2019.  With subsequent decompression surgery C5-C6 which failed and now goes to pain management for periodic epidurals today with constant burning shooting pain from right side of neck down right arm x 3 days which began right after a cervical epidural procedure.  Patient states her pain was but now it is constant, unrelieved with her normal pain medications of Lyrica Topamax nortriptyline and tizanidine.    Denies fevers or chills or weakness    # chronic right cervical radiculopathy s/p ALONZO and moderate foraminal stenosis C4-6.  sp cervical epidural procedure  - s/p MRI of C-Spine: < from: MR Cervical Spine No Cont (01.20.24 @ 12:25) > IMPRESSION:  1.  Multilevel degenerative changes, not significantly changed since the previous cervical spine MRI dated 2/14/2023.  2.  Mild spinal stenosis at C3-4, C4-5 and C5-6 with mild ventral spinal cord abutment.  3.  Neuroforaminal stenosis at C4-5 and C5-6 (most pronounced on the right at C5-6).  --- End of Report --- < end of copied text >  - continue pain regimen tx. as per Pain management team recommendations, all prescriptions were sent to the pharmacy.   - Neurosurgical team has evaluated the patient, recommended to complete right upper extremity doppler - which was negative for DVT,  no further inpatient tests or procedures needed during this inpatient stay. Patient was instructed to f/up with Neurosurgical specialist in next 2 weeks post d/c home   - will need outpatient Pain management f/up in 1 week post d/c home    #anxiety  #depression  - stable, continue  home meds    Patient is stable for discharge home.

## 2024-01-22 NOTE — PROGRESS NOTE ADULT - SUBJECTIVE AND OBJECTIVE BOX
24H events:    Patient is a 38y old Female who presents with a chief complaint of cervical spine pain with radiation to right upper extremity (21 Jan 2024 13:53)    Primary diagnosis of Intractable cervical neuropathic pain    Today is hospital day 3d. This morning patient was seen and examined at bedside, resting comfortably in bed.    No acute or major events overnight.    PAST MEDICAL & SURGICAL HISTORY  Anxiety and depression    H/O migraine    History of posttraumatic stress disorder (PTSD)    Cervical spondylosis with radiculopathy    Asthma, intermittent    History of cholecystectomy    H/O knee surgery    H/O rotator cuff surgery    Previous back surgery      SOCIAL HISTORY:  Social History:      ALLERGIES:  [This allergen will not trigger allergy alert] Sulfur (Unknown)  [This allergen will not trigger allergy alert] Sulfamethoxazole / Trimethoprim  Reaction: Unknown (Unknown)  iodine (Other (Severe))  [This allergen will not trigger allergy alert] Iodinated Contrast Media (Unknown)  [This allergen will not trigger allergy alert] gadolinium (Unknown)  sulfa drugs (Anaphylaxis)  sulfasalazine (Unknown)    MEDICATIONS:  STANDING MEDICATIONS  citalopram 40 milliGRAM(s) Oral daily  heparin   Injectable 5000 Unit(s) SubCutaneous once  lidocaine   4% Patch 1 Patch Transdermal every 24 hours  loratadine 10 milliGRAM(s) Oral daily  montelukast 10 milliGRAM(s) Oral at bedtime  nortriptyline 25 milliGRAM(s) Oral at bedtime  pantoprazole    Tablet 40 milliGRAM(s) Oral before breakfast  pregabalin 150 milliGRAM(s) Oral three times a day  topiramate 25 milliGRAM(s) Oral two times a day    PRN MEDICATIONS  acetaminophen     Tablet .. 650 milliGRAM(s) Oral every 6 hours PRN  gabapentin 800 milliGRAM(s) Oral three times a day PRN  methocarbamol 750 milliGRAM(s) Oral every 8 hours PRN  traMADol 50 milliGRAM(s) Oral every 8 hours PRN    VITALS:   T(F): 95.6  HR: 78  BP: 108/72  RR: 18  SpO2: --    PHYSICAL EXAM:  GENERAL:   (  ) NAD, lying in bed comfortably     (  ) obtunded     (  ) lethargic     (  ) somnolent    HEAD:   (  ) Atraumatic     (  ) hematoma     (  ) laceration (specify location:       )     NECK:  (  ) Supple     (  ) neck stiffness     (  ) nuchal rigidity     (  )  no JVD     (  ) JVD present ( -- cm)    HEART:  Rate -->     (  ) normal rate     (  ) bradycardic     (  ) tachycardic  Rhythm -->     (  ) regular     (  ) regularly irregular     (  ) irregularly irregular  Murmurs -->     (  ) normal s1s2     (  ) systolic murmur     (  ) diastolic murmur     (  ) continuous murmur      (  ) S3 present     (  ) S4 present    LUNGS:   (  )Unlabored respirations     (  ) tachypnea  (  ) B/L air entry     (  ) decreased breath sounds in:  (location     )    (  ) no adventitious sound     (  ) crackles     (  ) wheezing      (  ) rhonchi      (specify location:       )  (  ) chest wall tenderness (specify location:       )    ABDOMEN:   (  ) Soft     (  ) tense   |   (  ) nondistended     (  ) distended   |   (  ) +BS     (  ) hypoactive bowel sounds     (  ) hyperactive bowel sounds  (  ) nontender     (  ) RUQ tenderness     (  ) RLQ tenderness     (  ) LLQ tenderness     (  ) epigastric tenderness     (  ) diffuse tenderness  (  ) Splenomegaly      (  ) Hepatomegaly      (  ) Jaundice     (  ) ecchymosis     EXTREMITIES:  (  ) Normal     (  ) Rash     (  ) ecchymosis     (  ) varicose veins      (  ) pitting edema     (  ) non-pitting edema   (  ) ulceration     (  ) gangrene:     (location:     )    NERVOUS SYSTEM:    (  ) A&Ox3     (  ) confused     (  ) lethargic  CN II-XII:     (  ) Intact     (  ) deficits found     (Specify:     )   Upper extremities:     (  ) no sensorimotor deficits     (  ) weakness     (  ) loss of proprioception/vibration     (  ) loss of touch/temperature (specify:    )  Lower extremities:     (  ) no sensorimotor deficits     (  ) weakness     (  ) loss of proprioception/vibration     (  ) loss of touch/temperature (specify:    )    SKIN:   (  ) No rashes or lesions     (  ) maculopapular rash     (  ) pustules     (  ) vesicles     (  ) ulcer     (  ) ecchymosis     (specify location:     )    LABS:                        RADIOLOGY:           24H events:    Patient is a 38y old Female who presents with a chief complaint of cervical spine pain with radiation to right upper extremity (21 Jan 2024 13:53)    Primary diagnosis of Intractable cervical neuropathic pain    Today is hospital day 3d. This morning patient was seen and examined at bedside, resting comfortably in bed.    No acute or major events overnight. She endorses continued burning pain in RUE.     PAST MEDICAL & SURGICAL HISTORY  Anxiety and depression    H/O migraine    History of posttraumatic stress disorder (PTSD)    Cervical spondylosis with radiculopathy    Asthma, intermittent    History of cholecystectomy    H/O knee surgery    H/O rotator cuff surgery    Previous back surgery      SOCIAL HISTORY:  Social History:      ALLERGIES:  [This allergen will not trigger allergy alert] Sulfur (Unknown)  [This allergen will not trigger allergy alert] Sulfamethoxazole / Trimethoprim  Reaction: Unknown (Unknown)  iodine (Other (Severe))  [This allergen will not trigger allergy alert] Iodinated Contrast Media (Unknown)  [This allergen will not trigger allergy alert] gadolinium (Unknown)  sulfa drugs (Anaphylaxis)  sulfasalazine (Unknown)    MEDICATIONS:  STANDING MEDICATIONS  citalopram 40 milliGRAM(s) Oral daily  heparin   Injectable 5000 Unit(s) SubCutaneous once  lidocaine   4% Patch 1 Patch Transdermal every 24 hours  loratadine 10 milliGRAM(s) Oral daily  montelukast 10 milliGRAM(s) Oral at bedtime  nortriptyline 25 milliGRAM(s) Oral at bedtime  pantoprazole    Tablet 40 milliGRAM(s) Oral before breakfast  pregabalin 150 milliGRAM(s) Oral three times a day  topiramate 25 milliGRAM(s) Oral two times a day    PRN MEDICATIONS  acetaminophen     Tablet .. 650 milliGRAM(s) Oral every 6 hours PRN  gabapentin 800 milliGRAM(s) Oral three times a day PRN  methocarbamol 750 milliGRAM(s) Oral every 8 hours PRN  traMADol 50 milliGRAM(s) Oral every 8 hours PRN    VITALS:   T(F): 95.6  HR: 78  BP: 108/72  RR: 18  SpO2: --    PHYSICAL EXAM:  GENERAL:   (  ) NAD, lying in bed comfortably     (  ) obtunded     (  ) lethargic     (  ) somnolent    HEAD:   (  ) Atraumatic     (  ) hematoma     (  ) laceration (specify location:       )     NECK:  (  ) Supple     (  ) neck stiffness     (  ) nuchal rigidity     (  )  no JVD     (  ) JVD present ( -- cm)    HEART:  Rate -->     (  ) normal rate     (  ) bradycardic     (  ) tachycardic  Rhythm -->     (  ) regular     (  ) regularly irregular     (  ) irregularly irregular  Murmurs -->     (  ) normal s1s2     (  ) systolic murmur     (  ) diastolic murmur     (  ) continuous murmur      (  ) S3 present     (  ) S4 present    LUNGS:   (  )Unlabored respirations     (  ) tachypnea  (  ) B/L air entry     (  ) decreased breath sounds in:  (location     )    (  ) no adventitious sound     (  ) crackles     (  ) wheezing      (  ) rhonchi      (specify location:       )  (  ) chest wall tenderness (specify location:       )    ABDOMEN:   (  ) Soft     (  ) tense   |   (  ) nondistended     (  ) distended   |   (  ) +BS     (  ) hypoactive bowel sounds     (  ) hyperactive bowel sounds  (  ) nontender     (  ) RUQ tenderness     (  ) RLQ tenderness     (  ) LLQ tenderness     (  ) epigastric tenderness     (  ) diffuse tenderness  (  ) Splenomegaly      (  ) Hepatomegaly      (  ) Jaundice     (  ) ecchymosis     EXTREMITIES:  (  ) Normal     (  ) Rash     (  ) ecchymosis     (  ) varicose veins      (  ) pitting edema     (  ) non-pitting edema   (  ) ulceration     (  ) gangrene:     (location:     )    NERVOUS SYSTEM:    (  ) A&Ox3     (  ) confused     (  ) lethargic  CN II-XII:     (  ) Intact     (  ) deficits found     (Specify:     )   Upper extremities:     (  ) no sensorimotor deficits     (  ) weakness     (  ) loss of proprioception/vibration     (  ) loss of touch/temperature (specify:    )  Lower extremities:     (  ) no sensorimotor deficits     (  ) weakness     (  ) loss of proprioception/vibration     (  ) loss of touch/temperature (specify:    )    SKIN:   (  ) No rashes or lesions     (  ) maculopapular rash     (  ) pustules     (  ) vesicles     (  ) ulcer     (  ) ecchymosis     (specify location:     )    LABS:                        RADIOLOGY:

## 2024-01-22 NOTE — DISCHARGE NOTE PROVIDER - NSDCCPCAREPLAN_GEN_ALL_CORE_FT
PRINCIPAL DISCHARGE DIAGNOSIS  Diagnosis: Intractable cervical neuropathic pain  Assessment and Plan of Treatment: You were brought into the hospital for uncontrolled pain. You had an MRI which showed stable, chronic stenosis. You were evaluated by neurosurgery and they did not recommend any surgical intervention as there were no changes in the MRI and no compression of the spinal cord. Follow up closely with Dr Hester at [address] [place] [address] 2 weeks after discharge.  [phone]. Continue taking other medications and follow-up with your PCP. Return to the ED for uncontrolled pain, weakness, inability to urinate or fevers.     PRINCIPAL DISCHARGE DIAGNOSIS  Diagnosis: Intractable cervical neuropathic pain  Assessment and Plan of Treatment: You were brought into the hospital for uncontrolled pain. You had an MRI which showed stable, chronic stenosis. You were evaluated by neurosurgery and they did not recommend any surgical intervention as there were no changes in the MRI and no compression of the spinal cord. Follow up closely with Dr Hester at [address] [place] [address] 2 weeks after discharge.  [phone]. Continue taking all prescribed  medications as per Pain Management team recommendations  and follow-up with your PCP. You also had completed Right upper extremity venous doppler which was negative for DVT. Please schedule appointment with Pain management specialist in 1 week post discharge home. Be compliant with medical therapy.  Return to the ED for uncontrolled pain, weakness, inability to urinate or fevers.

## 2024-01-22 NOTE — CHART NOTE - NSCHARTNOTEFT_GEN_A_CORE
The pt has been seen and examined at the bedside together with Dr. Hester.  A&OX3, follows complex commands, MAEX4.       rad< from: MR Cervical Spine No Cont (01.20.24 @ 12:25) >    IMPRESSION:    1.  Multilevel degenerative changes, not significantly changed since the   previous cervical spine MRI dated 2/14/2023.    2.  Mild spinal stenosis at C3-4, C4-5 and C5-6 with mild ventral spinal   cord abutment.    3.  Neuroforaminal stenosis at C4-5 and C5-6 (most pronounced on the   right at C5-6).    --- End of Report ---    < end of copied text >        37 yo F with right cervical radiculopathy s/p ALONZO and moderate foraminal stenosis C4-6.  Plan:  - MRI C-spine (reviewed by Dr. Hester)  - Right UE Doppler to r/o DVT  - PM cs     Discussed w/attg

## 2024-01-22 NOTE — DISCHARGE NOTE PROVIDER - CARE PROVIDERS DIRECT ADDRESSES
,[email or web address]

## 2024-01-23 PROCEDURE — 99222 1ST HOSP IP/OBS MODERATE 55: CPT

## 2024-01-23 PROCEDURE — 99239 HOSP IP/OBS DSCHRG MGMT >30: CPT

## 2024-01-23 PROCEDURE — 93971 EXTREMITY STUDY: CPT | Mod: 26,RT

## 2024-01-23 RX ORDER — ACETAMINOPHEN 500 MG
1000 TABLET ORAL EVERY 8 HOURS
Refills: 0 | Status: DISCONTINUED | OUTPATIENT
Start: 2024-01-23 | End: 2024-01-24

## 2024-01-23 RX ORDER — METHOCARBAMOL 500 MG/1
1 TABLET, FILM COATED ORAL
Qty: 0 | Refills: 0 | DISCHARGE
Start: 2024-01-23

## 2024-01-23 RX ORDER — GABAPENTIN 400 MG/1
800 CAPSULE ORAL AT BEDTIME
Refills: 0 | Status: DISCONTINUED | OUTPATIENT
Start: 2024-01-23 | End: 2024-01-24

## 2024-01-23 RX ORDER — GABAPENTIN 400 MG/1
1 CAPSULE ORAL
Qty: 0 | Refills: 0 | DISCHARGE
Start: 2024-01-23

## 2024-01-23 RX ORDER — SENNA PLUS 8.6 MG/1
2 TABLET ORAL
Qty: 0 | Refills: 0 | DISCHARGE
Start: 2024-01-23

## 2024-01-23 RX ORDER — POLYETHYLENE GLYCOL 3350 17 G/17G
17 POWDER, FOR SOLUTION ORAL
Qty: 0 | Refills: 0 | DISCHARGE
Start: 2024-01-23

## 2024-01-23 RX ORDER — SENNA PLUS 8.6 MG/1
2 TABLET ORAL AT BEDTIME
Refills: 0 | Status: DISCONTINUED | OUTPATIENT
Start: 2024-01-23 | End: 2024-01-24

## 2024-01-23 RX ORDER — TRAMADOL HYDROCHLORIDE 50 MG/1
1 TABLET ORAL
Qty: 0 | Refills: 0 | DISCHARGE
Start: 2024-01-23

## 2024-01-23 RX ORDER — TRAMADOL HYDROCHLORIDE 50 MG/1
50 TABLET ORAL EVERY 4 HOURS
Refills: 0 | Status: DISCONTINUED | OUTPATIENT
Start: 2024-01-23 | End: 2024-01-24

## 2024-01-23 RX ORDER — POLYETHYLENE GLYCOL 3350 17 G/17G
17 POWDER, FOR SOLUTION ORAL
Refills: 0 | Status: DISCONTINUED | OUTPATIENT
Start: 2024-01-23 | End: 2024-01-24

## 2024-01-23 RX ORDER — METHOCARBAMOL 500 MG/1
1 TABLET, FILM COATED ORAL
Qty: 120 | Refills: 0
Start: 2024-01-23 | End: 2024-02-21

## 2024-01-23 RX ORDER — TIZANIDINE 4 MG/1
1 TABLET ORAL
Refills: 0 | DISCHARGE

## 2024-01-23 RX ORDER — METHOCARBAMOL 500 MG/1
750 TABLET, FILM COATED ORAL EVERY 6 HOURS
Refills: 0 | Status: DISCONTINUED | OUTPATIENT
Start: 2024-01-23 | End: 2024-01-24

## 2024-01-23 RX ADMIN — TRAMADOL HYDROCHLORIDE 50 MILLIGRAM(S): 50 TABLET ORAL at 06:17

## 2024-01-23 RX ADMIN — METHOCARBAMOL 750 MILLIGRAM(S): 500 TABLET, FILM COATED ORAL at 11:42

## 2024-01-23 RX ADMIN — GABAPENTIN 800 MILLIGRAM(S): 400 CAPSULE ORAL at 21:08

## 2024-01-23 RX ADMIN — Medication 150 MILLIGRAM(S): at 06:16

## 2024-01-23 RX ADMIN — Medication 25 MILLIGRAM(S): at 18:41

## 2024-01-23 RX ADMIN — METHOCARBAMOL 750 MILLIGRAM(S): 500 TABLET, FILM COATED ORAL at 18:41

## 2024-01-23 RX ADMIN — Medication 1000 MILLIGRAM(S): at 14:29

## 2024-01-23 RX ADMIN — MONTELUKAST 10 MILLIGRAM(S): 4 TABLET, CHEWABLE ORAL at 21:08

## 2024-01-23 RX ADMIN — LORATADINE 10 MILLIGRAM(S): 10 TABLET ORAL at 11:41

## 2024-01-23 RX ADMIN — TRAMADOL HYDROCHLORIDE 50 MILLIGRAM(S): 50 TABLET ORAL at 14:29

## 2024-01-23 RX ADMIN — PANTOPRAZOLE SODIUM 40 MILLIGRAM(S): 20 TABLET, DELAYED RELEASE ORAL at 06:16

## 2024-01-23 RX ADMIN — NORTRIPTYLINE HYDROCHLORIDE 25 MILLIGRAM(S): 10 CAPSULE ORAL at 21:08

## 2024-01-23 RX ADMIN — Medication 150 MILLIGRAM(S): at 14:29

## 2024-01-23 RX ADMIN — METHOCARBAMOL 750 MILLIGRAM(S): 500 TABLET, FILM COATED ORAL at 23:03

## 2024-01-23 RX ADMIN — CITALOPRAM 40 MILLIGRAM(S): 10 TABLET, FILM COATED ORAL at 11:41

## 2024-01-23 RX ADMIN — Medication 25 MILLIGRAM(S): at 06:17

## 2024-01-23 RX ADMIN — Medication 150 MILLIGRAM(S): at 21:08

## 2024-01-23 RX ADMIN — TRAMADOL HYDROCHLORIDE 50 MILLIGRAM(S): 50 TABLET ORAL at 14:59

## 2024-01-23 NOTE — PROGRESS NOTE ADULT - SUBJECTIVE AND OBJECTIVE BOX
24H events:    Patient is a 38y old Female who presents with a chief complaint of upper back pain/right upper extremity pain (22 Jan 2024 09:55)    Primary diagnosis of Intractable cervical neuropathic pain    Today is hospital day 4d. This morning patient was seen and examined at bedside, resting comfortably in bed.    No acute or major events overnight.    PAST MEDICAL & SURGICAL HISTORY  Anxiety and depression    H/O migraine    History of posttraumatic stress disorder (PTSD)    Cervical spondylosis with radiculopathy    Asthma, intermittent    History of cholecystectomy    H/O knee surgery    H/O rotator cuff surgery    Previous back surgery      SOCIAL HISTORY:  Social History:      ALLERGIES:  [This allergen will not trigger allergy alert] Sulfur (Unknown)  [This allergen will not trigger allergy alert] Sulfamethoxazole / Trimethoprim  Reaction: Unknown (Unknown)  iodine (Other (Severe))  [This allergen will not trigger allergy alert] Iodinated Contrast Media (Unknown)  [This allergen will not trigger allergy alert] gadolinium (Unknown)  sulfa drugs (Anaphylaxis)  sulfasalazine (Unknown)    MEDICATIONS:  STANDING MEDICATIONS  acetaminophen     Tablet .. 1000 milliGRAM(s) Oral every 8 hours  citalopram 40 milliGRAM(s) Oral daily  gabapentin 800 milliGRAM(s) Oral at bedtime  heparin   Injectable 5000 Unit(s) SubCutaneous once  lidocaine   4% Patch 1 Patch Transdermal every 24 hours  loratadine 10 milliGRAM(s) Oral daily  methocarbamol 750 milliGRAM(s) Oral every 6 hours  montelukast 10 milliGRAM(s) Oral at bedtime  nortriptyline 25 milliGRAM(s) Oral at bedtime  pantoprazole    Tablet 40 milliGRAM(s) Oral before breakfast  pregabalin 150 milliGRAM(s) Oral every 8 hours  topiramate 25 milliGRAM(s) Oral two times a day    PRN MEDICATIONS  traMADol 50 milliGRAM(s) Oral every 4 hours PRN    VITALS:   T(F): 97.3  HR: 98  BP: 118/77  RR: 18  SpO2: 99%    PHYSICAL EXAM:  GENERAL:   (  ) NAD, lying in bed comfortably     (  ) obtunded     (  ) lethargic     (  ) somnolent    HEAD:   (  ) Atraumatic     (  ) hematoma     (  ) laceration (specify location:       )     NECK:  (  ) Supple     (  ) neck stiffness     (  ) nuchal rigidity     (  )  no JVD     (  ) JVD present ( -- cm)    HEART:  Rate -->     (  ) normal rate     (  ) bradycardic     (  ) tachycardic  Rhythm -->     (  ) regular     (  ) regularly irregular     (  ) irregularly irregular  Murmurs -->     (  ) normal s1s2     (  ) systolic murmur     (  ) diastolic murmur     (  ) continuous murmur      (  ) S3 present     (  ) S4 present    LUNGS:   (  )Unlabored respirations     (  ) tachypnea  (  ) B/L air entry     (  ) decreased breath sounds in:  (location     )    (  ) no adventitious sound     (  ) crackles     (  ) wheezing      (  ) rhonchi      (specify location:       )  (  ) chest wall tenderness (specify location:       )    ABDOMEN:   (  ) Soft     (  ) tense   |   (  ) nondistended     (  ) distended   |   (  ) +BS     (  ) hypoactive bowel sounds     (  ) hyperactive bowel sounds  (  ) nontender     (  ) RUQ tenderness     (  ) RLQ tenderness     (  ) LLQ tenderness     (  ) epigastric tenderness     (  ) diffuse tenderness  (  ) Splenomegaly      (  ) Hepatomegaly      (  ) Jaundice     (  ) ecchymosis     EXTREMITIES:  (  ) Normal     (  ) Rash     (  ) ecchymosis     (  ) varicose veins      (  ) pitting edema     (  ) non-pitting edema   (  ) ulceration     (  ) gangrene:     (location:     )    NERVOUS SYSTEM:    (  ) A&Ox3     (  ) confused     (  ) lethargic  CN II-XII:     (  ) Intact     (  ) deficits found     (Specify:     )   Upper extremities:     (  ) no sensorimotor deficits     (  ) weakness     (  ) loss of proprioception/vibration     (  ) loss of touch/temperature (specify:    )  Lower extremities:     (  ) no sensorimotor deficits     (  ) weakness     (  ) loss of proprioception/vibration     (  ) loss of touch/temperature (specify:    )    SKIN:   (  ) No rashes or lesions     (  ) maculopapular rash     (  ) pustules     (  ) vesicles     (  ) ulcer     (  ) ecchymosis     (specify location:     )      LABS:                        RADIOLOGY:

## 2024-01-23 NOTE — PROGRESS NOTE ADULT - ASSESSMENT
38F w with history of anxiety and depression as well as chronic neck pain status post work-related injury 2019.  With subsequent decompression surgery C5-C6 which failed and now goes to pain management for periodic epidurals today with constant burning shooting pain from right side of neck down right arm x 3 days which began right after a cervical epidural procedure.     #right cervical radiculopathy s/p ALONZO and moderate foraminal stenosis C4-6.  #poss compression  sp cervical epidural procedure  seen by NSx  - MR spine noted  - cont pain managment  - Nsx: outpatient with Dr Hester 2 weeks after DC  - F/u [facility] saha    #anxiety  #depression  - cont home meds    dvt ppx: heparin 1 dose  GI ppx: prootnix  diet: regular  activity: AAT   38F w with history of anxiety and depression as well as chronic neck pain status post work-related injury 2019.  With subsequent decompression surgery C5-C6 which failed and now goes to pain management for periodic epidurals today with constant burning shooting pain from right side of neck down right arm x 3 days which began right after a cervical epidural procedure.     #right cervical radiculopathy s/p ALONZO and moderate foraminal stenosis C4-6.  #poss compression  sp cervical epidural procedure  seen by NSx  - MR spine noted  - cont pain managment  - Nsx: outpatient with Dr Hester 2 weeks after DC  - F/u [facility] duplex  - pt was to be discharged today, would like to speak to pain management again    #anxiety  #depression  - cont home meds    dvt ppx: heparin 1 dose  GI ppx: prootnix  diet: regular  activity: AAT    pending: [facility] duplex read, pain management follow up today

## 2024-01-23 NOTE — CONSULT NOTE ADULT - SUBJECTIVE AND OBJECTIVE BOX
PAIN MANAGEMENT follow up  NOTE    Chief Complaint: neck pain    HPI:  38F w with history of anxiety and depression as well as chronic neck pain status post work-related injury 2019.  With subsequent decompression surgery C5-C6 which failed and now goes to pain management for periodic epidurals today with constant burning shooting pain from right side of neck down right arm x 3 days which began right after a cervical epidural procedure.  Patient states her pain was but now it is constant, unrelieved with her normal pain medications of Lyrica Topamax nortriptyline and tizanidine.    Denies fevers or chills or weakness    Pain hx 1/23/24  Patient states she continues to have right sided neck pain that radiates down her right arm that is burning in nature and constant unrelieved by any of the medications. She states her head is being pulled to the right shoulder as her head was sidebending to the right at 90 degrees during the encounter. She stated when she raises her right arm, her right upper extremity trembles and complains of weakness. She is able to flex and extend her digits. She was on gabapentin 800mg TID outpatient which did not help and was switched to lyrica 150mg TID for nerve pain prior to the injection. She was on nortriptyline topamax and nurtec with minimal relief.     in ed  VS stable  wbc 15      sp dexa + ketorolac + morphine 6 (20 Jan 2024 04:47)      PAST MEDICAL & SURGICAL HISTORY:  Anxiety and depression      H/O migraine      History of posttraumatic stress disorder (PTSD)      Cervical spondylosis with radiculopathy      Asthma, intermittent      History of cholecystectomy      H/O knee surgery      H/O rotator cuff surgery      Previous back surgery          FAMILY HISTORY:  FH: lung cancer (Father)    FH: diabetes mellitus (Mother)    FH: hypercholesterolemia (Mother)        SOCIAL HISTORY:  [x ] Denies Smoking, Alcohol, or Drug Use    HOME MEDICATIONS:   Please refer to initial HNP    PAIN HOME MEDICATIONS:    Allergies    [This allergen will not trigger allergy alert] Sulfur (Unknown)  [This allergen will not trigger allergy alert] Sulfamethoxazole / Trimethoprim  Reaction: Unknown (Unknown)  iodine (Other (Severe))  [This allergen will not trigger allergy alert] Iodinated Contrast Media (Unknown)  [This allergen will not trigger allergy alert] gadolinium (Unknown)  sulfa drugs (Anaphylaxis)  sulfasalazine (Unknown)    Intolerances        PAIN MEDICATIONS:  acetaminophen     Tablet .. 1000 milliGRAM(s) Oral every 8 hours  citalopram 40 milliGRAM(s) Oral daily  gabapentin 800 milliGRAM(s) Oral at bedtime  methocarbamol 750 milliGRAM(s) Oral every 6 hours  nortriptyline 25 milliGRAM(s) Oral at bedtime  pregabalin 150 milliGRAM(s) Oral every 8 hours  topiramate 25 milliGRAM(s) Oral two times a day  traMADol 50 milliGRAM(s) Oral every 4 hours PRN    Heme:  heparin   Injectable 5000 Unit(s) SubCutaneous once    Antibiotics:    Cardiovascular:    GI:  pantoprazole    Tablet 40 milliGRAM(s) Oral before breakfast  polyethylene glycol 3350 17 Gram(s) Oral two times a day  senna 2 Tablet(s) Oral at bedtime    Endocrine:    All Other Medications:  lidocaine   4% Patch 1 Patch Transdermal every 24 hours      Vital Signs Last 24 Hrs  T(C): 36.7 (23 Jan 2024 20:04), Max: 36.7 (23 Jan 2024 20:04)  T(F): 98 (23 Jan 2024 20:04), Max: 98 (23 Jan 2024 20:04)  HR: 94 (23 Jan 2024 20:04) (82 - 98)  BP: 156/70 (23 Jan 2024 20:04) (115/71 - 156/70)  BP(mean): 92 (23 Jan 2024 20:04) (88 - 108)  RR: 20 (23 Jan 2024 20:04) (18 - 20)  SpO2: 99% (23 Jan 2024 04:57) (97% - 99%)    Parameters below as of 23 Jan 2024 13:47  Patient On (Oxygen Delivery Method): room air        LABS:                RADIOLOGY:    ACC: [id number] EXAM:  MR SPINE CERVICAL   ORDERED BY: ANGEL SIEGEL     PROCEDURE DATE:  01/20/2024          INTERPRETATION:  Clinical History / Reason for exam: Intractable neck pain    Technique: MRI cervical spine without contrast. Multiplanar multi   sequential MRI of the cervical spine was performed without intravenous   contrast on a 3 Imani magnet.    COMPARISON: MRI cervical spine 2/14/2023    FINDINGS:    The cervical vertebral bodies maintain normal height and alignment.    Bone marrow signal demonstrates no acute abnormality.    The cervical spinal cord is normal in signal.    There is multilevel disc desiccation with maintained disc space heights.    At C2-3 there is no significant disc herniation, canal or foraminal   stenosis.    At C3-4 there is a small central disc protrusion and mild right uncinate   spurring with mild spinal stenosis with mild ventral spinal cord   abutment; and mild right foraminal narrowing.    At C4-5 there is a small disc osteophyte complex with mildspinal   stenosis with mild ventral spinal cord abutment; and uncinate spurring   with moderate bilateral foraminal stenosis.    At C5-6 there is disc osteophyte complex with mild spinal stenosis with   mild ventral spinal cord abutment; and uncinateand facet hypertrophy   with severe right and moderate left foraminal stenosis.    At C6-7 there is trace disc bulge and uncinate spurring with mild   bilateral foraminal stenosis. No significant spinal stenosis.    At C7-T1 there is no significant disc herniation, canal or foraminal   stenosis.    Stable mildly enlarged thyroid gland. The previously described right   thyroid nodule is not as well seen on the current exam.    IMPRESSION:    1.  Multilevel degenerative changes, not significantly changed since the   previous cervical spine MRI dated 2/14/2023.    2.  Mild spinal stenosis at C3-4, C4-5 and C5-6 with mild ventral spinal   cord abutment.    3.  Neuroforaminal stenosis at C4-5 and C5-6 (most pronounced on the   right at C5-6).    --- End of Report ---            STACIA ZAMUDIO MD; Attending Radiologist  This document has been electronically signed. Jan 20 2024  1:23PM        REVIEW OF SYSTEMS:  see hpi    PHYSICAL EXAM  GENERAL: Laying in bed, head sidebent to her right shoulder at almost 90 degrees (side of burning in her shoulder and arm)  CHEST/LUNG: no audible wheeze, no accessory muscle usage  neuro: patient was able to flexion and abduct to 60-70 degrees but was shaking of her right arm  was able to flex and extend the c spine      ASSESSMENT:   cervical radiculopathy  cervical stenosis    PLAN:   - Pain:  can discharge home from perspective  would send medrol dose pack to her pharmacy and follow up with Dr Salcido in 1 week  can continue all other current pain medications she is on

## 2024-01-23 NOTE — PROGRESS NOTE ADULT - SUBJECTIVE AND OBJECTIVE BOX
NIKKO CLARK  [facility] 3E [address] (SI-N 3E)      Patient was evaluated and examined  by bedside, c/o persistent burning pain in right upper extremity       REVIEW OF SYSTEMS: please see pertinent positives mentioned above, all other 12 ROS negative      T(C): , Max: 36.3 (01-23-24 @ 13:47)  HR: 98 (01-23-24 @ 13:47)  BP: 118/77 (01-23-24 @ 13:47)  RR: 18 (01-23-24 @ 13:47)  SpO2: 99% (01-23-24 @ 04:57)  CAPILLARY BLOOD GLUCOSE          PHYSICAL EXAM:  General: NAD, AAOX3, patient is laying comfortably in bed  HEENT: AT, NC, Supple, NO JVD, NO CB  Lungs: CTA B/L, no wheezing, no rhonchi  CVS: normal S1, S2, RRR, NO M/G/R  Abdomen: soft, bowel sounds present, non-tender, non-distended  Extremities: no edema, no clubbing, no cyanosis, positive peripheral pulses b/l  Neuro: no acute focal neurological deficits, right upper ext mild paresthesia,  posterior neck pain  Skin: no rash, no ecchymosis        LAB  CBC  Date: 01-20-24 @ 08:18  Mean cell Fznhqjbifv56.3  Mean cell Hemoglobin Conc32.9  Mean cell Volum 86.0  Platelet count-Automate 316  RBC Count 4.80  Red Cell Distrib Width14.6  WBC Count18.34  % Albumin, Urine--  Hematocrit 41.3  Hemoglobin 13.6  CBC  Date: 01-19-24 @ 15:34  Mean cell Pgcrbzwuyu67.0  Mean cell Hemoglobin Conc32.5  Mean cell Volum 86.3  Platelet count-Automate 300  RBC Count 4.96  Red Cell Distrib Width14.7  WBC Count15.62  % Albumin, Urine--  Hematocrit 42.8  Hemoglobin 13.9    BMP  01-20-24 @ 08:18  Blood Gas Arterial-Calcium,Ionized--  Blood Urea Nitrogen, Serum 14 mg/dL [10 - 20]  Carbon Dioxide, Serum21 mmol/L [17 - 32]  Chloride, Vjqma001 mmol/L [98 - 110]  Creatinie, Serum0.7 mg/dL [0.7 - 1.5]  Glucose, Wsyld289 mg/dL<H> [70 - 99]  Potassium, Serum4.2 mmol/L [3.5 - 5.0]  Sodium, Serum 139 mmol/L [135 - 146]  BMP  01-19-24 @ 15:34  Blood Gas Arterial-Calcium,Ionized--  Blood Urea Nitrogen, Serum 10 mg/dL [10 - 20]  Carbon Dioxide, Serum23 mmol/L [17 - 32]  Chloride, Xkjmy862 mmol/L [98 - 110]  Creatinie, Serum0.8 mg/dL [0.7 - 1.5]  Glucose, Serum72 mg/dL [70 - 99]  Potassium, Serum5.2 mmol/L<H> [3.5 - 5.0] [Hemolyzed. Interpret with caution]  Sodium, Serum 139 mmol/L [135 - 146]        Medications:  acetaminophen     Tablet .. 1000 milliGRAM(s) Oral every 8 hours  citalopram 40 milliGRAM(s) Oral daily  gabapentin 800 milliGRAM(s) Oral at bedtime  heparin   Injectable 5000 Unit(s) SubCutaneous once  lidocaine   4% Patch 1 Patch Transdermal every 24 hours  loratadine 10 milliGRAM(s) Oral daily  methocarbamol 750 milliGRAM(s) Oral every 6 hours  montelukast 10 milliGRAM(s) Oral at bedtime  nortriptyline 25 milliGRAM(s) Oral at bedtime  pantoprazole    Tablet 40 milliGRAM(s) Oral before breakfast  pregabalin 150 milliGRAM(s) Oral every 8 hours  topiramate 25 milliGRAM(s) Oral two times a day  traMADol 50 milliGRAM(s) Oral every 4 hours PRN        Assessment and Plan:  Patient is a 39 y/o Female  with PMH of anxiety and depression as well as chronic neck pain status post work-related injury 2019.  With subsequent decompression surgery C5-C6 which failed and now goes to pain management for periodic epidurals. c/w constant burning shooting pain from right side of neck down right arm x 3 days which began right after a cervical epidural procedure.       #right cervical radiculopathy s/p ALONZO and moderate foraminal stenosis C4-6.  sp cervical epidural procedure  - s/p MRI of C-Spine: < from: MR Cervical Spine No Cont (01.20.24 @ 12:25) > IMPRESSION:  1.  Multilevel degenerative changes, not significantly changed since the previous cervical spine MRI dated 2/14/2023.  2.  Mild spinal stenosis at C3-4, C4-5 and C5-6 with mild ventral spinal cord abutment.  3.  Neuroforaminal stenosis at C4-5 and C5-6 (most pronounced on the right at C5-6).  --- End of Report --- < end of copied text >  - continue pain regimen tx. as per Pain management team recommendations.   - Neurosurgical team recommended RUE venous doppler  - most likely patient will need outpatient Pain management f/up with Neurosurgical f/up       #anxiety/#depression- stable,  continue  home meds    #Progress Note Handoff: Post Neurosurgical team evaluation, recommended to complete right upper ext. venous doppler  , post Pain management specialist evaluation, recommendations followed,  d/c home planning today  Family discussion: current medical plan of therapy d/w pt. by bedside, she is in agreement with treatment plan Disposition: Home_ possibly today    Total time spent to complete patient's bedside assessment, review medical chart, discuss medical plan of care with covering medical team was more than 35 minutes with >50% of time spent face to face with patient, discussion with patient/family and/or coordination of care

## 2024-01-24 ENCOUNTER — TRANSCRIPTION ENCOUNTER (OUTPATIENT)
Age: 39
End: 2024-01-24

## 2024-01-24 VITALS
TEMPERATURE: 97 F | RESPIRATION RATE: 18 BRPM | HEART RATE: 83 BPM | SYSTOLIC BLOOD PRESSURE: 118 MMHG | DIASTOLIC BLOOD PRESSURE: 71 MMHG

## 2024-01-24 PROCEDURE — 99231 SBSQ HOSP IP/OBS SF/LOW 25: CPT | Mod: 25

## 2024-01-24 RX ORDER — ACETAMINOPHEN 500 MG
2 TABLET ORAL
Qty: 180 | Refills: 0 | DISCHARGE
Start: 2024-01-24 | End: 2024-02-22

## 2024-01-24 RX ORDER — METHOCARBAMOL 500 MG/1
1 TABLET, FILM COATED ORAL
Qty: 120 | Refills: 0
Start: 2024-01-24 | End: 2024-02-22

## 2024-01-24 RX ADMIN — Medication 150 MILLIGRAM(S): at 05:31

## 2024-01-24 RX ADMIN — METHOCARBAMOL 750 MILLIGRAM(S): 500 TABLET, FILM COATED ORAL at 05:31

## 2024-01-24 RX ADMIN — Medication 25 MILLIGRAM(S): at 05:31

## 2024-01-24 NOTE — PROGRESS NOTE ADULT - REASON FOR ADMISSION
posterior neck pain with radiation to right upper extremity
cervical spine pain with radiation to right upper extremity
posterior neck pain with right upper extremity pain
upper back pain/right upper extremity pain

## 2024-01-24 NOTE — PROGRESS NOTE ADULT - SUBJECTIVE AND OBJECTIVE BOX
NIKKO CLARK  [facility] 3[facility] [address] (SI-N 3E)      Patient was evaluated and examined  by bedside, reports pain in posterior neck area with radiation to right upper extremity        REVIEW OF SYSTEMS:  please see pertinent positives mentioned above, all other 12 ROS negative      T(C): , Max: 36.7 (01-23-24 @ 20:04)  HR: 83 (01-24-24 @ 05:59)  BP: 118/71 (01-24-24 @ 05:59)  RR: 18 (01-24-24 @ 05:59)  SpO2: --  CAPILLARY BLOOD GLUCOSE          PHYSICAL EXAM:  General: NAD, AAOX3, patient is laying comfortably in bed  HEENT: AT, NC, Supple, NO JVD, NO CB  Lungs: CTA B/L, no wheezing, no rhonchi  CVS: normal S1, S2, RRR, NO M/G/R  Abdomen: soft, bowel sounds present, non-tender, non-distended  Extremities: no edema, no clubbing, no cyanosis, positive peripheral pulses b/l  Neuro: no acute focal neurological deficits, right upper ext mild paresthesia,  posterior neck pain  Skin: no rash, no ecchymosis        LAB  CBC  Date: 01-20-24 @ 08:18  Mean cell Ecarqtgvhe19.3  Mean cell Hemoglobin Conc32.9  Mean cell Volum 86.0  Platelet count-Automate 316  RBC Count 4.80  Red Cell Distrib Width14.6  WBC Count18.34  % Albumin, Urine--  Hematocrit 41.3  Hemoglobin 13.6  CBC  Date: 01-19-24 @ 15:34  Mean cell Asrtyvssiz81.0  Mean cell Hemoglobin Conc32.5  Mean cell Volum 86.3  Platelet count-Automate 300  RBC Count 4.96  Red Cell Distrib Width14.7  WBC Count15.62  % Albumin, Urine--  Hematocrit 42.8  Hemoglobin 13.9    BMP  01-20-24 @ 08:18  Blood Gas Arterial-Calcium,Ionized--  Blood Urea Nitrogen, Serum 14 mg/dL [10 - 20]  Carbon Dioxide, Serum21 mmol/L [17 - 32]  Chloride, Fdpyc943 mmol/L [98 - 110]  Creatinie, Serum0.7 mg/dL [0.7 - 1.5]  Glucose, Gpbbi300 mg/dL<H> [70 - 99]  Potassium, Serum4.2 mmol/L [3.5 - 5.0]  Sodium, Serum 139 mmol/L [135 - 146]  [facility]  01-19-24 @ 15:34  Blood Gas Arterial-Calcium,Ionized--  Blood Urea Nitrogen, Serum 10 mg/dL [10 - 20]  Carbon Dioxide, Serum23 mmol/L [17 - 32]  Chloride, Uuzui231 mmol/L [98 - 110]  Creatinie, Serum0.8 mg/dL [0.7 - 1.5]  Glucose, Serum72 mg/dL [70 - 99]  Potassium, Serum5.2 mmol/L<H> [3.5 - 5.0] [Hemolyzed. Interpret with caution]  Sodium, Serum 139 mmol/L [135 - 146]        Medications:  acetaminophen     Tablet .. 1000 milliGRAM(s) Oral every 8 hours  citalopram 40 milliGRAM(s) Oral daily  gabapentin 800 milliGRAM(s) Oral at bedtime  heparin   Injectable 5000 Unit(s) SubCutaneous once  lidocaine   4% Patch 1 Patch Transdermal every 24 hours  loratadine 10 milliGRAM(s) Oral daily  methocarbamol 750 milliGRAM(s) Oral every 6 hours  montelukast 10 milliGRAM(s) Oral at bedtime  nortriptyline 25 milliGRAM(s) Oral at bedtime  pantoprazole    Tablet 40 milliGRAM(s) Oral before breakfast  polyethylene glycol 3350 17 Gram(s) Oral two times a day  pregabalin 150 milliGRAM(s) Oral every 8 hours  senna 2 Tablet(s) Oral at bedtime  topiramate 25 milliGRAM(s) Oral two times a day  traMADol 50 milliGRAM(s) Oral every 4 hours PRN        Assessment and Plan:  Patient is a 39 y/o Female  with PMH of anxiety and depression as well as chronic neck pain status post work-related injury 2019.  With subsequent decompression surgery C5-C6 which failed and now goes to pain management for periodic epidurals. c/w constant burning shooting pain from right side of neck down right arm x 3 days which began right after a cervical epidural procedure.       #chronic right cervical radiculopathy s/p ALONZO and moderate foraminal stenosis C4-6.  sp cervical epidural procedure  - s/p MRI of C-Spine: < from: MR Cervical Spine No Cont (01.20.24 @ 12:25) > IMPRESSION:  1.  Multilevel degenerative changes, not significantly changed since the previous cervical spine MRI dated 2/14/2023.  2.  Mild spinal stenosis at C3-4, C4-5 and C5-6 with mild ventral spinal cord abutment.  3.  Neuroforaminal stenosis at C4-5 and C5-6 (most pronounced on the right at C5-6).  --- End of Report --- < end of copied text >  - continue pain regimen tx. as per Pain management team recommendations. - all prescriptions were submitted to  the pharmacy electronically   - Neurosurgical team recommended RUE venous doppler- negative for DVT- copy of the results was provided to the patient, as per patient's request  - AS per Neurosurgical Specialist recommendations- no further inpatient procedures or tests required at present time, patient is stable for d/c home with current medical regimen tx.   - Patient was instructed to schedule apt. with   Pain management and  Neurosurgical team post d/c home , for further outpatient management and therapy.         #anxiety/#depression- stable,  continue  home meds    #Progress Note Handoff: Patient was medically optimized stable for d/c home today   Family discussion: Patient verbalized good understanding of discharge instructions and agreed with discharge plan.   Disposition: Home_ today     Total time spent to complete patient's bedside assessment, review medical chart and discharge note with discharge instructions , discuss discharge medical plan of care with covering medical team was more than 35 minutes with >50% of time spent face to face with patient, discussion with patient/family and/or coordination of care

## 2024-01-24 NOTE — DISCHARGE NOTE NURSING/CASE MANAGEMENT/SOCIAL WORK - PATIENT PORTAL LINK FT
You can access the FollowMyHealth Patient Portal offered by [facility] by registering at the following website: [email or web address]. By joining [facility]’s FollowMyHealth portal, you will also be able to view your health information using other applications (apps) compatible with our system.

## 2024-01-24 NOTE — PATIENT PROFILE ADULT - NSPROHMSYMPCOND_GEN_A_NUR
Diane Mcdaniel (:  1995) is a 28 y.o. female, here for evaluation of the following chief complaint(s):  Sexually Transmitted Diseases (Pt is asymptomatic here for STD test.)      ASSESSMENT/PLAN:  Visit Diagnoses and Associated Orders       Exposure to chlamydia    -  Primary    C.trachomatis N.gonorrhoeae DNA, Urine [IOH1377 Custom]      Trichomonas by EIA [[id number] Custom]           ORDERS WITHOUT AN ASSOCIATED DIAGNOSIS    azithromycin (ZITHROMAX) 500 MG tablet [80200]             Summary    - I will also start with empirical antibiotic therapy given her exam and complaints.   - No symptoms    SUBJECTIVE/OBJECTIVE:  HPI    Diane presents to the clinic for concerns that she has an STD because she received an anonymous text message saying that she should be tested for chlamydia.  She denies history of STDs and only has one partner, male, who she has been with for 11 years. She denies having any symptoms at this time.     Vitals:    24 1336   BP: 125/80   Pulse: 75   Temp: 98.3 °F (36.8 °C)   TempSrc: Oral   SpO2: 100%   Weight: 81 kg (178 lb 9.6 oz)   Height: 1.702 m (5' 7\")       No results found for this visit on 24.     No orders to display       Review of Systems      Physical Exam  Vitals reviewed.   Constitutional:       Appearance: She is normal weight.   HENT:      Head: Normocephalic.      Mouth/Throat:      Mouth: Mucous membranes are moist.   Eyes:      Pupils: Pupils are equal, round, and reactive to light.   Pulmonary:      Effort: Pulmonary effort is normal.   Abdominal:      Tenderness: There is no guarding.   Musculoskeletal:      Cervical back: Neck supple.   Skin:     General: Skin is warm.   Neurological:      Mental Status: She is alert and oriented to person, place, and time.   Psychiatric:         Mood and Affect: Mood normal.         Behavior: Behavior normal.              An electronic signature was used to authenticate this note.    Maycol Dominguez, APRN - CNP           behavioral health/diabetes/immunologic/musculoskeletal/neurologic

## 2024-01-30 DIAGNOSIS — Z88.2 ALLERGY STATUS TO SULFONAMIDES: ICD-10-CM

## 2024-01-30 DIAGNOSIS — Z88.8 ALLERGY STATUS TO OTHER DRUGS, MEDICAMENTS AND BIOLOGICAL SUBSTANCES: ICD-10-CM

## 2024-01-30 DIAGNOSIS — M48.02 SPINAL STENOSIS, CERVICAL REGION: ICD-10-CM

## 2024-01-30 DIAGNOSIS — F32.A DEPRESSION, UNSPECIFIED: ICD-10-CM

## 2024-01-30 DIAGNOSIS — J45.20 MILD INTERMITTENT ASTHMA, UNCOMPLICATED: ICD-10-CM

## 2024-01-30 DIAGNOSIS — F41.9 ANXIETY DISORDER, UNSPECIFIED: ICD-10-CM

## 2024-01-30 DIAGNOSIS — M47.22 OTHER SPONDYLOSIS WITH RADICULOPATHY, CERVICAL REGION: ICD-10-CM

## 2024-01-30 DIAGNOSIS — F43.10 POST-TRAUMATIC STRESS DISORDER, UNSPECIFIED: ICD-10-CM

## 2024-01-30 DIAGNOSIS — G62.9 POLYNEUROPATHY, UNSPECIFIED: ICD-10-CM

## 2024-01-30 DIAGNOSIS — Z91.041 RADIOGRAPHIC DYE ALLERGY STATUS: ICD-10-CM

## 2024-02-01 ENCOUNTER — APPOINTMENT (OUTPATIENT)
Dept: NEUROSURGERY | Facility: CLINIC | Age: 39
End: 2024-02-01
Payer: MEDICAID

## 2024-02-01 VITALS — WEIGHT: 188 LBS | BODY MASS INDEX: 30.22 KG/M2 | HEIGHT: 66 IN

## 2024-02-01 DIAGNOSIS — M48.02 SPINAL STENOSIS, CERVICAL REGION: ICD-10-CM

## 2024-02-01 PROCEDURE — 99214 OFFICE O/P EST MOD 30 MIN: CPT

## 2024-02-01 NOTE — ASSESSMENT
[FreeTextEntry1] : This is a 38-year-old female who sustained a workplace injury affecting the cervical region with associated radicular features affecting the right upper extremity.  As mentioned, she is indicated for an ACDF C4-6 and authorization is to be requested once again.  Unfortunately, her surgery has been denied several times to date.  The patient has significant physical complaints and quality of life limitations due to her work-related accident.  The goal of surgery is to improve her overall pain and function and provide her with quality of life enhancements.  Patient is to return to the office in 6-8 weeks; sooner if any issues were to arise.  I personally reviewed with the PA, this patient's history and physical exam findings, as documented above. I have discussed the relevant areas of concern, having direct implications to the presenting problems and illnesses, and I have personally examined all pertinent and positive and negative findings, which impact the neurosurgical treatment plan.  EVENS Heard MD

## 2024-02-01 NOTE — HISTORY OF PRESENT ILLNESS
[FreeTextEntry1] : CHIEF COMPLAINT:   This is a 38-year-old female who is well-known to our neurosurgical office with regards to cervical pain with associated radicular features into the right upper extremity.  She is status post right C5-6 laminoforaminotomy which took place on February 17, 2023.  A continued burning sensation noted into the right upper extremity extending into all 5 digits with numbness and tingling.  Cervicogenic headaches reported as well.  She had been indicated for an ACDF C4-6 but it appears as though her insurance has failed to approve the requested procedure.  Over the course of the past months she has undergone a cervical epidural steroid injection.  Upon awakening from the procedure she developed severe acute pain involving the cervical region with associated radicular features into the right upper extremity.  Pain was severe with blood pressure rising (per patient 180/100) which necessitated a prompt ER transfer.  Imaging and testing performed at that time confirmed that there was no evidence of epidural hematoma or infection therefore patient's pain was stabilized and she was ultimately discharged to home.  She is indicated for cervical fusion as mentioned above to aid with her right upper extremity pain complaints in the setting of severe degenerative disc changes with neuroforaminal narrowing.  She has attempted and failed all conservative and interventional efforts to date including but not limited to a minimally invasive laminoforaminotomy as outlined above.  CONSERVATIVE MANAGEMENT TRIALED: She went to pain management where she obtained her 3rd Epidural Injection which gave no relief, No PT at this time   DIAGNOSTIC TESTING: MRI Cervical at [facility] 8/31/2023: Broad-based disc/osteophyte complex again noted at C4-5 producing marked anterior thecal sac effacement without cord deformity. Moderate bilateral neural foraminal narrowing is also noted at this level, unchanged. Central/right paracentral disc/osteophyte complex at C5-6 which mildly flattens the ventral cord on the right, unchanged. Moderate to marked right and mild left neural foraminal narrowing is also again seen at this level.  PHYSICAL EXAM: Neurologic: CN II-XII grossly intact; EOMI with no nystagmus. No facial asymmetry bilaterally, full eye closure strength bilaterally. Hearing grossly normal. Head turning & shoulder shrug intact, bilaterally. Tongue midline, normal movements, no atrophy. Smile symmetrical. Strength: Full strength in all major muscle groups, RUE limited by pain Sensation: Full sensation to light touch in all extremities, V1-V3 sensation bilaterally intact. Motor: No pronator drift, muscle strength of bilateral UE and bilateral LE Full strength in all major muscle groups, no atrophy, except RUE 4/5 pain/effort limited Reflexes: DTR of biceps, knee and ankle normal 2+ biceps 2+ triceps 2+ patellar  No Clonus No Valentine's  ROM LIMITED  Gait: No postural instability.

## 2024-04-08 ENCOUNTER — APPOINTMENT (OUTPATIENT)
Dept: UROLOGY | Facility: CLINIC | Age: 39
End: 2024-04-08
Payer: MEDICAID

## 2024-04-08 VITALS
WEIGHT: 200 LBS | RESPIRATION RATE: 16 BRPM | TEMPERATURE: 98.8 F | DIASTOLIC BLOOD PRESSURE: 85 MMHG | HEIGHT: 66 IN | BODY MASS INDEX: 32.14 KG/M2 | SYSTOLIC BLOOD PRESSURE: 130 MMHG | OXYGEN SATURATION: 99 % | HEART RATE: 108 BPM

## 2024-04-08 DIAGNOSIS — I10 ESSENTIAL (PRIMARY) HYPERTENSION: ICD-10-CM

## 2024-04-08 DIAGNOSIS — R52 PAIN, UNSPECIFIED: ICD-10-CM

## 2024-04-08 DIAGNOSIS — Z80.3 FAMILY HISTORY OF MALIGNANT NEOPLASM OF BREAST: ICD-10-CM

## 2024-04-08 DIAGNOSIS — Z80.1 FAMILY HISTORY OF MALIGNANT NEOPLASM OF TRACHEA, BRONCHUS AND LUNG: ICD-10-CM

## 2024-04-08 DIAGNOSIS — J30.2 OTHER SEASONAL ALLERGIC RHINITIS: ICD-10-CM

## 2024-04-08 DIAGNOSIS — M47.22 OTHER SPONDYLOSIS WITH RADICULOPATHY, CERVICAL REGION: ICD-10-CM

## 2024-04-08 PROCEDURE — 99204 OFFICE O/P NEW MOD 45 MIN: CPT

## 2024-04-08 RX ORDER — METHYLPREDNISOLONE 4 MG/1
4 TABLET ORAL
Qty: 1 | Refills: 0 | Status: DISCONTINUED | COMMUNITY
Start: 2023-04-20 | End: 2024-04-08

## 2024-04-08 RX ORDER — TIZANIDINE 2 MG/1
2 TABLET ORAL
Refills: 0 | Status: DISCONTINUED | COMMUNITY
End: 2024-04-08

## 2024-04-08 RX ORDER — GABAPENTIN 300 MG/1
300 CAPSULE ORAL
Qty: 7 | Refills: 0 | Status: DISCONTINUED | COMMUNITY
Start: 2023-11-13 | End: 2024-04-08

## 2024-04-08 RX ORDER — GABAPENTIN 800 MG/1
800 TABLET, COATED ORAL
Refills: 0 | Status: DISCONTINUED | COMMUNITY
End: 2024-04-08

## 2024-04-08 RX ORDER — FAMOTIDINE 20 MG/1
20 TABLET, FILM COATED ORAL
Qty: 6 | Refills: 0 | Status: DISCONTINUED | COMMUNITY
Start: 2023-04-20 | End: 2024-04-08

## 2024-04-11 ENCOUNTER — NON-APPOINTMENT (OUTPATIENT)
Age: 39
End: 2024-04-11

## 2024-04-11 ENCOUNTER — APPOINTMENT (OUTPATIENT)
Dept: ORTHOPEDIC SURGERY | Facility: CLINIC | Age: 39
End: 2024-04-11
Payer: OTHER MISCELLANEOUS

## 2024-04-11 DIAGNOSIS — M25.511 PAIN IN RIGHT SHOULDER: ICD-10-CM

## 2024-04-11 PROBLEM — M47.22 CERVICAL SPONDYLOSIS WITH RADICULOPATHY: Status: ACTIVE | Noted: 2022-07-18

## 2024-04-11 LAB
BILIRUB UR QL STRIP: NORMAL
COLLECTION METHOD: NORMAL
GLUCOSE UR-MCNC: NORMAL
HCG UR QL: 0.2 EU/DL
HGB UR QL STRIP.AUTO: NORMAL
KETONES UR-MCNC: NORMAL
LEUKOCYTE ESTERASE UR QL STRIP: NORMAL
NITRITE UR QL STRIP: NORMAL
PH UR STRIP: 6
PROT UR STRIP-MCNC: NORMAL
SP GR UR STRIP: 1.02

## 2024-04-11 PROCEDURE — 99213 OFFICE O/P EST LOW 20 MIN: CPT

## 2024-04-11 NOTE — ASSESSMENT
[FreeTextEntry1] : 38 year old female patient with overactive bladder, mild stress incontinence, and feeling of incomplete emptying. As noted earlier, she never has true urge incontinence and always does make it to the bathroom. I have asked her to do a baseline voiding diary. I have also ordered a renal and bladder ultrasound. She will then follow-up. It is unclear whether she is going to be having a fusion in the near future. If she is not, we may consider her for pelvic floor therapy or PTNS. We also discussed medications and their side effects and will make a final decision after we see her voiding diary and results of renal and bladder ultrasound. Total time=45 min.   The submitted E/M billing level for this visit reflects the total time spent on the day of the visit including face-to-face time spent with the patient, non-face-to-face review of medical records and relevant information, documentation, and asynchronous communication with the patient after a visit via phone, email, or patients EHR portal after the visit. The medical records reviewed are either scanned into the chart or reviewed with the patient using a patients electronic medical records portal for patients with records not available to [facility] via electronic transmission platforms from other institutions and labs. Time spent counseling and performing coordination of care was also included in determining the appropriate EM billing level.   I have reviewed and verified information regarding the chief complaint and history recorded by the ancillary staff and/or the patient. I have independently reviewed and interpreted tests performed by other physicians and facilities as necessary.   I have discussed with the patient differential diagnosis, reason for auxiliary tests if ordered, risks, benefits, alternatives, and complications of each form of therapy were discussed.  I personally performed the services described in the documentation, reviewed the documentation recorded by the scribe in my presence, and it accurately and completely records my words and actions.

## 2024-04-11 NOTE — ADDENDUM
[FreeTextEntry1] :  MANUELA DONG, am scribing for and in the presence of  in the following sections: HISTORY OF PRESENT ILLNESS, PAST MEDICAL/FAMILY/SOCIAL HISTORY, REVIEW OF SYSTEMS, VITAL SIGNS, PHYSICAL EXAM, ASSESSMENT/PLAN on 04/08/2024.

## 2024-04-11 NOTE — HISTORY OF PRESENT ILLNESS
[de-identified] : Patient is here for evaluation of right shoulder pain still having sig pain to shoulder  is scheduled for neck surgery  NAD Right shoulder: TTP ant GH joint, bicipital groove TTP trap and neck FF 0-110 ER 40 IR L5 Pain with terminal rom Weakness to abduction and ER Pos Impingement Pos Manley Pos Cross Arm Adduction Negative instability Positive scapula dyskinesia  plan went over findings explained the imaging from before will cont cons tx for shoulder as her pain may be related to her neck pathology cont care with NSx - this is medically necessary as her neck pain is affecting her shoulder function fu in 3-4 months  wc case, unable to work, not wokring with a temp total disability

## 2024-04-11 NOTE — PHYSICAL EXAM
[General Appearance - Well Developed] : well developed [General Appearance - Well Nourished] : well nourished [General Appearance - In No Acute Distress] : no acute distress [Abdomen Tenderness] : non-tender [Abdomen Soft] : soft [Costovertebral Angle Tenderness] : no ~M costovertebral angle tenderness [Oriented To Time, Place, And Person] : oriented to person, place, and time [FreeTextEntry1] :  Full physical was not done as recently done.

## 2024-04-11 NOTE — HISTORY OF PRESENT ILLNESS
[FreeTextEntry1] : 38 year old female patient seen for evaluation of urinary frequency and urgency, but no definitive urge incontinence. She has mild stress incontinence  Most of the symptoms started after the birth of her first child. It was not a complicated delivery per se. She has nocturia x3. She feels that she does not empty well. Her daytime voiding is variable ranging every few hours to sometimes every few minutes. Denies history of UTIs or gross hematuria. She does have a history of one passed stone during one of her pregnancies.   PMHx: Cervical disc disease, hypertension felt related to it PSHx: Right knee, right shoulder, cholecystecomy, cervical pharemenotemy ALG: Sulfa, iodine, contrast, includes anaphylaxis

## 2024-05-14 ENCOUNTER — NON-APPOINTMENT (OUTPATIENT)
Age: 39
End: 2024-05-14

## 2024-05-28 ENCOUNTER — APPOINTMENT (OUTPATIENT)
Dept: UROLOGY | Facility: CLINIC | Age: 39
End: 2024-05-28
Payer: MEDICAID

## 2024-05-28 DIAGNOSIS — N32.81 OVERACTIVE BLADDER: ICD-10-CM

## 2024-05-28 DIAGNOSIS — M79.2 NEURALGIA AND NEURITIS, UNSPECIFIED: ICD-10-CM

## 2024-05-28 DIAGNOSIS — N20.0 CALCULUS OF KIDNEY: ICD-10-CM

## 2024-05-28 PROCEDURE — 99213 OFFICE O/P EST LOW 20 MIN: CPT

## 2024-05-28 NOTE — ASSESSMENT
[FreeTextEntry1] :  39 year old female patient with frequency and urgency with large output. We discussed decreasing fluid intake. She probably only needs to put out 2-2.5 L of fluid daily. I ordered a KUB to see if stones are visible and get an accurate measurement. If stones are visible, we can consider ESWL. She will follow-up in 2-3 months after KUB.  All of the patient's questions were otherwise answered. Patient seen with ISAÍAS Macdonald.    The submitted E/M billing level for this visit reflects the total time spent on the day of the visit including face-to-face time spent with the patient, non-face-to-face review of medical records and relevant information, documentation, and asynchronous communication with the patient after a visit via phone, email, or patients EHR portal after the visit. The medical records reviewed are either scanned into the chart or reviewed with the patient using a patients electronic medical records portal for patients with records not available to [facility] via electronic transmission platforms from other institutions and labs. Time spent counseling and performing coordination of care was also included in determining the appropriate EM billing level.   I have reviewed and verified information regarding the chief complaint and history recorded by the ancillary staff and/or the patient. I have independently reviewed and interpreted tests performed by other physicians and facilities as necessary.   I have discussed with the patient differential diagnosis, reason for auxiliary tests if ordered, risks, benefits, alternatives, and complications of each form of therapy were discussed.  I personally performed the services described in the documentation, reviewed the documentation recorded by the scribe in my presence, and it accurately and completely records my words and actions.

## 2024-05-28 NOTE — ADDENDUM
[FreeTextEntry1] :  MANUELA DONG, am scribing for and in the presence of  in the following sections: HISTORY OF PRESENT ILLNESS, PAST MEDICAL/FAMILY/SOCIAL HISTORY, REVIEW OF SYSTEMS, VITAL SIGNS, PHYSICAL EXAM, ASSESSMENT/PLAN on 05/28/2024.

## 2024-05-28 NOTE — HISTORY OF PRESENT ILLNESS
[FreeTextEntry1] :  39 year old female patient with frequency and urgency who comes in with a voiding diary which shows a urine output of almost 3L a day. Pt states she does not really drink much fluids. She also had a renal and bladder ultrasound which showed bilateral stones.

## 2024-05-30 PROBLEM — N32.81 OAB (OVERACTIVE BLADDER): Status: ACTIVE | Noted: 2024-04-08

## 2024-05-30 PROBLEM — M79.2 NEUROPATHIC PAIN: Status: ACTIVE | Noted: 2023-11-13

## 2024-05-30 LAB
BILIRUB UR QL STRIP: NORMAL
COLLECTION METHOD: NORMAL
GLUCOSE UR-MCNC: NORMAL
HCG UR QL: 1 EU/DL
HGB UR QL STRIP.AUTO: NORMAL
KETONES UR-MCNC: NORMAL
LEUKOCYTE ESTERASE UR QL STRIP: NORMAL
NITRITE UR QL STRIP: NORMAL
PH UR STRIP: 6
PROT UR STRIP-MCNC: NORMAL
SP GR UR STRIP: 1.02

## 2024-06-03 ENCOUNTER — APPOINTMENT (OUTPATIENT)
Dept: NEUROSURGERY | Facility: CLINIC | Age: 39
End: 2024-06-03

## 2024-06-03 ENCOUNTER — NON-APPOINTMENT (OUTPATIENT)
Age: 39
End: 2024-06-03

## 2024-06-03 VITALS — HEIGHT: 66 IN | BODY MASS INDEX: 32.14 KG/M2 | WEIGHT: 200 LBS

## 2024-06-03 PROCEDURE — 99213 OFFICE O/P EST LOW 20 MIN: CPT

## 2024-06-05 ENCOUNTER — NON-APPOINTMENT (OUTPATIENT)
Age: 39
End: 2024-06-05

## 2024-06-06 NOTE — HISTORY OF PRESENT ILLNESS
-- DO NOT REPLY / DO NOT REPLY ALL --  -- This inbox is not monitored  -- Message is from Engagement Center Operations (ECO) --    General Patient Message: Patient states 06/06/2024 she saw SNEHAL Porter at [facility] on 05/30/2024 and that they were going to call her about removing her stiches from her left eye at the end of this week. Please call Patient back today to discuss. Thank you.    Caller Information         Type Contact Phone/Fax    06/06/2024 02:55 PM CDT Phone (Incoming) Hoda Boston (Self) [phone] (H)            Alternative phone number: No    Can a detailed message be left? Yes - Voicemail                   [FreeTextEntry1] : This is a 37-year-old female who presents for an initial postoperative encounter status post a right C5-6 which took place on February 17, 2023.  She continues to improve postoperatively with notable improvements with regards to her constant radicular features into the right upper extremity.  Prior to surgery, she had experienced an intensity of sharp thunderbolt like pains which have now since resolved.  A fairly constant underlying shooting discomfort noted into the right arm but once again this is a vast improvement in comparison to her preoperative complaints.\par \par Isolated cervical pain noted with stiffness which she attributes in part due to her suture placement which has caused some irritation.\par \par WOUND: Area of the posterior cervical region inspected with a running suture noted to be in place.  I have removed the sutures without incident and the patient tolerated the procedure quite well.  There is a small area of skin breakdown along the inferior aspect of the wound for which I have suggested she apply bacitracin to the site.  Also, there is a slight keloid type appearance to her wound which she notes that she has a predisposition to.

## 2024-06-07 ENCOUNTER — APPOINTMENT (RX ONLY)
Dept: URBAN - METROPOLITAN AREA OTHER 7 | Facility: OTHER | Age: 39
Setting detail: DERMATOLOGY
End: 2024-06-07

## 2024-06-07 VITALS — WEIGHT: 228 LBS | HEIGHT: 66 IN

## 2024-06-07 DIAGNOSIS — Z41.1 ENCOUNTER FOR COSMETIC SURGERY: ICD-10-CM

## 2024-06-07 PROCEDURE — ? CONSULTATION - BLEPHAROPLASTY

## 2024-07-07 ENCOUNTER — NON-APPOINTMENT (OUTPATIENT)
Age: 39
End: 2024-07-07

## 2024-07-09 ENCOUNTER — APPOINTMENT (RX ONLY)
Dept: URBAN - METROPOLITAN AREA CLINIC 41 | Facility: CLINIC | Age: 39
Setting detail: DERMATOLOGY
End: 2024-07-09

## 2024-07-09 ENCOUNTER — APPOINTMENT (OUTPATIENT)
Dept: UROLOGY | Facility: CLINIC | Age: 39
End: 2024-07-09
Payer: MEDICAID

## 2024-07-09 VITALS
TEMPERATURE: 98.6 F | BODY MASS INDEX: 32.14 KG/M2 | SYSTOLIC BLOOD PRESSURE: 129 MMHG | HEIGHT: 66 IN | OXYGEN SATURATION: 98 % | HEART RATE: 115 BPM | DIASTOLIC BLOOD PRESSURE: 81 MMHG | RESPIRATION RATE: 16 BRPM | WEIGHT: 200 LBS

## 2024-07-09 DIAGNOSIS — M79.2 NEURALGIA AND NEURITIS, UNSPECIFIED: ICD-10-CM

## 2024-07-09 DIAGNOSIS — L73.8 OTHER SPECIFIED FOLLICULAR DISORDERS: ICD-10-CM

## 2024-07-09 DIAGNOSIS — L91.8 OTHER HYPERTROPHIC DISORDERS OF THE SKIN: ICD-10-CM

## 2024-07-09 DIAGNOSIS — R35.0 FREQUENCY OF MICTURITION: ICD-10-CM

## 2024-07-09 DIAGNOSIS — N20.0 CALCULUS OF KIDNEY: ICD-10-CM

## 2024-07-09 DIAGNOSIS — L82.1 OTHER SEBORRHEIC KERATOSIS: ICD-10-CM

## 2024-07-09 PROBLEM — D23.5 OTHER BENIGN NEOPLASM OF SKIN OF TRUNK: Status: ACTIVE | Noted: 2024-07-09

## 2024-07-09 PROBLEM — D23.61 OTHER BENIGN NEOPLASM OF SKIN OF RIGHT UPPER LIMB, INCLUDING SHOULDER: Status: ACTIVE | Noted: 2024-07-09

## 2024-07-09 PROCEDURE — 99203 OFFICE O/P NEW LOW 30 MIN: CPT | Mod: 25

## 2024-07-09 PROCEDURE — ? SKIN TAG REMOVAL

## 2024-07-09 PROCEDURE — 11200 RMVL SKIN TAGS UP TO&INC 15: CPT

## 2024-07-09 PROCEDURE — 99214 OFFICE O/P EST MOD 30 MIN: CPT

## 2024-07-09 PROCEDURE — ? TREATMENT REGIMEN

## 2024-07-09 PROCEDURE — G2211 COMPLEX E/M VISIT ADD ON: CPT | Mod: NC,1L

## 2024-07-09 PROCEDURE — ? COUNSELING

## 2024-07-09 ASSESSMENT — LOCATION SIMPLE DESCRIPTION DERM
LOCATION SIMPLE: RIGHT CHEEK
LOCATION SIMPLE: LEFT ANTERIOR NECK
LOCATION SIMPLE: RIGHT FOREHEAD
LOCATION SIMPLE: RIGHT SUBMANDIBULAR AREA
LOCATION SIMPLE: LEFT FOREHEAD

## 2024-07-09 ASSESSMENT — LOCATION DETAILED DESCRIPTION DERM
LOCATION DETAILED: RIGHT SUBMANDIBULAR AREA
LOCATION DETAILED: LEFT INFERIOR FOREHEAD
LOCATION DETAILED: RIGHT LATERAL MALAR CHEEK
LOCATION DETAILED: LEFT CLAVICULAR NECK
LOCATION DETAILED: RIGHT MEDIAL FOREHEAD

## 2024-07-09 ASSESSMENT — LOCATION ZONE DERM
LOCATION ZONE: NECK
LOCATION ZONE: FACE

## 2024-07-09 NOTE — HPI: EVALUATION OF SKIN LESION(S)
What Type Of Note Output Would You Prefer (Optional)?: Standard Output
Hpi Title: Evaluation of Skin Lesions
How Severe Are Your Spot(S)?: mild
Have Your Spot(S) Been Treated In The Past?: has not been treated
Family Member: Mother
Additional History: Patient is here for upper body. She has various spots of concern.

## 2024-07-09 NOTE — PROCEDURE: SKIN TAG REMOVAL
Medical Necessity Information: It is in your best interest to select a reason for this procedure from the list below. All of these items fulfill various CMS LCD requirements except the new and changing color options.
Consent: Written consent obtained and the risks of skin tag removal was reviewed with the patient including but not limited to bleeding, pigmentary change, infection, pain, and remote possibility of scarring.
Add Associated Diagnoses If Applicable When Selecting Medical Necessity: Yes
Detail Level: Detailed
Include Z78.9 (Other Specified Conditions Influencing Health Status) As An Associated Diagnosis?: No
Anesthesia Type: 1% lidocaine with epinephrine
Medical Necessity Clause: This procedure was medically necessary because the lesions that were treated were:
Anesthesia Volume In Cc: 0.5

## 2024-07-09 NOTE — PROCEDURE: TREATMENT REGIMEN
(1) Outpatient Area
Plan: Discussed can be removed cosmetically with electrocautery
Detail Level: Zone

## 2024-07-18 PROBLEM — R35.0 URINE FREQUENCY: Status: ACTIVE | Noted: 2024-07-09

## 2024-07-18 LAB
COLLECTION METHOD: NORMAL
GLUCOSE UR-MCNC: NORMAL
HCG UR QL: 0.2 EU/DL
HGB UR QL STRIP.AUTO: NORMAL
KETONES UR-MCNC: NORMAL
LEUKOCYTE ESTERASE UR QL STRIP: NORMAL
PH UR STRIP: 5.5
PROT UR STRIP-MCNC: NORMAL
SP GR UR STRIP: 1.02

## 2024-07-31 ENCOUNTER — EMERGENCY (EMERGENCY)
Facility: HOSPITAL | Age: 39
LOS: 0 days | Discharge: HOME | End: 2024-08-01
Attending: EMERGENCY MEDICINE
Payer: COMMERCIAL

## 2024-07-31 VITALS
SYSTOLIC BLOOD PRESSURE: 157 MMHG | OXYGEN SATURATION: 99 % | RESPIRATION RATE: 18 BRPM | HEART RATE: 111 BPM | DIASTOLIC BLOOD PRESSURE: 107 MMHG | TEMPERATURE: 98 F

## 2024-07-31 DIAGNOSIS — Z91.041 RADIOGRAPHIC DYE ALLERGY STATUS: ICD-10-CM

## 2024-07-31 DIAGNOSIS — Z98.890 OTHER SPECIFIED POSTPROCEDURAL STATES: Chronic | ICD-10-CM

## 2024-07-31 DIAGNOSIS — Z90.49 ACQUIRED ABSENCE OF OTHER SPECIFIED PARTS OF DIGESTIVE TRACT: Chronic | ICD-10-CM

## 2024-07-31 DIAGNOSIS — M54.12 RADICULOPATHY, CERVICAL REGION: ICD-10-CM

## 2024-07-31 DIAGNOSIS — Z88.2 ALLERGY STATUS TO SULFONAMIDES: ICD-10-CM

## 2024-07-31 PROCEDURE — 99284 EMERGENCY DEPT VISIT MOD MDM: CPT

## 2024-07-31 PROCEDURE — 96372 THER/PROPH/DIAG INJ SC/IM: CPT

## 2024-07-31 PROCEDURE — 99283 EMERGENCY DEPT VISIT LOW MDM: CPT | Mod: 25

## 2024-07-31 RX ORDER — DEXAMETHASONE 1 MG/1
10 TABLET ORAL ONCE
Refills: 0 | Status: COMPLETED | OUTPATIENT
Start: 2024-07-31 | End: 2024-07-31

## 2024-07-31 RX ORDER — KETOROLAC TROMETHAMINE 30 MG/ML
30 INJECTION, SOLUTION INTRAMUSCULAR ONCE
Refills: 0 | Status: DISCONTINUED | OUTPATIENT
Start: 2024-07-31 | End: 2024-07-31

## 2024-07-31 RX ORDER — DIAZEPAM 10 MG/1
5 TABLET ORAL ONCE
Refills: 0 | Status: DISCONTINUED | OUTPATIENT
Start: 2024-07-31 | End: 2024-07-31

## 2024-07-31 RX ADMIN — DEXAMETHASONE 10 MILLIGRAM(S): 1 TABLET ORAL at 23:23

## 2024-07-31 RX ADMIN — KETOROLAC TROMETHAMINE 30 MILLIGRAM(S): 30 INJECTION, SOLUTION INTRAMUSCULAR at 23:23

## 2024-07-31 RX ADMIN — DIAZEPAM 5 MILLIGRAM(S): 10 TABLET ORAL at 23:23

## 2024-08-01 RX ORDER — OXYCODONE HYDROCHLORIDE 100 MG/5ML
5 SOLUTION ORAL ONCE
Refills: 0 | Status: DISCONTINUED | OUTPATIENT
Start: 2024-08-01 | End: 2024-08-01

## 2024-08-01 RX ADMIN — KETOROLAC TROMETHAMINE 30 MILLIGRAM(S): 30 INJECTION, SOLUTION INTRAMUSCULAR at 00:40

## 2024-08-01 RX ADMIN — OXYCODONE HYDROCHLORIDE 5 MILLIGRAM(S): 100 SOLUTION ORAL at 00:40

## 2024-08-01 NOTE — ED PROVIDER NOTE - PHYSICAL EXAMINATION
VITAL SIGNS: I have reviewed nursing notes and confirm.  CONSTITUTIONAL: Well-developed; well-nourished; in no acute distress.  SKIN: Skin exam is warm and dry, no acute rash.  HEAD: Normocephalic; atraumatic.  EYES: PERRL, EOM intact; conjunctiva and sclera clear.  ENT: No nasal discharge; airway clear.   NECK: spine nt, R trapzius spasm, tender. no mass/lad.   CARD:+ S1, S2   RESP: No wheezes, rales or rhonchi.  ABD: Normal bowel sounds; soft; non-distended; non-tender;  EXT: Normal ROM. No cyanosis or edema.  LYMPH: No acute adenopathy.  NEURO: Alert. Grossly unremarkable. No focal deficits.  PSYCH: Cooperative, appropriate.

## 2024-08-01 NOTE — ED PROVIDER NOTE - NSFOLLOWUPINSTRUCTIONS_ED_ALL_ED_FT
Continue your medications as prescribed.    Please follow up with Dr Hester this week.    Return for worsening symptoms.

## 2024-08-01 NOTE — ED PROVIDER NOTE - PATIENT PORTAL LINK FT
You can access the FollowMyHealth Patient Portal offered by [facility] by registering at the following website: [email or web address]. By joining [employer]’s FollowMyHealth portal, you will also be able to view your health information using other applications (apps) compatible with our system.

## 2024-08-01 NOTE — ED PROVIDER NOTE - OBJECTIVE STATEMENT
40 yo f w hx chronic neck pain, co R sided trapezius pain. worse w movement. better w rest. non radiating. spasm like. no acute trauma. no fever, numbness, weakness, ha, cp, sob.

## 2024-08-07 ENCOUNTER — NON-APPOINTMENT (OUTPATIENT)
Age: 39
End: 2024-08-07

## 2024-08-08 ENCOUNTER — APPOINTMENT (OUTPATIENT)
Dept: NEUROSURGERY | Facility: CLINIC | Age: 39
End: 2024-08-08

## 2024-08-08 PROCEDURE — 99213 OFFICE O/P EST LOW 20 MIN: CPT

## 2024-08-08 NOTE — HISTORY OF PRESENT ILLNESS
[FreeTextEntry1] : 39-year-old female presenting to the neurosurgery office today as a follow-up, post ER discharge.  Patient has a longstanding history of cervical pain with right upper extremity radiculopathy. She is status post right C5-6 laminoforaminotomy performed on 2/17/2023. She is tearful today as her pain level is 10/10. Ms. CLARK presented to the emergency department as she has several times in the past seeking urgent relief of her discomfort. She endorses a sharp, shooting sensation to the right upper extremity. The right side of her neck is tender to palpation. She also has discomfort to the left however the right is worse. Physical therapy has failed to provide any relief of pain. She consults often with her pain management specialist who is also unable to alleviate her discomfort with oral analgesics or interventional procedures.   It has been discussed several times in the past that she is a candidate for C4-6 ACDF however this has been denied by her insurance, [employer]er's Compensation.  Ms. CLARK has failed all conservative management. She has had multiple emergency room visits. Patient has agreed to trial a spinal cord stimulator. Dr. Andres will be contacted by Dr. Hester to discuss this plan. Patient will return after, aware that she may contact us sooner if needed.

## 2024-08-08 NOTE — ASSESSMENT
[FreeTextEntry1] : 39-year-old female in profound discomfort status post work-related injury. Neck pain with radicular features to the bilateral upper extremities, right worse than left. She is a candidate for a C4-6 ACDF however authorization has been denied several times. A SCS trial is warranted as she has failed all conservative management and presents to the ER often seeking help. This will be scheduled.   She will return after SCS trial to assess her status and discuss the option of permanent implantation, depending upon her trial results.   Jovita Agee MS, FNP-BC Jodi Hester MD, FAANS

## 2024-08-29 ENCOUNTER — NON-APPOINTMENT (OUTPATIENT)
Age: 39
End: 2024-08-29

## 2024-08-29 ENCOUNTER — APPOINTMENT (OUTPATIENT)
Dept: ORTHOPEDIC SURGERY | Facility: CLINIC | Age: 39
End: 2024-08-29
Payer: OTHER MISCELLANEOUS

## 2024-08-29 VITALS — BODY MASS INDEX: 32.14 KG/M2 | WEIGHT: 200 LBS | HEIGHT: 66 IN

## 2024-08-29 DIAGNOSIS — M25.511 PAIN IN RIGHT SHOULDER: ICD-10-CM

## 2024-08-29 PROCEDURE — 99213 OFFICE O/P EST LOW 20 MIN: CPT

## 2024-08-29 NOTE — HISTORY OF PRESENT ILLNESS
[de-identified] : Patient is here for evaluation of right shoulder pain still having sig pain to shoulder  is seeing NSx for her neck  NAD Right shoulder: TTP ant GH joint, bicipital groove TTP trap and neck FF 0-110 ER 40 IR L5 Pain with terminal rom Weakness to abduction and ER Pos Impingement Pos Manley Pos Cross Arm Adduction Negative instability Positive scapula dyskinesia  plan went over findings explained the imaging from before will cont cons tx for shoulder as her pain may be related to her neck pathology cont care with NSx - this is medically necessary as her neck pain is affecting her shoulder function fu in 3-4 months  wc case, unable to work, not wokring with a temp total disability

## 2024-10-14 ENCOUNTER — EMERGENCY (EMERGENCY)
Facility: HOSPITAL | Age: 39
LOS: 0 days | Discharge: HOME | End: 2024-10-14
Attending: EMERGENCY MEDICINE
Payer: COMMERCIAL

## 2024-10-14 VITALS
TEMPERATURE: 98 F | RESPIRATION RATE: 18 BRPM | DIASTOLIC BLOOD PRESSURE: 91 MMHG | HEART RATE: 96 BPM | SYSTOLIC BLOOD PRESSURE: 129 MMHG | OXYGEN SATURATION: 100 %

## 2024-10-14 VITALS
HEART RATE: 96 BPM | OXYGEN SATURATION: 100 % | DIASTOLIC BLOOD PRESSURE: 85 MMHG | RESPIRATION RATE: 16 BRPM | WEIGHT: 205.91 LBS | SYSTOLIC BLOOD PRESSURE: 137 MMHG | HEIGHT: 66 IN | TEMPERATURE: 99 F

## 2024-10-14 DIAGNOSIS — R11.2 NAUSEA WITH VOMITING, UNSPECIFIED: ICD-10-CM

## 2024-10-14 DIAGNOSIS — Z98.890 OTHER SPECIFIED POSTPROCEDURAL STATES: Chronic | ICD-10-CM

## 2024-10-14 DIAGNOSIS — Z91.041 RADIOGRAPHIC DYE ALLERGY STATUS: ICD-10-CM

## 2024-10-14 DIAGNOSIS — J45.909 UNSPECIFIED ASTHMA, UNCOMPLICATED: ICD-10-CM

## 2024-10-14 DIAGNOSIS — R10.13 EPIGASTRIC PAIN: ICD-10-CM

## 2024-10-14 DIAGNOSIS — F43.10 POST-TRAUMATIC STRESS DISORDER, UNSPECIFIED: ICD-10-CM

## 2024-10-14 DIAGNOSIS — M47.812 SPONDYLOSIS WITHOUT MYELOPATHY OR RADICULOPATHY, CERVICAL REGION: ICD-10-CM

## 2024-10-14 DIAGNOSIS — Z88.2 ALLERGY STATUS TO SULFONAMIDES: ICD-10-CM

## 2024-10-14 DIAGNOSIS — Z97.5 PRESENCE OF (INTRAUTERINE) CONTRACEPTIVE DEVICE: ICD-10-CM

## 2024-10-14 DIAGNOSIS — F41.9 ANXIETY DISORDER, UNSPECIFIED: ICD-10-CM

## 2024-10-14 DIAGNOSIS — Z90.49 ACQUIRED ABSENCE OF OTHER SPECIFIED PARTS OF DIGESTIVE TRACT: Chronic | ICD-10-CM

## 2024-10-14 LAB
ALBUMIN SERPL ELPH-MCNC: 4.6 G/DL — SIGNIFICANT CHANGE UP (ref 3.5–5.2)
ALP SERPL-CCNC: 178 U/L — HIGH (ref 30–115)
ALT FLD-CCNC: 14 U/L — SIGNIFICANT CHANGE UP (ref 0–41)
ANION GAP SERPL CALC-SCNC: 13 MMOL/L — SIGNIFICANT CHANGE UP (ref 7–14)
AST SERPL-CCNC: 11 U/L — SIGNIFICANT CHANGE UP (ref 0–41)
BASOPHILS # BLD AUTO: 0.06 K/UL — SIGNIFICANT CHANGE UP (ref 0–0.2)
BASOPHILS NFR BLD AUTO: 0.4 % — SIGNIFICANT CHANGE UP (ref 0–1)
BILIRUB SERPL-MCNC: 0.3 MG/DL — SIGNIFICANT CHANGE UP (ref 0.2–1.2)
BUN SERPL-MCNC: 10 MG/DL — SIGNIFICANT CHANGE UP (ref 10–20)
CALCIUM SERPL-MCNC: 9.3 MG/DL — SIGNIFICANT CHANGE UP (ref 8.4–10.5)
CHLORIDE SERPL-SCNC: 107 MMOL/L — SIGNIFICANT CHANGE UP (ref 98–110)
CO2 SERPL-SCNC: 24 MMOL/L — SIGNIFICANT CHANGE UP (ref 17–32)
CREAT SERPL-MCNC: 0.8 MG/DL — SIGNIFICANT CHANGE UP (ref 0.7–1.5)
EGFR: 95 ML/MIN/1.73M2 — SIGNIFICANT CHANGE UP
EOSINOPHIL # BLD AUTO: 0.01 K/UL — SIGNIFICANT CHANGE UP (ref 0–0.7)
EOSINOPHIL NFR BLD AUTO: 0.1 % — SIGNIFICANT CHANGE UP (ref 0–8)
GLUCOSE SERPL-MCNC: 88 MG/DL — SIGNIFICANT CHANGE UP (ref 70–99)
HCG SERPL QL: NEGATIVE — SIGNIFICANT CHANGE UP
HCT VFR BLD CALC: 44.1 % — SIGNIFICANT CHANGE UP (ref 37–47)
HGB BLD-MCNC: 13.8 G/DL — SIGNIFICANT CHANGE UP (ref 12–16)
IMM GRANULOCYTES NFR BLD AUTO: 0.3 % — SIGNIFICANT CHANGE UP (ref 0.1–0.3)
LIDOCAIN IGE QN: 17 U/L — SIGNIFICANT CHANGE UP (ref 7–60)
LYMPHOCYTES # BLD AUTO: 14.9 % — LOW (ref 20.5–51.1)
LYMPHOCYTES # BLD AUTO: 2.19 K/UL — SIGNIFICANT CHANGE UP (ref 1.2–3.4)
MCHC RBC-ENTMCNC: 27.3 PG — SIGNIFICANT CHANGE UP (ref 27–31)
MCHC RBC-ENTMCNC: 31.3 G/DL — LOW (ref 32–37)
MCV RBC AUTO: 87.2 FL — SIGNIFICANT CHANGE UP (ref 81–99)
MONOCYTES # BLD AUTO: 0.78 K/UL — HIGH (ref 0.1–0.6)
MONOCYTES NFR BLD AUTO: 5.3 % — SIGNIFICANT CHANGE UP (ref 1.7–9.3)
NEUTROPHILS # BLD AUTO: 11.61 K/UL — HIGH (ref 1.4–6.5)
NEUTROPHILS NFR BLD AUTO: 79 % — HIGH (ref 42.2–75.2)
NRBC # BLD: 0 /100 WBCS — SIGNIFICANT CHANGE UP (ref 0–0)
PLATELET # BLD AUTO: 302 K/UL — SIGNIFICANT CHANGE UP (ref 130–400)
PMV BLD: 11.6 FL — HIGH (ref 7.4–10.4)
POTASSIUM SERPL-MCNC: 3.8 MMOL/L — SIGNIFICANT CHANGE UP (ref 3.5–5)
POTASSIUM SERPL-SCNC: 3.8 MMOL/L — SIGNIFICANT CHANGE UP (ref 3.5–5)
PROT SERPL-MCNC: 7.1 G/DL — SIGNIFICANT CHANGE UP (ref 6–8)
RBC # BLD: 5.06 M/UL — SIGNIFICANT CHANGE UP (ref 4.2–5.4)
RBC # FLD: 14.6 % — HIGH (ref 11.5–14.5)
SODIUM SERPL-SCNC: 144 MMOL/L — SIGNIFICANT CHANGE UP (ref 135–146)
WBC # BLD: 14.7 K/UL — HIGH (ref 4.8–10.8)
WBC # FLD AUTO: 14.7 K/UL — HIGH (ref 4.8–10.8)

## 2024-10-14 PROCEDURE — 99284 EMERGENCY DEPT VISIT MOD MDM: CPT

## 2024-10-14 PROCEDURE — 36415 COLL VENOUS BLD VENIPUNCTURE: CPT

## 2024-10-14 PROCEDURE — 84703 CHORIONIC GONADOTROPIN ASSAY: CPT

## 2024-10-14 PROCEDURE — 85025 COMPLETE CBC W/AUTO DIFF WBC: CPT

## 2024-10-14 PROCEDURE — 74176 CT ABD & PELVIS W/O CONTRAST: CPT | Mod: 26,MC

## 2024-10-14 PROCEDURE — 99284 EMERGENCY DEPT VISIT MOD MDM: CPT | Mod: 25

## 2024-10-14 PROCEDURE — 74176 CT ABD & PELVIS W/O CONTRAST: CPT | Mod: MC

## 2024-10-14 PROCEDURE — 96374 THER/PROPH/DIAG INJ IV PUSH: CPT

## 2024-10-14 PROCEDURE — 80053 COMPREHEN METABOLIC PANEL: CPT

## 2024-10-14 PROCEDURE — 83690 ASSAY OF LIPASE: CPT

## 2024-10-14 RX ORDER — ONDANSETRON HCL/PF 4 MG/2 ML
4 VIAL (ML) INJECTION ONCE
Refills: 0 | Status: COMPLETED | OUTPATIENT
Start: 2024-10-14 | End: 2024-10-14

## 2024-10-14 RX ORDER — SODIUM CHLORIDE 0.9 % (FLUSH) 0.9 %
1000 SYRINGE (ML) INJECTION ONCE
Refills: 0 | Status: COMPLETED | OUTPATIENT
Start: 2024-10-14 | End: 2024-10-14

## 2024-10-14 RX ORDER — ONDANSETRON HCL/PF 4 MG/2 ML
4 VIAL (ML) INJECTION ONCE
Refills: 0 | Status: DISCONTINUED | OUTPATIENT
Start: 2024-10-14 | End: 2024-10-14

## 2024-10-14 RX ADMIN — Medication 1000 MILLILITER(S): at 11:30

## 2024-10-14 RX ADMIN — Medication 4 MILLIGRAM(S): at 16:14

## 2024-10-14 RX ADMIN — Medication 4 MILLIGRAM(S): at 11:43

## 2024-10-14 NOTE — ED PROVIDER NOTE - PROGRESS NOTE DETAILS
ct negative pt wants to be dc'ed, will codybe zofran I explained to patient that iud on ct might not be in place, pt has f/u next week and will discuss. She feels it is working as also recently had it checked

## 2024-10-14 NOTE — ED PROVIDER NOTE - NSFOLLOWUPINSTRUCTIONS_ED_ALL_ED_FT
Please follow up with your pain management specialist for medication adjustments regarding chronic pain.       Nausea is the feeling that you have an upset stomach or that you are about to vomit. As nausea gets worse, it can lead to vomiting. Vomiting is when stomach contents forcefully come out of your mouth as a result of nausea. Vomiting can make you feel weak and cause you to become dehydrated.    Dehydration can make you feel tired and thirsty, cause you to have a dry mouth, and decrease how often you urinate. Older adults and people with other diseases or a weak disease-fighting system (immune system) are at higher risk for dehydration. It is important to treat your nausea and vomiting as told by your health care provider.    Follow these instructions at home:  Watch your symptoms for any changes. Tell your health care provider about them.    Eating and drinking    A bottle of clear fruit juice and glass of water.  A sign showing that a person should not drink alcohol.  Take an oral rehydration solution (ORS). This is a drink that is sold at pharmacies and retail stores.  Drink clear fluids slowly and in small amounts as you are able. Clear fluids include water, ice chips, low-calorie sports drinks, and fruit juice that has water added (diluted fruit juice).  Eat bland, easy-to-digest foods in small amounts as you are able. These foods include bananas, applesauce, rice, lean meats, toast, and crackers.  Avoid fluids that contain a lot of sugar or caffeine, such as energy drinks, sports drinks, and soda.  Avoid alcohol.  Avoid spicy or fatty foods.  General instructions    Take over-the-counter and prescription medicines only as told by your health care provider.  Drink enough fluid to keep your urine pale yellow.  Wash your hands often using soap and water for at least 20 seconds. If soap and water are not available, use hand .  Make sure that everyone in your household washes their hands well and often.  Rest at home while you recover.  Watch your condition for any changes.  Take slow and deep breaths when you feel nauseous.  Keep all follow-up visits. This is important.  Contact a health care provider if:  Your symptoms get worse.  You have new symptoms.  You have a fever.  You cannot drink fluids without vomiting.  Your nausea does not go away after 2 days.  You feel light-headed or dizzy.  You have a headache.  You have muscle cramps.  You have a rash.  You have pain while urinating.  Get help right away if:  You have pain in your chest, neck, arm, or jaw.  You feel extremely weak or you faint.  You have persistent vomiting.  You have vomit that is bright red or looks like black coffee grounds.  You have bloody or black stools (feces) or stools that look like tar.  You have a severe headache, a stiff neck, or both.  You have severe pain, cramping, or bloating in your abdomen.  You have difficulty breathing, or you are breathing very quickly.  Your heart is beating very quickly.  Your skin feels cold and clammy.  You feel confused.  You have signs of dehydration, such as:  Dark urine, very little urine, or no urine.  Cracked lips.  Dry mouth.  Sunken eyes.  Sleepiness.  Weakness.  These symptoms may be an emergency. Get help right away. Call 911.  Do not wait to see if the symptoms will go away.  Do not drive yourself to the hospital.  Summary  Nausea is the feeling that you have an upset stomach or that you are about to vomit. As nausea gets worse, it can lead to vomiting. Vomiting can make you feel weak and cause you to become dehydrated.  Follow instructions from your health care provider about eating and drinking to prevent dehydration.  Take over-the-counter and prescription medicines only as told by your health care provider.  Contact your health care provider if your symptoms get worse, or you have new symptoms.  Keep all follow-up visits. This is important.  This information is not intended to replace advice given to you by your health care provider. Make sure you discuss any questions you have with your health care provider.

## 2024-10-14 NOTE — ED PROVIDER NOTE - OBJECTIVE STATEMENT
39-year-old female past medical history of cervical spondylosis, migraines, anxiety, PTSD, asthma presents with nausea/vomiting for 1 week.  The patient states that she had her chronic pain medications adjusted and since then has been feeling unwell with nausea.  She states that she had her Topamax increased and her Lyrica decreased. She reports developing epigastric abdominal pain 3 days ago. States the abdominal pain has not improved. Still feeling nauseated. No chest pain, shortness of breath, fevers, chills, back pain, recent illnesses, sick contacts. She reports decreased PO intake. Patient is followed by Dr. Hester for her chronic pain.

## 2024-10-14 NOTE — ED PROVIDER NOTE - PATIENT PORTAL LINK FT
You can access the FollowMyHealth Patient Portal offered by [facility] by registering at the following website: [email or web address]. By joining [employer]’s FollowMyHealth portal, you will also be able to view your health information using other applications (apps) compatible with our system.

## 2024-10-14 NOTE — ED PROVIDER NOTE - PHYSICAL EXAMINATION
VITAL SIGNS: I have reviewed nursing notes and confirm.  CONSTITUTIONAL: well-appearing, non-toxic, NAD  SKIN: Warm dry, normal skin turgor  HEAD: NCAT  EYES: EOMI, PERRLA, no scleral icterus  ENT: dry mucous membranes, normal pharynx with no erythema or exudates  NECK: Supple; non tender. Full ROM. No cervical LAD  CARD: RRR, no murmurs, rubs or gallops  RESP: clear to ausculation b/l.  No rales, rhonchi, or wheezing.  ABD: soft, + BS, mild epigastric tenderness to palpation, non-distended, no rebound or guarding. No CVA tenderness  EXT: Full ROM, no bony tenderness, no pedal edema, no calf tenderness  NEURO: normal motor. normal sensory. CN II-XII intact. Cerebellar testing normal. Normal gait.  PSYCH: Cooperative, appropriate.

## 2024-10-14 NOTE — ED PROVIDER NOTE - CLINICAL SUMMARY MEDICAL DECISION MAKING FREE TEXT BOX
39-year-old female past medical history of cervical spondylosis, migraines, anxiety, PTSD, asthma presents with nausea/vomiting for 1 week.  The patient states that she had her chronic pain medications adjusted and since then has been feeling unwell with nausea. No fever, chills. here on exam no distress, s1 s2 ctab mild epigastric ttp w/o r/g.    impression  n/v mild abd pain. here screening labs wnl, pt well appearing. dc home on prn zofran. outpt f/u

## 2024-10-17 RX ORDER — ONDANSETRON HCL/PF 4 MG/2 ML
1 VIAL (ML) INJECTION
Qty: 8 | Refills: 0
Start: 2024-10-17 | End: 2024-10-20

## 2024-10-23 ENCOUNTER — APPOINTMENT (OUTPATIENT)
Dept: OBGYN | Facility: CLINIC | Age: 39
End: 2024-10-23
Payer: MEDICAID

## 2024-10-23 VITALS
BODY MASS INDEX: 33.11 KG/M2 | HEART RATE: 81 BPM | SYSTOLIC BLOOD PRESSURE: 129 MMHG | DIASTOLIC BLOOD PRESSURE: 80 MMHG | WEIGHT: 206 LBS | HEIGHT: 66 IN

## 2024-10-23 DIAGNOSIS — Z01.419 ENCOUNTER FOR GYNECOLOGICAL EXAMINATION (GENERAL) (ROUTINE) W/OUT ABNORMAL FINDINGS: ICD-10-CM

## 2024-10-23 PROCEDURE — 99386 PREV VISIT NEW AGE 40-64: CPT

## 2024-10-30 LAB
CYTOLOGY CVX/VAG DOC THIN PREP: NORMAL
HPV HIGH+LOW RISK DNA PNL CVX: NOT DETECTED

## 2024-11-17 ENCOUNTER — NON-APPOINTMENT (OUTPATIENT)
Age: 39
End: 2024-11-17

## 2024-12-06 ENCOUNTER — APPOINTMENT (OUTPATIENT)
Dept: UROLOGY | Facility: CLINIC | Age: 39
End: 2024-12-06
Payer: MEDICAID

## 2024-12-06 PROCEDURE — 64566 NEUROELTRD STIM POST TIBIAL: CPT

## 2024-12-13 ENCOUNTER — APPOINTMENT (OUTPATIENT)
Dept: UROLOGY | Facility: CLINIC | Age: 39
End: 2024-12-13
Payer: MEDICAID

## 2024-12-13 ENCOUNTER — NON-APPOINTMENT (OUTPATIENT)
Age: 39
End: 2024-12-13

## 2024-12-13 PROBLEM — N39.3 STRESS INCONTINENCE, FEMALE: Status: ACTIVE | Noted: 2024-12-06

## 2024-12-13 PROCEDURE — 64566 NEUROELTRD STIM POST TIBIAL: CPT

## 2024-12-19 ENCOUNTER — APPOINTMENT (OUTPATIENT)
Dept: ORTHOPEDIC SURGERY | Facility: CLINIC | Age: 39
End: 2024-12-19
Payer: OTHER MISCELLANEOUS

## 2024-12-19 ENCOUNTER — NON-APPOINTMENT (OUTPATIENT)
Age: 39
End: 2024-12-19

## 2024-12-19 DIAGNOSIS — M25.511 PAIN IN RIGHT SHOULDER: ICD-10-CM

## 2024-12-19 PROCEDURE — 99213 OFFICE O/P EST LOW 20 MIN: CPT

## 2024-12-20 ENCOUNTER — APPOINTMENT (OUTPATIENT)
Dept: UROLOGY | Facility: CLINIC | Age: 39
End: 2024-12-20

## 2024-12-20 DIAGNOSIS — N32.81 OVERACTIVE BLADDER: ICD-10-CM

## 2024-12-20 PROCEDURE — 64566 NEUROELTRD STIM POST TIBIAL: CPT

## 2024-12-27 ENCOUNTER — APPOINTMENT (OUTPATIENT)
Dept: UROLOGY | Facility: CLINIC | Age: 39
End: 2024-12-27
Payer: MEDICAID

## 2024-12-27 DIAGNOSIS — N39.3 STRESS INCONTINENCE (FEMALE) (MALE): ICD-10-CM

## 2024-12-27 PROCEDURE — 64566 NEUROELTRD STIM POST TIBIAL: CPT

## 2025-01-03 ENCOUNTER — APPOINTMENT (OUTPATIENT)
Dept: UROLOGY | Facility: CLINIC | Age: 40
End: 2025-01-03
Payer: MEDICAID

## 2025-01-03 ENCOUNTER — OUTPATIENT (OUTPATIENT)
Dept: OUTPATIENT SERVICES | Facility: HOSPITAL | Age: 40
LOS: 1 days | End: 2025-01-03
Payer: MEDICAID

## 2025-01-03 ENCOUNTER — APPOINTMENT (OUTPATIENT)
Age: 40
End: 2025-01-03
Payer: MEDICAID

## 2025-01-03 ENCOUNTER — LABORATORY RESULT (OUTPATIENT)
Age: 40
End: 2025-01-03

## 2025-01-03 VITALS
TEMPERATURE: 98.5 F | RESPIRATION RATE: 16 BRPM | SYSTOLIC BLOOD PRESSURE: 137 MMHG | WEIGHT: 204 LBS | HEART RATE: 97 BPM | HEIGHT: 66 IN | BODY MASS INDEX: 32.78 KG/M2 | DIASTOLIC BLOOD PRESSURE: 92 MMHG

## 2025-01-03 DIAGNOSIS — Z98.890 OTHER SPECIFIED POSTPROCEDURAL STATES: Chronic | ICD-10-CM

## 2025-01-03 DIAGNOSIS — D72.829 ELEVATED WHITE BLOOD CELL COUNT, UNSPECIFIED: ICD-10-CM

## 2025-01-03 DIAGNOSIS — Z90.49 ACQUIRED ABSENCE OF OTHER SPECIFIED PARTS OF DIGESTIVE TRACT: Chronic | ICD-10-CM

## 2025-01-03 DIAGNOSIS — J45.20 MILD INTERMITTENT ASTHMA, UNCOMPLICATED: ICD-10-CM

## 2025-01-03 PROCEDURE — 99204 OFFICE O/P NEW MOD 45 MIN: CPT

## 2025-01-03 PROCEDURE — 81206 BCR/ABL1 GENE MAJOR BP: CPT

## 2025-01-03 PROCEDURE — 86140 C-REACTIVE PROTEIN: CPT

## 2025-01-03 PROCEDURE — 81207 BCR/ABL1 GENE MINOR BP: CPT

## 2025-01-03 PROCEDURE — G0452: CPT | Mod: 26

## 2025-01-03 PROCEDURE — 80053 COMPREHEN METABOLIC PANEL: CPT

## 2025-01-03 PROCEDURE — 88189 FLOWCYTOMETRY/READ 16 & >: CPT

## 2025-01-03 PROCEDURE — 64566 NEUROELTRD STIM POST TIBIAL: CPT

## 2025-01-03 PROCEDURE — 88184 FLOWCYTOMETRY/ TC 1 MARKER: CPT

## 2025-01-03 PROCEDURE — 87205 SMEAR GRAM STAIN: CPT

## 2025-01-03 PROCEDURE — 85652 RBC SED RATE AUTOMATED: CPT

## 2025-01-03 PROCEDURE — 88185 FLOWCYTOMETRY/TC ADD-ON: CPT

## 2025-01-03 PROCEDURE — 85027 COMPLETE CBC AUTOMATED: CPT

## 2025-01-03 PROCEDURE — 83615 LACTATE (LD) (LDH) ENZYME: CPT

## 2025-01-04 DIAGNOSIS — D72.829 ELEVATED WHITE BLOOD CELL COUNT, UNSPECIFIED: ICD-10-CM

## 2025-01-05 PROBLEM — D72.829 LEUKOCYTOSIS, UNSPECIFIED TYPE: Status: ACTIVE | Noted: 2025-01-05

## 2025-01-06 LAB
HCT VFR BLD CALC: 44.2 %
HGB BLD-MCNC: 14.4 G/DL
MCHC RBC-ENTMCNC: 27.4 PG
MCHC RBC-ENTMCNC: 32.6 G/DL
MCV RBC AUTO: 84 FL
PLATELET # BLD AUTO: 315 K/UL
PMV BLD: 11.3 FL
RBC # BLD: 5.26 M/UL
RBC # FLD: 15.3 %
WBC # FLD AUTO: 11.29 K/UL

## 2025-01-07 LAB
ALBUMIN SERPL ELPH-MCNC: 4.7 G/DL
ALP BLD-CCNC: 171 U/L
ALT SERPL-CCNC: 15 U/L
ANION GAP SERPL CALC-SCNC: 13 MMOL/L
AST SERPL-CCNC: 14 U/L
BILIRUB SERPL-MCNC: 0.2 MG/DL
BUN SERPL-MCNC: 13 MG/DL
CALCIUM SERPL-MCNC: 9.4 MG/DL
CHLORIDE SERPL-SCNC: 104 MMOL/L
CO2 SERPL-SCNC: 25 MMOL/L
CREAT SERPL-MCNC: 0.9 MG/DL
CRP SERPL-MCNC: 11.3 MG/L
EGFR: 82 ML/MIN/1.73M2
ERYTHROCYTE [SEDIMENTATION RATE] IN BLOOD BY WESTERGREN METHOD: 30 MM/HR
GLUCOSE SERPL-MCNC: 91 MG/DL
LDH SERPL-CCNC: 233
POTASSIUM SERPL-SCNC: 4.4 MMOL/L
PROT SERPL-MCNC: 7.5 G/DL
SODIUM SERPL-SCNC: 142 MMOL/L

## 2025-01-10 ENCOUNTER — APPOINTMENT (OUTPATIENT)
Dept: UROLOGY | Facility: CLINIC | Age: 40
End: 2025-01-10
Payer: MEDICAID

## 2025-01-10 LAB — T(9;22)(ABL1,BCR)/CONTROL BLD/T: NORMAL

## 2025-01-10 PROCEDURE — 64566 NEUROELTRD STIM POST TIBIAL: CPT

## 2025-01-17 ENCOUNTER — APPOINTMENT (OUTPATIENT)
Dept: UROLOGY | Facility: CLINIC | Age: 40
End: 2025-01-17

## 2025-01-17 DIAGNOSIS — N39.3 STRESS INCONTINENCE (FEMALE) (MALE): ICD-10-CM

## 2025-01-17 PROCEDURE — 64566 NEUROELTRD STIM POST TIBIAL: CPT

## 2025-01-20 ENCOUNTER — RX RENEWAL (OUTPATIENT)
Age: 40
End: 2025-01-20

## 2025-01-20 DIAGNOSIS — T78.40XA ALLERGY, UNSPECIFIED, INITIAL ENCOUNTER: ICD-10-CM

## 2025-01-20 RX ORDER — MONTELUKAST 10 MG/1
10 TABLET, FILM COATED ORAL
Qty: 1 | Refills: 0 | Status: ACTIVE | COMMUNITY
Start: 2025-01-20 | End: 1900-01-01

## 2025-01-20 RX ORDER — EPINEPHRINE 0.3 MG/.3ML
0.3 INJECTION INTRAMUSCULAR
Qty: 1 | Refills: 4 | Status: ACTIVE | COMMUNITY
Start: 2025-01-20 | End: 1900-01-01

## 2025-01-24 ENCOUNTER — APPOINTMENT (OUTPATIENT)
Dept: UROLOGY | Facility: CLINIC | Age: 40
End: 2025-01-24
Payer: MEDICAID

## 2025-01-24 PROCEDURE — 64566 NEUROELTRD STIM POST TIBIAL: CPT

## 2025-01-31 ENCOUNTER — APPOINTMENT (OUTPATIENT)
Dept: UROLOGY | Facility: CLINIC | Age: 40
End: 2025-01-31
Payer: MEDICAID

## 2025-01-31 DIAGNOSIS — N32.81 OVERACTIVE BLADDER: ICD-10-CM

## 2025-01-31 DIAGNOSIS — N39.3 STRESS INCONTINENCE (FEMALE) (MALE): ICD-10-CM

## 2025-01-31 DIAGNOSIS — R35.0 FREQUENCY OF MICTURITION: ICD-10-CM

## 2025-01-31 PROCEDURE — 64566 NEUROELTRD STIM POST TIBIAL: CPT

## 2025-02-07 ENCOUNTER — APPOINTMENT (OUTPATIENT)
Dept: UROLOGY | Facility: CLINIC | Age: 40
End: 2025-02-07

## 2025-02-14 ENCOUNTER — APPOINTMENT (OUTPATIENT)
Dept: UROLOGY | Facility: CLINIC | Age: 40
End: 2025-02-14

## 2025-02-14 PROCEDURE — 64566 NEUROELTRD STIM POST TIBIAL: CPT

## 2025-02-21 ENCOUNTER — APPOINTMENT (OUTPATIENT)
Dept: UROLOGY | Facility: CLINIC | Age: 40
End: 2025-02-21
Payer: MEDICAID

## 2025-02-21 ENCOUNTER — RX RENEWAL (OUTPATIENT)
Age: 40
End: 2025-02-21

## 2025-02-21 PROCEDURE — 64566 NEUROELTRD STIM POST TIBIAL: CPT

## 2025-02-28 ENCOUNTER — APPOINTMENT (OUTPATIENT)
Dept: UROLOGY | Facility: CLINIC | Age: 40
End: 2025-02-28
Payer: MEDICAID

## 2025-02-28 DIAGNOSIS — N39.3 STRESS INCONTINENCE (FEMALE) (MALE): ICD-10-CM

## 2025-02-28 DIAGNOSIS — R35.0 FREQUENCY OF MICTURITION: ICD-10-CM

## 2025-02-28 DIAGNOSIS — N32.81 OVERACTIVE BLADDER: ICD-10-CM

## 2025-02-28 PROCEDURE — 64566 NEUROELTRD STIM POST TIBIAL: CPT

## 2025-03-18 ENCOUNTER — APPOINTMENT (OUTPATIENT)
Dept: ORTHOPEDIC SURGERY | Facility: CLINIC | Age: 40
End: 2025-03-18

## 2025-03-24 ENCOUNTER — APPOINTMENT (OUTPATIENT)
Dept: URBAN - METROPOLITAN AREA CLINIC 12 | Facility: CLINIC | Age: 40
Setting detail: DERMATOLOGY
End: 2025-03-24

## 2025-03-24 DIAGNOSIS — Z41.1 ENCOUNTER FOR COSMETIC SURGERY: ICD-10-CM

## 2025-03-24 PROCEDURE — ? PATIENT SPECIFIC COUNSELING

## 2025-03-24 PROCEDURE — ? CONSULTATION - AGING FACE

## 2025-03-24 ASSESSMENT — LOCATION DETAILED DESCRIPTION DERM
LOCATION DETAILED: LEFT SUPERIOR CENTRAL MALAR CHEEK
LOCATION DETAILED: RIGHT MEDIAL INFERIOR EYELID
LOCATION DETAILED: RIGHT SUPERIOR CENTRAL MALAR CHEEK
LOCATION DETAILED: LEFT SUPERIOR CENTRAL MALAR CHEEK
LOCATION DETAILED: RIGHT SUPERIOR MEDIAL MALAR CHEEK

## 2025-03-24 ASSESSMENT — LOCATION SIMPLE DESCRIPTION DERM
LOCATION SIMPLE: LEFT CHEEK
LOCATION SIMPLE: RIGHT CHEEK
LOCATION SIMPLE: RIGHT CHEEK
LOCATION SIMPLE: RIGHT INFERIOR EYELID
LOCATION SIMPLE: LEFT CHEEK

## 2025-03-24 ASSESSMENT — LOCATION ZONE DERM
LOCATION ZONE: FACE
LOCATION ZONE: FACE
LOCATION ZONE: EYELID

## 2025-03-24 NOTE — HPI: FACE (AGING FACE)
How Severe Is It?: moderate
Is This A New Presentation, Or A Follow-Up?: Aging Face
Additional History: Referred by Song lopez

## 2025-03-24 NOTE — PROCEDURE: PATIENT SPECIFIC COUNSELING
Detail Level: Simple
Other (Free Text): Pt presents to consult with Dr. Mullins about her under eye bags, pt states that that area has not been treated prior. Pt voices that she does not struggle with seasonal allergies, does not wear corrective lens. \\n\\Daisy Mullins examines the pt and states:\\n-he can do an in office procedure under local anesthesia to remove the fatty pads \\n-he recommends doing a chemical peel at the same time \\n-other option that the pt’s has is to get filler to mask that area \\n-the process, recovery and aftercare of the procedure was explained to the pt \\n\\nPt voices that she is interested in getting the procedure. Becca stepped in.

## 2025-03-31 ENCOUNTER — APPOINTMENT (OUTPATIENT)
Dept: UROLOGY | Facility: CLINIC | Age: 40
End: 2025-03-31
Payer: MEDICAID

## 2025-03-31 VITALS
BODY MASS INDEX: 32.78 KG/M2 | SYSTOLIC BLOOD PRESSURE: 126 MMHG | OXYGEN SATURATION: 96 % | WEIGHT: 204 LBS | DIASTOLIC BLOOD PRESSURE: 90 MMHG | HEART RATE: 91 BPM | TEMPERATURE: 98 F | HEIGHT: 66 IN | RESPIRATION RATE: 17 BRPM

## 2025-03-31 DIAGNOSIS — N20.0 CALCULUS OF KIDNEY: ICD-10-CM

## 2025-03-31 DIAGNOSIS — M79.2 NEURALGIA AND NEURITIS, UNSPECIFIED: ICD-10-CM

## 2025-03-31 DIAGNOSIS — N32.81 OVERACTIVE BLADDER: ICD-10-CM

## 2025-03-31 DIAGNOSIS — N39.3 STRESS INCONTINENCE (FEMALE) (MALE): ICD-10-CM

## 2025-03-31 DIAGNOSIS — R35.0 FREQUENCY OF MICTURITION: ICD-10-CM

## 2025-03-31 LAB
BILIRUB UR QL STRIP: NORMAL
COLLECTION METHOD: NORMAL
GLUCOSE UR-MCNC: NORMAL
HCG UR QL: 0.2 EU/DL
HGB UR QL STRIP.AUTO: NORMAL
KETONES UR-MCNC: NORMAL
LEUKOCYTE ESTERASE UR QL STRIP: NORMAL
NITRITE UR QL STRIP: NORMAL
PH UR STRIP: 7
PROT UR STRIP-MCNC: NORMAL
SP GR UR STRIP: 1.02

## 2025-03-31 PROCEDURE — 99213 OFFICE O/P EST LOW 20 MIN: CPT

## 2025-03-31 PROCEDURE — G2211 COMPLEX E/M VISIT ADD ON: CPT | Mod: NC

## 2025-04-01 ENCOUNTER — NON-APPOINTMENT (OUTPATIENT)
Age: 40
End: 2025-04-01

## 2025-04-10 ENCOUNTER — APPOINTMENT (OUTPATIENT)
Dept: ORTHOPEDIC SURGERY | Facility: CLINIC | Age: 40
End: 2025-04-10
Payer: OTHER MISCELLANEOUS

## 2025-04-10 ENCOUNTER — NON-APPOINTMENT (OUTPATIENT)
Age: 40
End: 2025-04-10

## 2025-04-10 DIAGNOSIS — M25.511 PAIN IN RIGHT SHOULDER: ICD-10-CM

## 2025-04-10 PROCEDURE — 99213 OFFICE O/P EST LOW 20 MIN: CPT

## 2025-05-21 ENCOUNTER — APPOINTMENT (OUTPATIENT)
Dept: URBAN - METROPOLITAN AREA CLINIC 12 | Facility: CLINIC | Age: 40
Setting detail: DERMATOLOGY
End: 2025-05-21

## 2025-05-21 DIAGNOSIS — Z41.1 ENCOUNTER FOR COSMETIC SURGERY: ICD-10-CM

## 2025-05-21 PROCEDURE — ? PRE-OP WORKLIST

## 2025-05-21 PROCEDURE — ? OTHER (COSMETIC)

## 2025-05-21 ASSESSMENT — LOCATION ZONE DERM
LOCATION ZONE: FACE
LOCATION ZONE: FACE

## 2025-05-21 ASSESSMENT — LOCATION SIMPLE DESCRIPTION DERM
LOCATION SIMPLE: RIGHT CHEEK
LOCATION SIMPLE: LEFT CHEEK
LOCATION SIMPLE: LEFT CHEEK
LOCATION SIMPLE: RIGHT CHEEK

## 2025-05-21 ASSESSMENT — LOCATION DETAILED DESCRIPTION DERM
LOCATION DETAILED: LEFT SUPERIOR CENTRAL MALAR CHEEK
LOCATION DETAILED: LEFT SUPERIOR CENTRAL MALAR CHEEK
LOCATION DETAILED: RIGHT SUPERIOR MEDIAL MALAR CHEEK
LOCATION DETAILED: RIGHT SUPERIOR MEDIAL MALAR CHEEK

## 2025-05-28 ENCOUNTER — APPOINTMENT (OUTPATIENT)
Dept: URBAN - METROPOLITAN AREA CLINIC 12 | Facility: CLINIC | Age: 40
Setting detail: DERMATOLOGY
End: 2025-05-28

## 2025-05-28 DIAGNOSIS — Z41.1 ENCOUNTER FOR COSMETIC SURGERY: ICD-10-CM

## 2025-05-28 PROCEDURE — ? COSMETIC BLEPHAROPLASTY

## 2025-05-28 PROCEDURE — ? CHEMICAL PEEL JESSNER/TCA

## 2025-05-28 ASSESSMENT — LOCATION ZONE DERM
LOCATION ZONE: FACE
LOCATION ZONE: FACE

## 2025-05-28 ASSESSMENT — LOCATION DETAILED DESCRIPTION DERM
LOCATION DETAILED: RIGHT SUPERIOR CENTRAL MALAR CHEEK
LOCATION DETAILED: LEFT SUPERIOR CENTRAL MALAR CHEEK
LOCATION DETAILED: RIGHT SUPERIOR MEDIAL MALAR CHEEK
LOCATION DETAILED: LEFT SUPERIOR CENTRAL MALAR CHEEK

## 2025-05-28 ASSESSMENT — LOCATION SIMPLE DESCRIPTION DERM
LOCATION SIMPLE: LEFT CHEEK
LOCATION SIMPLE: RIGHT CHEEK
LOCATION SIMPLE: RIGHT CHEEK
LOCATION SIMPLE: LEFT CHEEK

## 2025-05-28 NOTE — PROCEDURE: COSMETIC BLEPHAROPLASTY
Local Anesthesia Volume In Cc: 4
Skin Closure: simple interrupted
Hemostasis: electrocautery
Assistant: Lidia Pantoja
Preparation: A time-out was taken prior to the procedure to identify the patient and surgical sites.
Detail Level: Generalized
Conjunctival Closure Sutures: 6-0 fast absorbing plain gut
Estimated Blood Loss (Cc): minimal
Consent: The risks, benefits, expectations and alternatives of blepharoplasty were discussed in detail with the patient, including, but not limited to, the risks of postoperative infection, bleeding, delayed healing, injury to the globe,  scarring, pain, asymmetry, loss of vision, lagophthalmos, ectropion, entropion, corneal abrasion, possible need for additional procedures, and dissatisfaction with cosmetic outcome. The patient verbalized understanding and nformed consent was obtained.
Skin Marking: Incisions were marked out with a marking pen approximately 1 mm inferior to the lash line from the level of the inferior punctum and extending laterally from the lateral canthus in a slightly downward direction for approximately 1 cm.
Skin Marking: The area of eyelid skin to be excised from the upper eyelid was marked out with a marking pen.
Intro: The patient was prepared and draped in the usual sterile fashion.
Surgeon: Dr. Isaiah Mullins

## 2025-05-28 NOTE — PROCEDURE: CHEMICAL PEEL JESSNER/TCA
Treatment Number: 1
Consent: Prior to the procedure, written consent was obtained and risks, benefits, expectations and alternatives were reviewed, including, but not limited to,  redness, peeling, blistering, pigmentary change, scarring, infection, and pain.
Chemical Peel: 20% TCA
Detail Level: Zone
Prep: The treated area was degreased with pre-peel cleanser, and vaseline was applied for protection of mucous membranes.
Frost (0,1+,2+,3+,4+): 0

## 2025-05-29 ENCOUNTER — APPOINTMENT (OUTPATIENT)
Dept: URBAN - METROPOLITAN AREA CLINIC 12 | Facility: CLINIC | Age: 40
Setting detail: DERMATOLOGY
End: 2025-05-29

## 2025-05-29 DIAGNOSIS — Z42.8 ENCOUNTER FOR OTHER PLASTIC AND RECONSTRUCTIVE SURGERY FOLLOWING MEDICAL PROCEDURE OR HEALED INJURY: ICD-10-CM

## 2025-05-29 PROCEDURE — ? CONSULTATION - S/P CHEMICAL PEEL

## 2025-05-29 ASSESSMENT — LOCATION DETAILED DESCRIPTION DERM
LOCATION DETAILED: RIGHT SUPERIOR CENTRAL MALAR CHEEK
LOCATION DETAILED: LEFT CENTRAL MALAR CHEEK
LOCATION DETAILED: RIGHT SUPERIOR CENTRAL MALAR CHEEK
LOCATION DETAILED: LEFT SUPERIOR CENTRAL MALAR CHEEK

## 2025-05-29 ASSESSMENT — LOCATION SIMPLE DESCRIPTION DERM
LOCATION SIMPLE: LEFT CHEEK
LOCATION SIMPLE: LEFT CHEEK
LOCATION SIMPLE: RIGHT CHEEK
LOCATION SIMPLE: RIGHT CHEEK

## 2025-05-29 ASSESSMENT — LOCATION ZONE DERM
LOCATION ZONE: FACE
LOCATION ZONE: FACE

## 2025-06-04 ENCOUNTER — APPOINTMENT (OUTPATIENT)
Dept: URBAN - METROPOLITAN AREA CLINIC 12 | Facility: CLINIC | Age: 40
Setting detail: DERMATOLOGY
End: 2025-06-04

## 2025-06-04 DIAGNOSIS — Z48.89 ENCOUNTER FOR OTHER SPECIFIED SURGICAL AFTERCARE: ICD-10-CM

## 2025-06-04 PROCEDURE — ? PATIENT SPECIFIC COUNSELING

## 2025-06-04 PROCEDURE — ? CONSULTATION - S/P CHEMICAL PEEL

## 2025-06-04 PROCEDURE — ? POST-OP CHECK

## 2025-06-04 PROCEDURE — ? COUNSELING - POST-OP CHECK, BLEPHAROPLASTY

## 2025-06-04 ASSESSMENT — LOCATION DETAILED DESCRIPTION DERM
LOCATION DETAILED: RIGHT CENTRAL MALAR CHEEK
LOCATION DETAILED: LEFT CENTRAL MALAR CHEEK
LOCATION DETAILED: LEFT SUPERIOR CENTRAL MALAR CHEEK
LOCATION DETAILED: RIGHT SUPERIOR CENTRAL MALAR CHEEK
LOCATION DETAILED: RIGHT SUPERIOR MEDIAL MALAR CHEEK

## 2025-06-04 ASSESSMENT — LOCATION SIMPLE DESCRIPTION DERM
LOCATION SIMPLE: LEFT CHEEK
LOCATION SIMPLE: RIGHT CHEEK

## 2025-06-04 ASSESSMENT — LOCATION ZONE DERM: LOCATION ZONE: FACE

## 2025-06-04 NOTE — PROCEDURE: PATIENT SPECIFIC COUNSELING
Other (Free Text): Patient presents s/p lower lid blepharoplasty with chemical peel performed 5/28/25. Patient is doing well, no concerns with healing.\\n\\nDrJuan Carlos Mullins evaluated patient and explained \\n\\n- lower lids look great\\n- chemical peel is doing well. \\n- sutures removed today\\n- patient may discontinue vinegar soaks and ophthalmic ointment \\n- informed patient that she will continue peels for the next day or two \\n- redness is normal and will fade with time \\n- educated on the importance of sun protection, staying out of direct sun light and wearing a hat. \\n- patient may wear makeup starting next Monday \\n- follow up in 3 weeks
Detail Level: Simple

## 2025-06-14 ENCOUNTER — NON-APPOINTMENT (OUTPATIENT)
Age: 40
End: 2025-06-14

## 2025-06-16 ENCOUNTER — APPOINTMENT (OUTPATIENT)
Dept: UROLOGY | Facility: CLINIC | Age: 40
End: 2025-06-16
Payer: MEDICAID

## 2025-06-16 VITALS
HEIGHT: 66 IN | WEIGHT: 194 LBS | HEART RATE: 89 BPM | SYSTOLIC BLOOD PRESSURE: 128 MMHG | DIASTOLIC BLOOD PRESSURE: 85 MMHG | OXYGEN SATURATION: 99 % | BODY MASS INDEX: 31.18 KG/M2

## 2025-06-16 PROCEDURE — G2211 COMPLEX E/M VISIT ADD ON: CPT | Mod: NC

## 2025-06-16 PROCEDURE — 99214 OFFICE O/P EST MOD 30 MIN: CPT

## 2025-06-16 RX ORDER — MIRABEGRON 25 MG/1
25 TABLET, EXTENDED RELEASE ORAL
Qty: 90 | Refills: 3 | Status: ACTIVE | COMMUNITY
Start: 2025-06-16 | End: 1900-01-01

## 2025-06-19 ENCOUNTER — RX RENEWAL (OUTPATIENT)
Age: 40
End: 2025-06-19

## 2025-07-01 ENCOUNTER — RX ONLY (RX ONLY)
Age: 40
End: 2025-07-01

## 2025-07-01 ENCOUNTER — APPOINTMENT (OUTPATIENT)
Dept: URBAN - METROPOLITAN AREA CLINIC 12 | Facility: CLINIC | Age: 40
Setting detail: DERMATOLOGY
End: 2025-07-01

## 2025-07-01 DIAGNOSIS — Z48.89 ENCOUNTER FOR OTHER SPECIFIED SURGICAL AFTERCARE: ICD-10-CM

## 2025-07-01 PROCEDURE — ? COUNSELING - POST-OP CHECK, BLEPHAROPLASTY

## 2025-07-01 PROCEDURE — ? POST-OP CHECK

## 2025-07-01 PROCEDURE — ? CONSULTATION - S/P CHEMICAL PEEL

## 2025-07-01 PROCEDURE — ? PATIENT SPECIFIC COUNSELING

## 2025-07-01 RX ORDER — ALCLOMETASONE DIPROPIONATE 0.5 MG/G
THIN FILM CREAM TOPICAL ONCE A DAY
Qty: 60 | Refills: 0 | Status: ERX | COMMUNITY
Start: 2025-07-01

## 2025-07-01 ASSESSMENT — LOCATION ZONE DERM: LOCATION ZONE: FACE

## 2025-07-01 ASSESSMENT — LOCATION DETAILED DESCRIPTION DERM
LOCATION DETAILED: LEFT CENTRAL MALAR CHEEK
LOCATION DETAILED: RIGHT CENTRAL MALAR CHEEK
LOCATION DETAILED: RIGHT SUPERIOR MEDIAL MALAR CHEEK
LOCATION DETAILED: LEFT SUPERIOR CENTRAL MALAR CHEEK
LOCATION DETAILED: RIGHT SUPERIOR CENTRAL MALAR CHEEK

## 2025-07-01 ASSESSMENT — LOCATION SIMPLE DESCRIPTION DERM
LOCATION SIMPLE: RIGHT CHEEK
LOCATION SIMPLE: LEFT CHEEK

## 2025-07-01 NOTE — PROCEDURE: PATIENT SPECIFIC COUNSELING
Other (Free Text): Patient presents s/p lower lid blepharoplasty with chemical peel performed 5/28/25. Patient is doing well, no concerns with healing. Patient is concerned about redness\\n\\Daisy Mullins evaluated patient and discussed \\n- sun avoidance \\n- steroid cream, spf, and big sunglasses \\n- lower eyelid incisions look great \\n- advised not going any longer than 2 weeks on the steroid cream
Detail Level: Simple

## 2025-07-08 ENCOUNTER — RX RENEWAL (OUTPATIENT)
Age: 40
End: 2025-07-08

## 2025-07-08 ENCOUNTER — RX ONLY (RX ONLY)
Age: 40
End: 2025-07-08

## 2025-07-08 RX ORDER — FLUTICASONE PROPIONATE 0.5 MG/G
THIN FILM CREAM TOPICAL TWICE A DAY
Qty: 60 | Refills: 0 | Status: ERX | COMMUNITY
Start: 2025-07-08

## 2025-07-23 ENCOUNTER — RX ONLY (RX ONLY)
Age: 40
End: 2025-07-23

## 2025-07-23 RX ORDER — HYDROQUINONE 4 %
THIN FILM CREAM (GRAM) TOPICAL ONCE A DAY
Qty: 1 | Refills: 1 | Status: ERX | COMMUNITY
Start: 2025-07-23

## 2025-07-25 NOTE — PHYSICAL THERAPY INITIAL EVALUATION ADULT - HEALTH SCREEN CRITERIA
Pt arrives to ED from home with cc of abdominal pain and dizziness. Pt expresses concern for possible cellulitis of her abdomen. Pt reports she has had hx of abdominal cellulitis and states \"it feels exactly the same as the previous times.\" Pt also endorses some dizziness. Pt denies cp, sob, fevers, and nausea.    yes

## 2025-08-05 ENCOUNTER — NON-APPOINTMENT (OUTPATIENT)
Age: 40
End: 2025-08-05

## 2025-08-05 ENCOUNTER — APPOINTMENT (OUTPATIENT)
Dept: ORTHOPEDIC SURGERY | Facility: CLINIC | Age: 40
End: 2025-08-05
Payer: OTHER MISCELLANEOUS

## 2025-08-05 DIAGNOSIS — M25.511 PAIN IN RIGHT SHOULDER: ICD-10-CM

## 2025-08-05 PROCEDURE — 99213 OFFICE O/P EST LOW 20 MIN: CPT

## 2025-08-11 ENCOUNTER — APPOINTMENT (OUTPATIENT)
Dept: UROLOGY | Facility: CLINIC | Age: 40
End: 2025-08-11
Payer: MEDICAID

## 2025-08-11 VITALS
HEART RATE: 93 BPM | HEIGHT: 66 IN | OXYGEN SATURATION: 98 % | DIASTOLIC BLOOD PRESSURE: 92 MMHG | SYSTOLIC BLOOD PRESSURE: 143 MMHG

## 2025-08-11 DIAGNOSIS — R35.0 FREQUENCY OF MICTURITION: ICD-10-CM

## 2025-08-11 DIAGNOSIS — N39.3 STRESS INCONTINENCE (FEMALE) (MALE): ICD-10-CM

## 2025-08-11 DIAGNOSIS — N32.81 OVERACTIVE BLADDER: ICD-10-CM

## 2025-08-11 DIAGNOSIS — N20.0 CALCULUS OF KIDNEY: ICD-10-CM

## 2025-08-11 PROCEDURE — 99214 OFFICE O/P EST MOD 30 MIN: CPT

## 2025-08-11 PROCEDURE — G2211 COMPLEX E/M VISIT ADD ON: CPT | Mod: NC

## 2025-08-11 RX ORDER — MIRABEGRON 50 MG/1
50 TABLET, EXTENDED RELEASE ORAL
Qty: 90 | Refills: 3 | Status: ACTIVE | COMMUNITY
Start: 2025-08-11 | End: 1900-01-01

## 2025-08-18 ENCOUNTER — RX RENEWAL (OUTPATIENT)
Age: 40
End: 2025-08-18

## 2025-08-26 ENCOUNTER — APPOINTMENT (OUTPATIENT)
Dept: URBAN - METROPOLITAN AREA CLINIC 12 | Facility: CLINIC | Age: 40
Setting detail: DERMATOLOGY
End: 2025-08-26

## 2025-08-26 DIAGNOSIS — Z48.89 ENCOUNTER FOR OTHER SPECIFIED SURGICAL AFTERCARE: ICD-10-CM

## 2025-08-26 PROCEDURE — ? POST-OP CHECK

## 2025-08-26 PROCEDURE — ? PATIENT SPECIFIC COUNSELING

## 2025-08-26 PROCEDURE — ? CONSULTATION - S/P CHEMICAL PEEL

## 2025-08-26 PROCEDURE — ? COUNSELING - POST-OP CHECK, BLEPHAROPLASTY

## 2025-08-26 ASSESSMENT — LOCATION DETAILED DESCRIPTION DERM
LOCATION DETAILED: RIGHT SUPERIOR MEDIAL MALAR CHEEK
LOCATION DETAILED: LEFT CENTRAL MALAR CHEEK
LOCATION DETAILED: RIGHT SUPERIOR CENTRAL MALAR CHEEK
LOCATION DETAILED: RIGHT CENTRAL MALAR CHEEK
LOCATION DETAILED: LEFT SUPERIOR CENTRAL MALAR CHEEK

## 2025-08-26 ASSESSMENT — LOCATION SIMPLE DESCRIPTION DERM
LOCATION SIMPLE: RIGHT CHEEK
LOCATION SIMPLE: LEFT CHEEK

## 2025-08-26 ASSESSMENT — LOCATION ZONE DERM: LOCATION ZONE: FACE
